# Patient Record
Sex: FEMALE | Race: WHITE | NOT HISPANIC OR LATINO | Employment: OTHER | URBAN - METROPOLITAN AREA
[De-identification: names, ages, dates, MRNs, and addresses within clinical notes are randomized per-mention and may not be internally consistent; named-entity substitution may affect disease eponyms.]

---

## 2017-08-02 ENCOUNTER — OFFICE VISIT (OUTPATIENT)
Dept: INTERNAL MEDICINE | Facility: MEDICAL CENTER | Age: 42
End: 2017-08-02
Payer: MEDICAID

## 2017-08-02 VITALS
BODY MASS INDEX: 26.23 KG/M2 | HEART RATE: 99 BPM | RESPIRATION RATE: 18 BRPM | WEIGHT: 157.4 LBS | SYSTOLIC BLOOD PRESSURE: 124 MMHG | DIASTOLIC BLOOD PRESSURE: 64 MMHG | TEMPERATURE: 97 F | OXYGEN SATURATION: 96 % | HEIGHT: 65 IN

## 2017-08-02 DIAGNOSIS — G89.29 CHRONIC GENERALIZED ABDOMINAL PAIN: ICD-10-CM

## 2017-08-02 DIAGNOSIS — N64.52 BREAST DISCHARGE: ICD-10-CM

## 2017-08-02 DIAGNOSIS — R10.84 CHRONIC GENERALIZED ABDOMINAL PAIN: ICD-10-CM

## 2017-08-02 DIAGNOSIS — F43.10 PTSD (POST-TRAUMATIC STRESS DISORDER): ICD-10-CM

## 2017-08-02 PROCEDURE — 99204 OFFICE O/P NEW MOD 45 MIN: CPT | Mod: GC | Performed by: INTERNAL MEDICINE

## 2017-08-02 RX ORDER — NORGESTIMATE AND ETHINYL ESTRADIOL 7DAYSX3 28
1 KIT ORAL DAILY
COMMUNITY
End: 2017-11-13

## 2017-08-02 RX ORDER — CLONAZEPAM 0.5 MG/1
0.5 TABLET ORAL 3 TIMES DAILY PRN
COMMUNITY
End: 2022-06-03

## 2017-08-02 RX ORDER — MIRTAZAPINE 15 MG/1
3.75-7.5 TABLET, FILM COATED ORAL NIGHTLY
COMMUNITY
End: 2021-07-14

## 2017-08-02 RX ORDER — NITROFURANTOIN MACROCRYSTALS 50 MG/1
50 CAPSULE ORAL 4 TIMES DAILY
COMMUNITY
End: 2017-11-13

## 2017-08-02 RX ORDER — SERTRALINE HYDROCHLORIDE 20 MG/ML
3.5 SOLUTION ORAL DAILY
COMMUNITY
End: 2019-02-11

## 2017-08-02 RX ORDER — OXYCODONE HCL 20 MG/ML
10 CONCENTRATE, ORAL ORAL EVERY 4 HOURS PRN
COMMUNITY
End: 2020-02-03

## 2017-08-02 ASSESSMENT — PAIN SCALES - GENERAL: PAINLEVEL: 8=MODERATE-SEVERE PAIN

## 2017-08-02 NOTE — MR AVS SNAPSHOT
"        ELISSA Berg   2017 2:30 PM   Office Visit   MRN: [id number]    Department:  [facility] - Internal Med   Dept Phone:  [phone]    Description:  Female : 1975   Provider:  Meseret Ballard M.D.           Reason for Visit     Chronic Opiate Therapy referral pain management    Depression     Pelvic Pain           Allergies as of 2017     Allergen Noted Reactions    Sammy-1 [Lidocaine Hcl] 2014   Anaphylaxis    Topical or injectable, \"subq numbing anesthetics\"  \"Causes Methemoglobinemia.\"    Pcn [Penicillins] 2014   Rash      You were diagnosed with     Chronic generalized abdominal pain   [123961]       SBO (small bowel obstruction) (CMS-HCC)   [626351]       Breast discharge   [042731]       PTSD (post-traumatic stress disorder)   [661198]         Vital Signs     Blood Pressure Pulse Temperature Respirations Height Weight    124/64 mmHg 99 36.1 °C (97 °F) 18 1.638 m (5' 4.5\") 71.396 kg (157 lb 6.4 oz)    Body Mass Index Oxygen Saturation Breastfeeding? Smoking Status          26.61 kg/m2 96% No Never Smoker         Basic Information     Date Of Birth Sex Race Ethnicity Preferred Language    1975 Female White Non- English      Your appointments     Oct 10, 2017 10:45 AM   Established Patient with Meseret Ballard M.D.   [facility] / [facility] Med - Internal Medicine (--)    [address] [place] [address] [id number] [phone]           You will be receiving a confirmation call a few days before your appointment from our automated call confirmation system.              Problem List              ICD-10-CM Priority Class Noted - Resolved    Unspecified complication of colostomy or enterostomy (CMS-HCC) PFQ9956   2013 - Present    SBO (small bowel obstruction) (CMS-HCC) K56.69 High  3/18/2013 - Present    Abdominal pain, bilateral lower quadrant  High  3/18/2013 - Present    Neurogenic bladder-straight cath  N31.9 Low  3/26/2014 - Present  "    PTSD (post-traumatic stress disorder) F43.10 Low  3/26/2014 - Present    Chronic narcotic dependence (CMS-HCC) F11.20 Medium  3/26/2014 - Present    Leukocytosis D72.829   3/26/2014 - Present    Hypokalemia E87.6 Low  3/26/2014 - Present    Hypophosphatemia E83.39 Low  3/26/2014 - Present    Elevated liver enzymes R74.8   3/26/2014 - Present      Health Maintenance        Date Due Completion Dates    IMM DTaP/Tdap/Td Vaccine (1 - Tdap) 8/18/1993 ---    PAP SMEAR 8/18/1995 ---    MAMMOGRAM 8/18/2014 ---    IMM INFLUENZA (1) 9/1/2017 ---            Current Immunizations     No immunizations on file.      Below and/or attached are the medications your provider expects you to take. Review all of your home medications and newly ordered medications with your provider and/or pharmacist. Follow medication instructions as directed by your provider and/or pharmacist. Please keep your medication list with you and share with your provider. Update the information when medications are discontinued, doses are changed, or new medications (including over-the-counter products) are added; and carry medication information at all times in the event of emergency situations     Allergies:  CATHY-1 - Anaphylaxis     PCN - Rash               Medications  Valid as of: August 02, 2017 -  3:21 PM    Generic Name Brand Name Tablet Size Instructions for use    ClonazePAM (Tab) KLONOPIN 0.5 MG Take 0.25 mg by mouth 2 times a day.        Cyanocobalamin (Solution) VITAMIN B-12 1000 MCG/ML 1,000 mcg by Intramuscular route every 14 days.        Mirtazapine (Tab) REMERON 15 MG Take 15 mg by mouth every evening.        Nitrofurantoin Macrocrystal (Cap) MACRODANTIN 50 MG Take 50 mg by mouth 4 times a day.        Norgestim-Eth Estrad Triphasic (Tab) Norgestim-Eth Estrad Triphasic 0.18/0.215/0.25 MG-35 MCG Take 1 Tab by mouth every day.        OxyCODONE HCl (Conc) oxycodone 20 MG/ML Take 10 mg by mouth every four hours as needed.        Sertraline HCl  (Conc) ZOLOFT 20 MG/ML Take 25 mg by mouth every day.        TraMADol HCl (Tab) ULTRAM 50 MG Take  mg by mouth every four hours as needed.        .                 Medicines prescribed today were sent to:     None      Medication refill instructions:       If your prescription bottle indicates you have medication refills left, it is not necessary to call your provider’s office. Please contact your pharmacy and they will refill your medication.    If your prescription bottle indicates you do not have any refills left, you may request refills at any time through one of the following ways: The online [employer] system (except Urgent Care), by calling your provider’s office, or by asking your pharmacy to contact your provider’s office with a refill request. Medication refills are processed only during regular business hours and may not be available until the next business day. Your provider may request additional information or to have a follow-up visit with you prior to refilling your medication.   *Please Note: Medication refills are assigned a new Rx number when refilled electronically. Your pharmacy may indicate that no refills were authorized even though a new prescription for the same medication is available at the pharmacy. Please request the medicine by name with the pharmacy before contacting your provider for a refill.        Your To Do List     Future Labs/Procedures Complete By Expires    CBC WITH DIFFERENTIAL  As directed 8/2/2018    COMP METABOLIC PANEL  As directed 8/2/2018      Referral     A referral request has been sent to our patient care coordination department. Please allow 3-5 business days for us to process this request and contact you either by phone or mail. If you do not hear from us by the 5th business day, please call us at [phone].        Instructions    Please make the patient sign the medical release form, thank you!          [employer] Access Code: Activation code not  generated  Current MyChart Status: Active
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
## 2017-08-10 ENCOUNTER — HOSPITAL ENCOUNTER (OUTPATIENT)
Dept: RADIOLOGY | Facility: MEDICAL CENTER | Age: 42
End: 2017-08-10

## 2017-11-13 ENCOUNTER — OFFICE VISIT (OUTPATIENT)
Dept: INTERNAL MEDICINE | Facility: MEDICAL CENTER | Age: 42
End: 2017-11-13
Payer: MEDICAID

## 2017-11-13 VITALS
OXYGEN SATURATION: 95 % | HEART RATE: 75 BPM | TEMPERATURE: 97.8 F | DIASTOLIC BLOOD PRESSURE: 71 MMHG | WEIGHT: 154.8 LBS | HEIGHT: 65 IN | SYSTOLIC BLOOD PRESSURE: 122 MMHG | BODY MASS INDEX: 25.79 KG/M2

## 2017-11-13 DIAGNOSIS — N31.9 NEUROGENIC BLADDER: ICD-10-CM

## 2017-11-13 DIAGNOSIS — F43.10 PTSD (POST-TRAUMATIC STRESS DISORDER): ICD-10-CM

## 2017-11-13 PROCEDURE — 99214 OFFICE O/P EST MOD 30 MIN: CPT | Mod: GC | Performed by: INTERNAL MEDICINE

## 2017-11-13 RX ORDER — NITROFURANTOIN MACROCRYSTALS 50 MG/1
50 CAPSULE ORAL PRN
Qty: 30 CAP | Refills: 3 | Status: SHIPPED | OUTPATIENT
Start: 2017-11-13 | End: 2019-01-27 | Stop reason: SDUPTHER

## 2017-11-13 ASSESSMENT — PAIN SCALES - GENERAL: PAINLEVEL: 6=MODERATE PAIN

## 2017-11-13 NOTE — PROGRESS NOTES
Established Patient    ELISSA presents today with the following:    CC: Follow up, abdominal pain    HPI: ELISSA Berg is a 42 y.o. Female with past medical history of multiple abdominal surgeries for endometriosis complicated with peritonitis, patient had multiple bowel segments removed, ileal removal, splenectomy, surgical drains, divert colostomy followed by revision and small bowel connected to the rectum,  patient has a chronic abdominal pain from multiple surgeries, following pain management clinic for pain medications, symptoms controlled. she was offered sympathetic block vs. intraabdominal pain med pump.      The patient was stopped taking contraceptive medications by her gynecologist due to increased risk of blood clot, also the patient is not sexually active, educated to use a condom if needed.      The patient was told that she had right adrenal mass found in the US and will have a CT scan with/without contrast this Thursday, denies any weight loss, vomiting, nausea.      Review of Systems:     Constitutional: Denies fevers, weight changes  Eyes: Denies changes in vision, no eye pain  Ears/Nose/Throat/Mouth: Denies nasal congestion or sore throat   Cardiovascular: Denies chest pain or palpitations   Respiratory: Denies shortness of breath , Denies cough  Gastrointestinal/Hepatic: per H and P  Genitourinary: self cath on nitrofurantoin as needed  Musculoskeletal/Rheum: Denies  joint pain and swelling   Skin: Denies rash.  Neurological: Denies headache, confusion, memory loss or focal weakness/parasthesias  Psychiatric: denies mood disorder         Patient Active Problem List    Diagnosis Date Noted   • SBO (small bowel obstruction) 03/18/2013     Priority: High   • Abdominal pain, bilateral lower quadrant 03/18/2013     Priority: High   • Chronic narcotic dependence (CMS-HCC) 03/26/2014     Priority: Medium   • Neurogenic bladder-straight cath  03/26/2014     Priority: Low   • PTSD  "(post-traumatic stress disorder) 03/26/2014     Priority: Low   • Hypokalemia 03/26/2014     Priority: Low   • Hypophosphatemia 03/26/2014     Priority: Low   • Leukocytosis 03/26/2014   • Elevated liver enzymes 03/26/2014   • Unspecified complication of colostomy or enterostomy 01/04/2013       Current Outpatient Prescriptions   Medication Sig Dispense Refill   • nitrofurantoin (MACRODANTIN) 50 MG Cap Take 1 Cap by mouth as needed. For UTI related to self cath 30 Cap 3   • clonazepam (KLONOPIN) 0.5 MG Tab Take 0.25 mg by mouth 2 times a day.     • oxycodone 20 MG/ML Conc Take 10 mg by mouth every four hours as needed.     • sertraline (ZOLOFT) 20 MG/ML concentrated solution Take 25 mg by mouth every day.     • mirtazapine (REMERON) 15 MG Tab Take 15 mg by mouth every evening.     • cyanocobalamin (VITAMIN B-12) 1000 MCG/ML SOLN 1,000 mcg by Intramuscular route every 14 days.     • tramadol (ULTRAM) 50 MG TABS Take  mg by mouth every four hours as needed.       No current facility-administered medications for this visit.        /71   Pulse 75   Temp 36.6 °C (97.8 °F)   Ht 1.638 m (5' 4.5\")   Wt 70.2 kg (154 lb 12.8 oz)   SpO2 95%   BMI 26.16 kg/m²     Physical Exam   Constitutional:  Comfortable. No acute distress.   Eyes: Pupils are equal, round, and reactive to light. No scleral icterus.  Neck: Neck supple. No thyromegaly present.   Cardiovascular: Normal rate, regular rhythm and normal heart sounds.  Exam reveals no gallop and no friction rub.  No murmur heard.  Abdomen: Midline scar, scar from closing colostomy non tender, non distended. Bowel sounds positive.  Pulmonary/Chest: Lungs clear to ascultation bilaterally. Breath sounds normal. No rhonchi, wheezes or crackles.   Musculoskeletal:   No deformity noted. no edema.   Lymphadenopathy: no cervical adenopathy  Neurological: alert and oriented to person, place, and time. Cranial nerves 2-12 grossly intact. No obvious motor or sensory " deficits.  Extremities: No edema. No clubbing. No cyanosis. Distal pulses 2+ bilaterally.  Skin: No Rash. No cyanosis. Nails show no clubbing.      Assessment and Plan    1. Neurogenic bladder-straight cath   - patient do intermittent self-cath, refill   - no UTI symptom at this point   - Nitrofurantoin (MACRODANTIN) 50 MG Cap; Take 1 Cap by mouth as needed. For UTI related to self-cath  Dispense: 30 Cap; Refill: 3    2. Abdominal pain, bilateral lower quadrant  - Better controlled with pain management clinic, no acute issue  No pain medication refill at our clinic  - Questionable Mass in the abdomen on the US, will have CT scan on Thursday and will have chem panel and CBC before the imaging.    3. Preventive care  - Pneumococcal and flu -  Refused   - Pap - she will do it by her gynecologist   - Mammogram - will check in the next visit       Signed by: Meseret Ballard M.D.

## 2018-07-17 ENCOUNTER — HOSPITAL ENCOUNTER (OUTPATIENT)
Dept: LAB | Facility: MEDICAL CENTER | Age: 43
End: 2018-07-17
Attending: INTERNAL MEDICINE
Payer: MEDICAID

## 2018-07-17 ENCOUNTER — OFFICE VISIT (OUTPATIENT)
Dept: INTERNAL MEDICINE | Facility: MEDICAL CENTER | Age: 43
End: 2018-07-17
Payer: MEDICAID

## 2018-07-17 VITALS
HEIGHT: 64 IN | DIASTOLIC BLOOD PRESSURE: 86 MMHG | OXYGEN SATURATION: 95 % | SYSTOLIC BLOOD PRESSURE: 146 MMHG | RESPIRATION RATE: 16 BRPM | TEMPERATURE: 98.6 F | BODY MASS INDEX: 29.37 KG/M2 | WEIGHT: 172 LBS | HEART RATE: 80 BPM

## 2018-07-17 DIAGNOSIS — N64.52 BREAST DISCHARGE: ICD-10-CM

## 2018-07-17 DIAGNOSIS — R10.31 RIGHT LOWER QUADRANT ABDOMINAL PAIN: ICD-10-CM

## 2018-07-17 DIAGNOSIS — D35.02 ADENOMA OF LEFT ADRENAL GLAND: ICD-10-CM

## 2018-07-17 DIAGNOSIS — N83.209 SIMPLE OVARIAN CYST: ICD-10-CM

## 2018-07-17 LAB
BASOPHILS # BLD AUTO: 0.7 % (ref 0–1.8)
BASOPHILS # BLD: 0.04 K/UL (ref 0–0.12)
EOSINOPHIL # BLD AUTO: 0.1 K/UL (ref 0–0.51)
EOSINOPHIL NFR BLD: 1.7 % (ref 0–6.9)
ERYTHROCYTE [DISTWIDTH] IN BLOOD BY AUTOMATED COUNT: 43.1 FL (ref 35.9–50)
HCT VFR BLD AUTO: 41 % (ref 37–47)
HGB BLD-MCNC: 13.4 G/DL (ref 12–16)
IMM GRANULOCYTES # BLD AUTO: 0 K/UL (ref 0–0.11)
IMM GRANULOCYTES NFR BLD AUTO: 0 % (ref 0–0.9)
LYMPHOCYTES # BLD AUTO: 2.03 K/UL (ref 1–4.8)
LYMPHOCYTES NFR BLD: 35.2 % (ref 22–41)
MCH RBC QN AUTO: 29.8 PG (ref 27–33)
MCHC RBC AUTO-ENTMCNC: 32.7 G/DL (ref 33.6–35)
MCV RBC AUTO: 91.1 FL (ref 81.4–97.8)
MONOCYTES # BLD AUTO: 0.35 K/UL (ref 0–0.85)
MONOCYTES NFR BLD AUTO: 6.1 % (ref 0–13.4)
NEUTROPHILS # BLD AUTO: 3.25 K/UL (ref 2–7.15)
NEUTROPHILS NFR BLD: 56.3 % (ref 44–72)
NRBC # BLD AUTO: 0 K/UL
NRBC BLD-RTO: 0 /100 WBC
PLATELET # BLD AUTO: 260 K/UL (ref 164–446)
PMV BLD AUTO: 10.1 FL (ref 9–12.9)
RBC # BLD AUTO: 4.5 M/UL (ref 4.2–5.4)
WBC # BLD AUTO: 5.8 K/UL (ref 4.8–10.8)

## 2018-07-17 PROCEDURE — 99214 OFFICE O/P EST MOD 30 MIN: CPT | Mod: GC | Performed by: INTERNAL MEDICINE

## 2018-07-17 PROCEDURE — 85025 COMPLETE CBC W/AUTO DIFF WBC: CPT

## 2018-07-17 PROCEDURE — 36415 COLL VENOUS BLD VENIPUNCTURE: CPT

## 2018-07-17 RX ORDER — BACITRACIN ZINC AND POLYMYXIN B SULFATE 500; 10000 [USP'U]/G; [USP'U]/G
1 OINTMENT TOPICAL DAILY
COMMUNITY
End: 2021-07-14

## 2018-07-17 RX ORDER — HYDROMORPHONE HYDROCHLORIDE 2 MG/1
2 TABLET ORAL 3 TIMES DAILY
COMMUNITY
End: 2021-11-10 | Stop reason: SDUPTHER

## 2018-07-17 NOTE — PROGRESS NOTES
Established Patient    ELISSA presents today with the following:    CC: abdominal pain and left sided breast pain    HPI: ELISSA Berg is a 42 y.o. Female with chronic pain in the abdomen following multiple abdominal surgeries, reported change in the severity of the pain on the right lower quadrant, not associated with nausea or vomiting, no change in bowel habits, reported also left side flank pain with CVA tenderness on exam, already do self-cath to drain urine for urine retention, not sure if she had a fever on not since everytime she cath herself she develop mild low-grade fever, no temp documented  already have a simple ovarian cyst on CT scan 2016, denies a change in menstrual cycle or bowel habits    the patient also complain of pain in the left breast started about 4 weeks ago and then followed by discharge clear and cloudy secretion about two weeks ago, for a small amount on one side with old unilateral nipple retraction was present on the previous exam, patient had in the past MRI breast was not remarkable, will order this time US and Mammogram and follow with CBC, she denies any rash, redness of the area, the menstrual cycle is regular, denies any pregnancy or nursing.    Review of Systems:     Constitutional: Denies weight changes  Eyes: Denies changes in vision, no eye pain  Ears/Nose/Throat/Mouth: Denies nasal congestion or sore throat   Cardiovascular: Denies chest pain or palpitations   Respiratory: Denies shortness of breath Denies cough  Gastrointestinal/Hepatic: Denies nausea, vomiting, diarrhea or constipation.  Genitourinary: chronic bladder dysfunction with overflow incontinence    Musculoskeletal/Rheum: Denies joint pain and swelling   Skin: Denies rash.  Neurological: Denies headache, confusion, memory loss or focal weakness/parasthesias  Psychiatric: denies mood disorder         Patient Active Problem List    Diagnosis Date Noted   • SBO (small bowel obstruction) (HCC)  "03/18/2013     Priority: High   • Abdominal pain, bilateral lower quadrant 03/18/2013     Priority: High   • Chronic narcotic dependence (HCC) 03/26/2014     Priority: Medium   • Neurogenic bladder-straight cath  03/26/2014     Priority: Low   • PTSD (post-traumatic stress disorder) 03/26/2014     Priority: Low   • Hypokalemia 03/26/2014     Priority: Low   • Hypophosphatemia 03/26/2014     Priority: Low   • Leukocytosis 03/26/2014   • Elevated liver enzymes 03/26/2014   • Unspecified complication of colostomy or enterostomy 01/04/2013       Current Outpatient Prescriptions   Medication Sig Dispense Refill   • Bacillus Coagulans-Inulin (PROBIOTIC FORMULA) 1-250 BILLION-MG Cap Take  by mouth.     • HYDROmorphone (DILAUDID) 2 MG Tab Take 2-4 mg by mouth every 3 hours as needed for Severe Pain.     • nitrofurantoin (MACRODANTIN) 50 MG Cap Take 1 Cap by mouth as needed. For UTI related to self cath 30 Cap 3   • clonazepam (KLONOPIN) 0.5 MG Tab Take 0.25 mg by mouth 2 times a day.     • oxycodone 20 MG/ML Conc Take 10 mg by mouth every four hours as needed.     • sertraline (ZOLOFT) 20 MG/ML concentrated solution Take 25 mg by mouth every day.     • mirtazapine (REMERON) 15 MG Tab Take 15 mg by mouth every evening.     • cyanocobalamin (VITAMIN B-12) 1000 MCG/ML SOLN 1,000 mcg by Intramuscular route every 14 days.     • tramadol (ULTRAM) 50 MG TABS Take  mg by mouth every four hours as needed.       No current facility-administered medications for this visit.        /86   Pulse 80   Temp 37 °C (98.6 °F)   Resp 16   Ht 1.638 m (5' 4.49\")   Wt 78 kg (172 lb)   SpO2 95%   Breastfeeding? No   BMI 29.08 kg/m²     Physical Exam   Constitutional:  Comfortable. No acute distress.   Eyes: Pupils are equal, round, and reactive to light. No scleral icterus.  Neck: Neck supple. No thyromegaly present.   Cardiovascular: Normal rate, regular rhythm and normal heart sounds.  Exam reveals no gallop and no friction " rub.  No murmur heard.  Pulmonary/Chest: Lungs clear to ascultation bilaterally. Breath sounds normal. No rhonchi, wheezes or crackles.   Musculoskeletal:   No deformity noted. no edema.   Lymphadenopathy: no cervical adenopathy  Neurological: alert and oriented to person, place, and time. Cranial nerves 2-12 grossly intact. No obvious motor or sensory deficits.  Extremities: No edema. No clubbing. No cyanosis. Distal pulses 2+ bilaterally.  Skin: No Rash. No cyanosis. Nails show no clubbing.  Abdominal exam showed no feature of acute abdomen, mild diffuse tenderness on exam mainly in the right lower quadrant, no superficial skin infection, no rebound tenderness or rigidity  Breast exam was done my the attending Dr Goodwin, inverted nipple with no discharge due to increase tenderness, no redness of skin, no LAP was found or mass        Assessment and Plan  1. Right lower quadrant abdominal pain  - chronic pain in the abdomen following multiple abdominal surgery, reported change in the severity of the pain on the right lower quadrant, not associated with nausea or vomiting, no change in bowel habits, reported also left side flank pain, already do self cath for urine drain due to bladder urine retention, not sure if there is a fever on not since every time she cath herself she develop mild low grade fever, no temp documented  - exam showed increase tenderness on the right lower quadrant, mainly with superficial and deep palpation, without rigidity or rebound tenderness,  - already have a simple ovarian cyst on CT scan 2016  - no change in menstrual cycle, no change in bowel habits     Plan  - pain treated with pain management clinic   - ordered US for now and will consider CT scan of abdomen if the test is negative or positive since the pain is increasing per patient report  - CBC WITH DIFFERENTIAL; Future  - COMP METABOLIC PANEL; Future  - US-ABDOMEN COMPLETE SURVEY; Future  - pain may related to scar, less likely  infection or torsion, no feature of acute abdomen. Will call the patient after the CBC result    2. Breast discharge and pain on the left side  - patient reported discharge with clear secretion followed by cloudy secretion form one side, denies any previous breast discharge, previous pregnancy or trauma, patient is not sexually active  - she denies any blood in discharge   -  Not sure what is the etiology of the pain, may related to fibrocystic changes in the breast, normal cycle and breast is tender to exam, no LAP, unable to express discharge for lab test   - MA-DIAGNOSTIC MAMMO W/CAD-BILAT; Future  - US-BREAST BILAT-COMPLETE; Future  - will wait for the result of mammogram and US and follow up in five-week for a pap smear and for eval for abdominal and breast complain    3. Adenoma of left adrenal gland March 2016 media scan  4. Simple ovarian cyst March 2016 media scan      Signed by: Meseret Ballard M.D.

## 2018-07-20 ENCOUNTER — HOSPITAL ENCOUNTER (OUTPATIENT)
Dept: LAB | Facility: MEDICAL CENTER | Age: 43
End: 2018-07-20
Attending: INTERNAL MEDICINE
Payer: MEDICAID

## 2018-07-20 LAB
ALBUMIN SERPL BCP-MCNC: 4.5 G/DL (ref 3.2–4.9)
ALBUMIN/GLOB SERPL: 1.3 G/DL
ALP SERPL-CCNC: 58 U/L (ref 30–99)
ALT SERPL-CCNC: 20 U/L (ref 2–50)
ANION GAP SERPL CALC-SCNC: 8 MMOL/L (ref 0–11.9)
AST SERPL-CCNC: 35 U/L (ref 12–45)
BILIRUB SERPL-MCNC: 0.6 MG/DL (ref 0.1–1.5)
BUN SERPL-MCNC: 9 MG/DL (ref 8–22)
CALCIUM SERPL-MCNC: 9.5 MG/DL (ref 8.5–10.5)
CHLORIDE SERPL-SCNC: 106 MMOL/L (ref 96–112)
CO2 SERPL-SCNC: 25 MMOL/L (ref 20–33)
CREAT SERPL-MCNC: 0.8 MG/DL (ref 0.5–1.4)
GLOBULIN SER CALC-MCNC: 3.4 G/DL (ref 1.9–3.5)
GLUCOSE SERPL-MCNC: 97 MG/DL (ref 65–99)
POTASSIUM SERPL-SCNC: 3.7 MMOL/L (ref 3.6–5.5)
PROT SERPL-MCNC: 7.9 G/DL (ref 6–8.2)
SODIUM SERPL-SCNC: 139 MMOL/L (ref 135–145)

## 2018-07-20 PROCEDURE — 80053 COMPREHEN METABOLIC PANEL: CPT

## 2018-07-20 PROCEDURE — 36415 COLL VENOUS BLD VENIPUNCTURE: CPT

## 2018-08-21 ENCOUNTER — OFFICE VISIT (OUTPATIENT)
Dept: INTERNAL MEDICINE | Facility: MEDICAL CENTER | Age: 43
End: 2018-08-21
Payer: MEDICAID

## 2018-08-21 VITALS
OXYGEN SATURATION: 96 % | HEART RATE: 78 BPM | SYSTOLIC BLOOD PRESSURE: 131 MMHG | HEIGHT: 64 IN | DIASTOLIC BLOOD PRESSURE: 85 MMHG | BODY MASS INDEX: 29.37 KG/M2 | TEMPERATURE: 97.1 F | WEIGHT: 172 LBS

## 2018-08-21 DIAGNOSIS — Z00.00 ANNUAL PHYSICAL EXAM: ICD-10-CM

## 2018-08-21 DIAGNOSIS — Z00.00 PREVENTATIVE HEALTH CARE: ICD-10-CM

## 2018-08-21 PROCEDURE — G0438 PPPS, INITIAL VISIT: HCPCS | Mod: 25,GC | Performed by: INTERNAL MEDICINE

## 2018-08-21 RX ORDER — OXYCODONE HCL 5 MG/5 ML
5 SOLUTION, ORAL ORAL
COMMUNITY
Start: 2018-07-25 | End: 2020-02-03

## 2018-08-21 RX ORDER — NALOXONE HYDROCHLORIDE 4 MG/.1ML
1 SPRAY NASAL PRN
COMMUNITY

## 2018-08-21 NOTE — PROGRESS NOTES
Established Patient    Corazon presents today with the following:    CC: PAP smear, review lab and imaging results    HPI: Corazon Berg is a 43 y.o. female with past medical history of multiple abdominal surgeries presented today to review lab and imaging results, the patient is doing the same still have episodes of abdominal pain likely related to the previous scar from multiple surgeries, denies any fever or chills, normal abdominal US results with a possible hemangioma on the liver.    The patient will follow tomorrow with the GI for colonoscopy, currently doing bowel preparation for tomorrow.     patient visit today to do PAP smear, denies any vaginal discharge, bleeding, foul smell. she reported not being sexually active.     Review of Systems:     Constitutional: Denies fevers, Denies weight changes  Eyes: Denies changes in vision, no eye pain  Ears/Nose/Throat/Mouth: Denies nasal congestion or sore throat   Cardiovascular: Denies chest pain or palpitations   Respiratory: Denies shortness of breath , Denies cough  Gastrointestinal/Hepatic: Denies abdominal pain, nausea, vomiting, diarrhea or constipation.  Genitourinary: Denies bladder dysfunction, dysuria or frequency  Musculoskeletal/Rheum: Denies  joint pain and swelling   Skin: Denies rash.  Neurological: Denies headache, confusion, memory loss or focal weakness/parasthesias  Psychiatric: denies mood disorder         Patient Active Problem List    Diagnosis Date Noted   • SBO (small bowel obstruction) ([facility]) 03/18/2013     Priority: High   • Abdominal pain, bilateral lower quadrant 03/18/2013     Priority: High   • Chronic narcotic dependence (HCC) 03/26/2014     Priority: Medium   • Neurogenic bladder-straight cath  03/26/2014     Priority: Low   • PTSD (post-traumatic stress disorder) 03/26/2014     Priority: Low   • Hypokalemia 03/26/2014     Priority: Low   • Hypophosphatemia 03/26/2014     Priority: Low   • Adenoma of left adrenal gland  "07/17/2018   • Simple ovarian cyst 07/17/2018   • Leukocytosis 03/26/2014   • Elevated liver enzymes 03/26/2014   • Unspecified complication of colostomy or enterostomy 01/04/2013       Current Outpatient Prescriptions   Medication Sig Dispense Refill   • Bacillus Coagulans-Inulin (PROBIOTIC FORMULA) 1-250 BILLION-MG Cap Take  by mouth.     • HYDROmorphone (DILAUDID) 2 MG Tab Take 2-4 mg by mouth every 3 hours as needed for Severe Pain.     • nitrofurantoin (MACRODANTIN) 50 MG Cap Take 1 Cap by mouth as needed. For UTI related to self cath 30 Cap 3   • clonazepam (KLONOPIN) 0.5 MG Tab Take 0.25 mg by mouth 2 times a day.     • oxycodone 20 MG/ML Conc Take 10 mg by mouth every four hours as needed.     • sertraline (ZOLOFT) 20 MG/ML concentrated solution Take 25 mg by mouth every day.     • mirtazapine (REMERON) 15 MG Tab Take 7.5 mg by mouth every evening.     • cyanocobalamin (VITAMIN B-12) 1000 MCG/ML SOLN 1,000 mcg by Intramuscular route every 14 days.     • tramadol (ULTRAM) 50 MG TABS Take  mg by mouth every four hours as needed.       No current facility-administered medications for this visit.        /85   Pulse 78   Temp 36.2 °C (97.1 °F)   Ht 1.638 m (5' 4.49\")   Wt 78 kg (172 lb)   LMP 08/02/2018   SpO2 96%   Breastfeeding? No   BMI 29.08 kg/m²     Physical Exam   Constitutional:  Comfortable. No acute distress.   Eyes: Pupils are equal, round, and reactive to light. No scleral icterus.  Neck: Neck supple. No thyromegaly present.   Cardiovascular: Normal rate, regular rhythm and normal heart sounds.  Exam reveals no gallop and no friction rub.  No murmur heard.  Pulmonary/Chest: Lungs clear to ascultation bilaterally. Breath sounds normal. No rhonchi, wheezes or crackles.   Musculoskeletal:   No deformity noted. no edema.   Lymphadenopathy: no cervical adenopathy  Neurological: alert and oriented to person, place, and time. Cranial nerves 2-12 grossly intact. No obvious motor or sensory " deficits.  Extremities: No edema. No clubbing. No cyanosis. Distal pulses 2+ bilaterally.  Skin: No Rash. No cyanosis. Nails show no clubbing.    Assessment and Plan  1. Annual physical exam  2. Preventative healthcare  - the patient is not sexually active  - denies any vaginal bleeding or foul discharge.   - evaluate for cervical cancer screening   PAP smear done during the office visit, normal vaginal exam, normal mucosa, no pus discharge, no bleeding, pap sent for thin smear with HPV testing. Will inform the patient regarding the lab test results.      Signed by: Meseret Ballard M.D.

## 2018-08-24 DIAGNOSIS — Z00.00 PREVENTATIVE HEALTH CARE: ICD-10-CM

## 2018-08-24 DIAGNOSIS — Z00.00 ANNUAL PHYSICAL EXAM: ICD-10-CM

## 2018-09-06 ENCOUNTER — TELEPHONE (OUTPATIENT)
Dept: INTERNAL MEDICINE | Facility: MEDICAL CENTER | Age: 43
End: 2018-09-06

## 2018-09-06 NOTE — TELEPHONE ENCOUNTER
1. Caller Name: Corazon Berg                      Call Back Number: [phone] (home)       2. Message: Patient left voice message requesting pap smear results.    3. Patient approves office to leave a detailed voicemail/MyChart message: yes

## 2018-09-13 NOTE — TELEPHONE ENCOUNTER
Discussed results with patient about being positive for E6/E7 for high risk types of HPV. Patient was a bit anxious about results and wondered what to do next. I mentioned to her that the next step would be to come in to discuss further the results with her PCP. I also mentioned that serial check ups would be one of the best tools in her case. Patient seemed to be less anxious and was appreciative of call. She mentioned making an appointment soon to discuss further care with Dr. Ballard.

## 2018-09-14 NOTE — TELEPHONE ENCOUNTER
In addition,  I discussed no malignancy noted and a shift in jacobo suggestive of BV. Patient asymptomatic, no ABX given.

## 2018-09-26 ENCOUNTER — OFFICE VISIT (OUTPATIENT)
Dept: INTERNAL MEDICINE | Facility: MEDICAL CENTER | Age: 43
End: 2018-09-26
Payer: MEDICAID

## 2018-09-26 VITALS
HEART RATE: 80 BPM | SYSTOLIC BLOOD PRESSURE: 143 MMHG | TEMPERATURE: 97.4 F | OXYGEN SATURATION: 96 % | BODY MASS INDEX: 30.35 KG/M2 | DIASTOLIC BLOOD PRESSURE: 92 MMHG | WEIGHT: 177.8 LBS | HEIGHT: 64 IN

## 2018-09-26 DIAGNOSIS — B96.89 BACTERIAL VAGINOSIS: ICD-10-CM

## 2018-09-26 DIAGNOSIS — N76.0 BACTERIAL VAGINOSIS: ICD-10-CM

## 2018-09-26 DIAGNOSIS — B97.7 HPV (HUMAN PAPILLOMA VIRUS) INFECTION: ICD-10-CM

## 2018-09-26 PROCEDURE — 99214 OFFICE O/P EST MOD 30 MIN: CPT | Mod: GC | Performed by: INTERNAL MEDICINE

## 2018-09-26 RX ORDER — METRONIDAZOLE 7.5 MG/G
1 GEL VAGINAL
Qty: 1 TUBE | Refills: 0 | Status: SHIPPED | OUTPATIENT
Start: 2018-09-26 | End: 2019-02-11

## 2018-09-26 RX ORDER — METRONIDAZOLE 7.5 MG/G
1 GEL VAGINAL
Qty: 1 TUBE | Refills: 0 | Status: SHIPPED | OUTPATIENT
Start: 2018-09-26 | End: 2018-09-26 | Stop reason: CLARIF

## 2018-09-26 ASSESSMENT — ENCOUNTER SYMPTOMS
MYALGIAS: 0
ORTHOPNEA: 0
CHILLS: 0
BRUISES/BLEEDS EASILY: 0
FLANK PAIN: 0
BLURRED VISION: 0
FEVER: 0
DIZZINESS: 0
COUGH: 0
NAUSEA: 0
SHORTNESS OF BREATH: 0
DEPRESSION: 0
HEADACHES: 0
NERVOUS/ANXIOUS: 0
DOUBLE VISION: 0
HEARTBURN: 0

## 2018-09-26 NOTE — PATIENT INSTRUCTIONS
Bacterial Vaginosis  Bacterial vaginosis is an infection of the vagina. It happens when too many germs (bacteria) grow in the vagina. This infection puts you at risk for infections from sex (STIs). Treating this infection can lower your risk for some STIs. You should also treat this if you are pregnant. It can cause your baby to be born early.  Follow these instructions at home:  Medicines  · Take over-the-counter and prescription medicines only as told by your doctor.  · Take or use your antibiotic medicine as told by your doctor. Do not stop taking or using it even if you start to feel better.  General instructions  · If you your sexual partner is a woman, tell her that you have this infection. She needs to get treatment if she has symptoms. If you have a male partner, he does not need to be treated.  · During treatment:  ¨ Avoid sex.  ¨ Do not douche.  ¨ Avoid alcohol as told.  ¨ Avoid breastfeeding as told.  · Drink enough fluid to keep your pee (urine) clear or pale yellow.  · Keep your vagina and butt (rectum) clean.  ¨ Wash the area with warm water every day.  ¨ Wipe from front to back after you use the toilet.  · Keep all follow-up visits as told by your doctor. This is important.  Preventing this condition  · Do not douche.  · Use only warm water to wash around your vagina.  · Use protection when you have sex. This includes:  ¨ Latex condoms.  ¨ Dental dams.  · Limit how many people you have sex with. It is best to only have sex with the same person (be monogamous).  · Get tested for STIs. Have your partner get tested.  · Wear underwear that is cotton or lined with cotton.  · Avoid tight pants and pantyhose. This is most important in summer.  · Do not use any products that have nicotine or tobacco in them. These include cigarettes and e-cigarettes. If you need help quitting, ask your doctor.  · Do not use illegal drugs.  · Limit how much alcohol you drink.  Contact a doctor if:  · Your symptoms do not get  better, even after you are treated.  · You have more discharge or pain when you pee (urinate).  · You have a fever.  · You have pain in your belly (abdomen).  · You have pain with sex.  · Your bleed from your vagina between periods.  Summary  · This infection happens when too many germs (bacteria) grow in the vagina.  · Treating this condition can lower your risk for some infections from sex (STIs).  · You should also treat this if you are pregnant. It can cause early (premature) birth.  · Do not stop taking or using your antibiotic medicine even if you start to feel better.  This information is not intended to replace advice given to you by your health care provider. Make sure you discuss any questions you have with your health care provider.  Document Released: 09/26/2009 Document Revised: 09/02/2017 Document Reviewed: 09/02/2017  [employer] Interactive Patient Education © 2017 [employer]

## 2018-09-27 NOTE — PROGRESS NOTES
Established Care    CC:   Chief Complaint   Patient presents with   • Follow-Up     pap smear result        HPI: Patient is a 43 y.o. female here to follow-up on Pap smear results. She is a regular patient of Dr. Ballard. She had a screening pap smear done on 8/21/18 which showed bacterial vaginosis, no intraepithelial malignancy, but HPV testing was (+) for E6/E7. She was notified by Dr. Montanez that she would need follow-up with possible referral to a gynecologist. Not currently sexually active. She is asking for the implications of the pap results. Denies vaginal discharge, itching, fever/chills, genital lesions.     PMH listed below.     ROS:  Review of Systems   Constitutional: Negative for chills and fever.   HENT: Negative for hearing loss and tinnitus.    Eyes: Negative for blurred vision and double vision.   Respiratory: Negative for cough and shortness of breath.    Cardiovascular: Negative for chest pain and orthopnea.   Gastrointestinal: Negative for heartburn and nausea.   Genitourinary: Negative for flank pain and hematuria.   Musculoskeletal: Negative for joint pain and myalgias.   Skin: Negative for itching and rash.   Neurological: Negative for dizziness and headaches.   Endo/Heme/Allergies: Negative for environmental allergies. Does not bruise/bleed easily.   Psychiatric/Behavioral: Negative for depression. The patient is not nervous/anxious.      Patient Active Problem List    Diagnosis Date Noted   • SBO (small bowel obstruction) (HCC) 03/18/2013     Priority: High   • Abdominal pain, bilateral lower quadrant 03/18/2013     Priority: High   • Chronic narcotic dependence (HCC) 03/26/2014     Priority: Medium   • Neurogenic bladder-straight cath  03/26/2014     Priority: Low   • PTSD (post-traumatic stress disorder) 03/26/2014     Priority: Low   • Hypokalemia 03/26/2014     Priority: Low   • Hypophosphatemia 03/26/2014     Priority: Low   • HPV (human papilloma virus) infection  09/26/2018   • Bacterial vaginosis 09/26/2018   • Adenoma of left adrenal gland 07/17/2018   • Simple ovarian cyst 07/17/2018   • Leukocytosis 03/26/2014   • Elevated liver enzymes 03/26/2014   • Unspecified complication of colostomy or enterostomy 01/04/2013     Past Medical History:   Diagnosis Date   • Bowel perforation (formerly [facility])    • Heart murmur     grade 2, and grade 4   • Kidney infection    • Obstruction     reversed 3/2013   • Other specified disorder of intestines     chronic diarrhea   • Other specified symptom associated with female genital organs    • Pain 3/14/13    6/10 abdomen   • Pneumonia 1992   • Pneumothorax    • PTSD (post-traumatic stress disorder)    • SBO (small bowel obstruction) (formerly [facility])    • Sepsis(995.91) 1997&1998   • Unspecified urinary incontinence    • Urethra disorder     self caths   • Urinary bladder disorder     self caths, freq uti, and kidney infections     Current Outpatient Prescriptions   Medication Sig Dispense Refill   • metroNIDAZOLE (METROGEL-VAGINAL) 0.75 % Gel Insert 1 Applicator in vagina every bedtime. 1 Tube 0   • oxyCODONE (ROXICODONE) 5 MG/5ML solution Take 5 mg by mouth.     • Bacillus Coagulans-Inulin (PROBIOTIC FORMULA) 1-250 BILLION-MG Cap Take  by mouth.     • HYDROmorphone (DILAUDID) 2 MG Tab Take 2-4 mg by mouth every 3 hours as needed for Severe Pain.     • clonazepam (KLONOPIN) 0.5 MG Tab Take 0.5 mg by mouth 2 times a day.     • sertraline (ZOLOFT) 20 MG/ML concentrated solution Take 3.5 mg by mouth every day.     • mirtazapine (REMERON) 15 MG Tab Take 7.5 mg by mouth every evening.     • cyanocobalamin (VITAMIN B-12) 1000 MCG/ML SOLN 1,000 mcg by Intramuscular route every 14 days.     • tramadol (ULTRAM) 50 MG TABS Take  mg by mouth every four hours as needed.     • NARCAN 4 MG/0.1ML Liquid 4 mg.     • nitrofurantoin (MACRODANTIN) 50 MG Cap Take 1 Cap by mouth as needed. For UTI related to self cath 30 Cap 3   • oxycodone 20 MG/ML Conc Take 10 mg by mouth  "every four hours as needed.       No current facility-administered medications for this visit.      Allergies as of 09/26/2018 - Reviewed 09/26/2018   Allergen Reaction Noted   • Sammy-1 [lidocaine hcl] Anaphylaxis 03/25/2014   • Pcn [penicillins] Rash 03/25/2014     Social History     Social History   • Marital status: Single     Spouse name: N/A   • Number of children: N/A   • Years of education: N/A     Occupational History   • Not on file.     Social History Main Topics   • Smoking status: Never Smoker   • Smokeless tobacco: Never Used   • Alcohol use No   • Drug use: No   • Sexual activity: Not on file     Other Topics Concern   • Not on file     Social History Narrative   • No narrative on file     History reviewed. No pertinent family history.    Past Surgical History:   Procedure Laterality Date   • BARTHOLIN GLAND EXCISION  3/7/2014    Performed by Ana Boyle M.D. at SURGERY SAME DAY [facility]   • EXPLORATORY LAPAROTOMY  3/18/2013    Performed by Shawn Gutierrez M.D. at SURGERY [facility]   • LYSIS ADHESIONS GENERAL  3/18/2013    Performed by Shawn Gutierrez M.D. at SURGERY [facility]   • COLON RESECTION  3/18/2013    Performed by Shawn Gutierrez M.D. at SURGERY [facility]   • COLOSTOMY TAKEDOWN  3/18/2013    Performed by Shawn Gutierrez M.D. at SURGERY [facility]   • RECOVERY  1/4/2013    Performed by [facility] at SURGERY SAME DAY [facility]   • COLOSTOMY  6/11/12   • OTHER ABDOMINAL SURGERY  6/11/2012    COLOSTOMY AND PELVIC DRAIN   • OTHER  5/25/12    lapratomy with lysis of adhesions   • APPENDECTOMY     • CATH PLACE [facility]     • OTHER ABDOMINAL SURGERY      [id number] RESECTION   • OTHER ABDOMINAL SURGERY      PARTIAL SPLENECTOMY     Physical Exam:  /92 (BP Location: Right arm, Patient Position: Sitting, BP Cuff Size: Adult)   Pulse 80   Temp 36.3 °C (97.4 °F) (Temporal)   Ht 1.626 m (5' 4\")   Wt 80.6 kg " (177 lb 12.8 oz)   SpO2 96%   BMI 30.52 kg/m²   Physical Exam   Constitutional: She is oriented to person, place, and time. She appears well-developed and well-nourished.   HENT:   Head: Normocephalic and atraumatic.   Eyes: Conjunctivae and EOM are normal.   Neck: Normal range of motion.   Cardiovascular: Normal rate, regular rhythm, normal heart sounds and intact distal pulses.    Abdominal: Soft.   (+) well healed scar at midline   Musculoskeletal: Normal range of motion. She exhibits no deformity.   Lymphadenopathy:     She has no cervical adenopathy.   Neurological: She is alert and oriented to person, place, and time.   Skin: Skin is warm and dry. Capillary refill takes less than 2 seconds.   Psychiatric: She has a normal mood and affect. Her behavior is normal.   Vitals reviewed.    Note: I have reviewed all pertinent labs and diagnostic tests associated with this visit with specific comments listed under the assessment and plan below    Assessment and Plan:    1. HPV (human papilloma virus) infection  - Patient is negative for cytologic abnormalities, but new (+) result for oncogenic mRNA (E6/E7)  - Patient is not currently sexually active   - Extensively counseled this patient on HPV and the need for more frequent surveillance via Pap smears, transmission risk  - Refer to gynecology for colposcopy   - Literature given     2. Bacterial vaginosis  - Start metrogel vaginally   - Literature given       Followup: Return for regular follow up with PCP .    Signed by: Marcelina Green M.D.

## 2019-01-25 DIAGNOSIS — N31.9 NEUROGENIC BLADDER: ICD-10-CM

## 2019-01-25 NOTE — TELEPHONE ENCOUNTER
Was the patient seen in the last year in this department? Yes    Does patient have an active prescription for medications requested? No     Received Request Via: Patient Pt last seen on: 09/26/2018 by Dr. Norma Montalvo appt on: 02/11/2019

## 2019-01-27 RX ORDER — NITROFURANTOIN MACROCRYSTALS 50 MG/1
CAPSULE ORAL
Qty: 30 CAP | Refills: 3 | Status: SHIPPED | OUTPATIENT
Start: 2019-01-27 | End: 2021-11-10 | Stop reason: SDUPTHER

## 2019-02-11 ENCOUNTER — OFFICE VISIT (OUTPATIENT)
Dept: INTERNAL MEDICINE | Facility: MEDICAL CENTER | Age: 44
End: 2019-02-11
Payer: MEDICAID

## 2019-02-11 VITALS
HEART RATE: 68 BPM | HEIGHT: 65 IN | WEIGHT: 176 LBS | TEMPERATURE: 96.8 F | RESPIRATION RATE: 19 BRPM | DIASTOLIC BLOOD PRESSURE: 84 MMHG | OXYGEN SATURATION: 96 % | SYSTOLIC BLOOD PRESSURE: 138 MMHG | BODY MASS INDEX: 29.32 KG/M2

## 2019-02-11 DIAGNOSIS — R87.618 PAP SMEAR ABNORMALITY OF CERVIX/HUMAN PAPILLOMAVIRUS (HPV) POSITIVE: ICD-10-CM

## 2019-02-11 PROCEDURE — 99213 OFFICE O/P EST LOW 20 MIN: CPT | Mod: GE | Performed by: INTERNAL MEDICINE

## 2019-02-11 RX ORDER — ESTRADIOL 0.07 MG/D
1 PATCH TRANSDERMAL
COMMUNITY
End: 2020-02-03

## 2019-02-11 ASSESSMENT — PAIN SCALES - GENERAL: PAINLEVEL: 6=MODERATE PAIN

## 2019-02-11 NOTE — PROGRESS NOTES
Established Patient    Corazon presents today with the following:    CC: Follow-up visit    HPI: Corazon Berg is a 43 y.o. female with past medical history of bacterial vaginosis completed metronidazole treatment, human papilloma virus positive on Pap smear.  Patient was referred to the gynecologist.  She underwent endometrial biopsy for abnormal uterine bleeding resulting in benign finding.  Patient will need to repeat Pap smear on August 2019 for abnormal Pap smear with human papillomavirus positive.  Patient has perimenopausal symptoms following the endometrial biopsy procedure.  Currently on estrogen and progesterone patch, reported significant improvement hot flashes and vaginal dryness.  She is currently not sexually active, denies any other foul-smelling, bleeding symptoms.      Review of Systems:     Constitutional: Denies fevers, Denies weight changes  Eyes: Denies changes in vision, no eye pain  Ears/Nose/Throat/Mouth: Denies nasal congestion or sore throat   Cardiovascular: Denies chest pain or palpitations   Respiratory: Denies shortness of breath , Denies cough  Gastrointestinal/Hepatic: Denies abdominal pain, nausea, vomiting, diarrhea or constipation.  Genitourinary: Denies bladder dysfunction, dysuria or frequency  Musculoskeletal/Rheum: Denies  joint pain and swelling   Skin: Denies rash.  Neurological: Denies headache, confusion, memory loss or focal weakness/parasthesias  Psychiatric: denies mood disorder         Patient Active Problem List    Diagnosis Date Noted   • SBO (small bowel obstruction) ([facility]) 03/18/2013     Priority: High   • Abdominal pain, bilateral lower quadrant 03/18/2013     Priority: High   • Chronic narcotic dependence ([facility]) 03/26/2014     Priority: Medium   • Neurogenic bladder-straight cath  03/26/2014     Priority: Low   • PTSD (post-traumatic stress disorder) 03/26/2014     Priority: Low   • Hypokalemia 03/26/2014     Priority: Low   • Hypophosphatemia 03/26/2014      "Priority: Low   • HPV (human papilloma virus) infection 09/26/2018   • Bacterial vaginosis 09/26/2018   • Adenoma of left adrenal gland 07/17/2018   • Simple ovarian cyst 07/17/2018   • Leukocytosis 03/26/2014   • Elevated liver enzymes 03/26/2014   • Unspecified complication of colostomy or enterostomy 01/04/2013       Current Outpatient Prescriptions   Medication Sig Dispense Refill   • estradiol (CLIMARA) 0.075 MG/24HR PATCH WEEKLY Apply 1 Patch to skin as directed every 7 days.     • progesterone (PROMETRIUM) 200 MG capsule Take 200 mg by mouth every day.     • nitrofurantoin (MACRODANTIN) 50 MG Cap TAKE ONE CAPSULE BY MOUTH AS NEEDED FOR  UTI  RELATED  TO  SELF  CATHETER 30 Cap 3   • oxyCODONE (ROXICODONE) 5 MG/5ML solution Take 5 mg by mouth.     • Bacillus Coagulans-Inulin (PROBIOTIC FORMULA) 1-250 BILLION-MG Cap Take  by mouth.     • HYDROmorphone (DILAUDID) 2 MG Tab Take 2-4 mg by mouth every 3 hours as needed for Severe Pain.     • clonazepam (KLONOPIN) 0.5 MG Tab Take 0.5 mg by mouth 2 times a day.     • mirtazapine (REMERON) 15 MG Tab Take 7.5 mg by mouth every evening.     • cyanocobalamin (VITAMIN B-12) 1000 MCG/ML SOLN 1,000 mcg by Intramuscular route every 14 days.     • metroNIDAZOLE (METROGEL-VAGINAL) 0.75 % Gel Insert 1 Applicator in vagina every bedtime. (Patient not taking: Reported on 2/11/2019) 1 Tube 0   • NARCAN 4 MG/0.1ML Liquid 4 mg.     • oxycodone 20 MG/ML Conc Take 10 mg by mouth every four hours as needed.     • sertraline (ZOLOFT) 20 MG/ML concentrated solution Take 3.5 mg by mouth every day.     • tramadol (ULTRAM) 50 MG TABS Take  mg by mouth every four hours as needed.       No current facility-administered medications for this visit.        /84 (BP Location: Left arm, Patient Position: Sitting, BP Cuff Size: Adult long)   Pulse 68   Temp 36 °C (96.8 °F) (Temporal)   Resp 19   Ht 1.651 m (5' 5\")   Wt 79.8 kg (176 lb)   LMP 12/15/2018   SpO2 96%   Breastfeeding? " No   BMI 29.29 kg/m²     Physical Exam   Constitutional:  Comfortable. No acute distress.   Eyes: Pupils are equal, round, and reactive to light. No scleral icterus.  Neck: Neck supple. No thyromegaly present.   Cardiovascular: Normal rate, regular rhythm and normal heart sounds.  Exam reveals no gallop and no friction rub.  No murmur heard.  Pulmonary/Chest: Lungs clear to ascultation bilaterally. Breath sounds normal. No rhonchi, wheezes or crackles.   Musculoskeletal:   No deformity noted. no edema.   Lymphadenopathy: no cervical adenopathy  Neurological: alert and oriented to person, place, and time. Cranial nerves 2-12 grossly intact. No obvious motor or sensory deficits.  Extremities: No edema. No clubbing. No cyanosis. Distal pulses 2+ bilaterally.  Skin: No Rash. No cyanosis. Nails show no clubbing.    Assessment and Plan  1. Pap smear abnormality of cervix/human papillomavirus (HPV) positive  -Benign endometrial biopsy result Dr. Cohen gynecologist  -Plan is to repeat Pap smear on August 2019, look for HPV clearance.  If positive again then will need a repeat colposcopy by gynecology.  -Educated about using condoms  -She will need repeat breast ultrasound screening for breast cancer on September 2019      Signed by: Meseret Ballard M.D.

## 2020-02-03 ENCOUNTER — HOSPITAL ENCOUNTER (INPATIENT)
Facility: MEDICAL CENTER | Age: 45
LOS: 3 days | DRG: 389 | End: 2020-02-06
Attending: EMERGENCY MEDICINE | Admitting: INTERNAL MEDICINE
Payer: MEDICAID

## 2020-02-03 ENCOUNTER — APPOINTMENT (OUTPATIENT)
Dept: RADIOLOGY | Facility: MEDICAL CENTER | Age: 45
DRG: 389 | End: 2020-02-03
Attending: EMERGENCY MEDICINE
Payer: MEDICAID

## 2020-02-03 DIAGNOSIS — K56.609 SMALL BOWEL OBSTRUCTION (HCC): ICD-10-CM

## 2020-02-03 DIAGNOSIS — R11.2 INTRACTABLE VOMITING WITH NAUSEA, UNSPECIFIED VOMITING TYPE: ICD-10-CM

## 2020-02-03 LAB
ALBUMIN SERPL BCP-MCNC: 4.3 G/DL (ref 3.2–4.9)
ALBUMIN/GLOB SERPL: 1.2 G/DL
ALP SERPL-CCNC: 55 U/L (ref 30–99)
ALT SERPL-CCNC: 15 U/L (ref 2–50)
ANION GAP SERPL CALC-SCNC: 8 MMOL/L (ref 0–11.9)
APPEARANCE UR: CLEAR
AST SERPL-CCNC: 28 U/L (ref 12–45)
BASOPHILS # BLD AUTO: 0.4 % (ref 0–1.8)
BASOPHILS # BLD: 0.04 K/UL (ref 0–0.12)
BILIRUB SERPL-MCNC: 0.5 MG/DL (ref 0.1–1.5)
BILIRUB UR QL STRIP.AUTO: NEGATIVE
BUN SERPL-MCNC: 8 MG/DL (ref 8–22)
CALCIUM SERPL-MCNC: 9.7 MG/DL (ref 8.5–10.5)
CHLORIDE SERPL-SCNC: 100 MMOL/L (ref 96–112)
CO2 SERPL-SCNC: 29 MMOL/L (ref 20–33)
COLOR UR: YELLOW
CREAT SERPL-MCNC: 0.83 MG/DL (ref 0.5–1.4)
EKG IMPRESSION: NORMAL
EOSINOPHIL # BLD AUTO: 0.05 K/UL (ref 0–0.51)
EOSINOPHIL NFR BLD: 0.5 % (ref 0–6.9)
ERYTHROCYTE [DISTWIDTH] IN BLOOD BY AUTOMATED COUNT: 43.2 FL (ref 35.9–50)
GLOBULIN SER CALC-MCNC: 3.7 G/DL (ref 1.9–3.5)
GLUCOSE SERPL-MCNC: 131 MG/DL (ref 65–99)
GLUCOSE UR STRIP.AUTO-MCNC: NEGATIVE MG/DL
HCG SERPL QL: NEGATIVE
HCT VFR BLD AUTO: 47.1 % (ref 37–47)
HGB BLD-MCNC: 15 G/DL (ref 12–16)
IMM GRANULOCYTES # BLD AUTO: 0.05 K/UL (ref 0–0.11)
IMM GRANULOCYTES NFR BLD AUTO: 0.5 % (ref 0–0.9)
KETONES UR STRIP.AUTO-MCNC: NEGATIVE MG/DL
LEUKOCYTE ESTERASE UR QL STRIP.AUTO: NEGATIVE
LIPASE SERPL-CCNC: 22 U/L (ref 11–82)
LYMPHOCYTES # BLD AUTO: 0.86 K/UL (ref 1–4.8)
LYMPHOCYTES NFR BLD: 8.1 % (ref 22–41)
MAGNESIUM SERPL-MCNC: 1.7 MG/DL (ref 1.5–2.5)
MCH RBC QN AUTO: 29.5 PG (ref 27–33)
MCHC RBC AUTO-ENTMCNC: 31.8 G/DL (ref 33.6–35)
MCV RBC AUTO: 92.7 FL (ref 81.4–97.8)
MICRO URNS: NORMAL
MONOCYTES # BLD AUTO: 0.6 K/UL (ref 0–0.85)
MONOCYTES NFR BLD AUTO: 5.6 % (ref 0–13.4)
NEUTROPHILS # BLD AUTO: 9.04 K/UL (ref 2–7.15)
NEUTROPHILS NFR BLD: 84.9 % (ref 44–72)
NITRITE UR QL STRIP.AUTO: NEGATIVE
NRBC # BLD AUTO: 0 K/UL
NRBC BLD-RTO: 0 /100 WBC
PH UR STRIP.AUTO: 8 [PH] (ref 5–8)
PLATELET # BLD AUTO: 215 K/UL (ref 164–446)
PMV BLD AUTO: 10.3 FL (ref 9–12.9)
POTASSIUM SERPL-SCNC: 4 MMOL/L (ref 3.6–5.5)
PROT SERPL-MCNC: 8 G/DL (ref 6–8.2)
PROT UR QL STRIP: NEGATIVE MG/DL
RBC # BLD AUTO: 5.08 M/UL (ref 4.2–5.4)
RBC UR QL AUTO: NEGATIVE
SODIUM SERPL-SCNC: 137 MMOL/L (ref 135–145)
SP GR UR STRIP.AUTO: 1.02
UROBILINOGEN UR STRIP.AUTO-MCNC: 0.2 MG/DL
WBC # BLD AUTO: 10.6 K/UL (ref 4.8–10.8)

## 2020-02-03 PROCEDURE — 96376 TX/PRO/DX INJ SAME DRUG ADON: CPT

## 2020-02-03 PROCEDURE — 700111 HCHG RX REV CODE 636 W/ 250 OVERRIDE (IP): Performed by: EMERGENCY MEDICINE

## 2020-02-03 PROCEDURE — 74177 CT ABD & PELVIS W/CONTRAST: CPT

## 2020-02-03 PROCEDURE — 96375 TX/PRO/DX INJ NEW DRUG ADDON: CPT

## 2020-02-03 PROCEDURE — 93010 ELECTROCARDIOGRAM REPORT: CPT | Performed by: INTERNAL MEDICINE

## 2020-02-03 PROCEDURE — 83735 ASSAY OF MAGNESIUM: CPT

## 2020-02-03 PROCEDURE — 80053 COMPREHEN METABOLIC PANEL: CPT

## 2020-02-03 PROCEDURE — 770006 HCHG ROOM/CARE - MED/SURG/GYN SEMI*

## 2020-02-03 PROCEDURE — 304538 HCHG NG TUBE

## 2020-02-03 PROCEDURE — 700105 HCHG RX REV CODE 258: Performed by: EMERGENCY MEDICINE

## 2020-02-03 PROCEDURE — 83690 ASSAY OF LIPASE: CPT

## 2020-02-03 PROCEDURE — 96374 THER/PROPH/DIAG INJ IV PUSH: CPT

## 2020-02-03 PROCEDURE — 85025 COMPLETE CBC W/AUTO DIFF WBC: CPT

## 2020-02-03 PROCEDURE — 99223 1ST HOSP IP/OBS HIGH 75: CPT | Mod: GC | Performed by: INTERNAL MEDICINE

## 2020-02-03 PROCEDURE — 700111 HCHG RX REV CODE 636 W/ 250 OVERRIDE (IP): Performed by: STUDENT IN AN ORGANIZED HEALTH CARE EDUCATION/TRAINING PROGRAM

## 2020-02-03 PROCEDURE — 99285 EMERGENCY DEPT VISIT HI MDM: CPT

## 2020-02-03 PROCEDURE — 81003 URINALYSIS AUTO W/O SCOPE: CPT

## 2020-02-03 PROCEDURE — 84703 CHORIONIC GONADOTROPIN ASSAY: CPT

## 2020-02-03 PROCEDURE — 93005 ELECTROCARDIOGRAM TRACING: CPT | Performed by: STUDENT IN AN ORGANIZED HEALTH CARE EDUCATION/TRAINING PROGRAM

## 2020-02-03 PROCEDURE — 700105 HCHG RX REV CODE 258: Performed by: STUDENT IN AN ORGANIZED HEALTH CARE EDUCATION/TRAINING PROGRAM

## 2020-02-03 PROCEDURE — 306783: Performed by: INTERNAL MEDICINE

## 2020-02-03 PROCEDURE — 700117 HCHG RX CONTRAST REV CODE 255: Performed by: EMERGENCY MEDICINE

## 2020-02-03 RX ORDER — ONDANSETRON 4 MG/1
4 TABLET, ORALLY DISINTEGRATING ORAL EVERY 4 HOURS PRN
Status: DISCONTINUED | OUTPATIENT
Start: 2020-02-03 | End: 2020-02-03

## 2020-02-03 RX ORDER — HYDROMORPHONE HYDROCHLORIDE 1 MG/ML
1 INJECTION, SOLUTION INTRAMUSCULAR; INTRAVENOUS; SUBCUTANEOUS EVERY 4 HOURS PRN
Status: DISCONTINUED | OUTPATIENT
Start: 2020-02-03 | End: 2020-02-04

## 2020-02-03 RX ORDER — OXYCODONE HYDROCHLORIDE 10 MG/1
10 TABLET ORAL EVERY 4 HOURS PRN
COMMUNITY
End: 2021-11-10

## 2020-02-03 RX ORDER — ONDANSETRON 2 MG/ML
4 INJECTION INTRAMUSCULAR; INTRAVENOUS EVERY 4 HOURS PRN
Status: DISCONTINUED | OUTPATIENT
Start: 2020-02-03 | End: 2020-02-04

## 2020-02-03 RX ORDER — PROMETHAZINE HYDROCHLORIDE 25 MG/1
12.5-25 TABLET ORAL EVERY 4 HOURS PRN
Status: DISCONTINUED | OUTPATIENT
Start: 2020-02-03 | End: 2020-02-03

## 2020-02-03 RX ORDER — HYDROMORPHONE HYDROCHLORIDE 1 MG/ML
1 INJECTION, SOLUTION INTRAMUSCULAR; INTRAVENOUS; SUBCUTANEOUS ONCE
Status: COMPLETED | OUTPATIENT
Start: 2020-02-03 | End: 2020-02-03

## 2020-02-03 RX ORDER — MORPHINE SULFATE 4 MG/ML
4 INJECTION, SOLUTION INTRAMUSCULAR; INTRAVENOUS EVERY 4 HOURS PRN
Status: DISCONTINUED | OUTPATIENT
Start: 2020-02-03 | End: 2020-02-03

## 2020-02-03 RX ORDER — PROCHLORPERAZINE EDISYLATE 5 MG/ML
5-10 INJECTION INTRAMUSCULAR; INTRAVENOUS EVERY 4 HOURS PRN
Status: DISCONTINUED | OUTPATIENT
Start: 2020-02-03 | End: 2020-02-03

## 2020-02-03 RX ORDER — SODIUM CHLORIDE 9 MG/ML
1000 INJECTION, SOLUTION INTRAVENOUS ONCE
Status: COMPLETED | OUTPATIENT
Start: 2020-02-03 | End: 2020-02-03

## 2020-02-03 RX ORDER — PROMETHAZINE HYDROCHLORIDE 25 MG/1
12.5-25 SUPPOSITORY RECTAL EVERY 4 HOURS PRN
Status: DISCONTINUED | OUTPATIENT
Start: 2020-02-03 | End: 2020-02-03

## 2020-02-03 RX ORDER — ONDANSETRON 2 MG/ML
4 INJECTION INTRAMUSCULAR; INTRAVENOUS ONCE
Status: COMPLETED | OUTPATIENT
Start: 2020-02-03 | End: 2020-02-03

## 2020-02-03 RX ORDER — SODIUM CHLORIDE 9 MG/ML
INJECTION, SOLUTION INTRAVENOUS CONTINUOUS
Status: DISCONTINUED | OUTPATIENT
Start: 2020-02-03 | End: 2020-02-04

## 2020-02-03 RX ORDER — ACETAMINOPHEN 325 MG/1
650 TABLET ORAL EVERY 6 HOURS PRN
Status: DISCONTINUED | OUTPATIENT
Start: 2020-02-03 | End: 2020-02-06 | Stop reason: HOSPADM

## 2020-02-03 RX ORDER — CLONAZEPAM 0.5 MG/1
0.5 TABLET ORAL 2 TIMES DAILY
Status: DISCONTINUED | OUTPATIENT
Start: 2020-02-04 | End: 2020-02-06 | Stop reason: HOSPADM

## 2020-02-03 RX ADMIN — SODIUM CHLORIDE 1000 ML: 9 INJECTION, SOLUTION INTRAVENOUS at 07:55

## 2020-02-03 RX ADMIN — MORPHINE SULFATE 4 MG: 4 INJECTION INTRAVENOUS at 13:54

## 2020-02-03 RX ADMIN — HYDROMORPHONE HYDROCHLORIDE 1 MG: 1 INJECTION, SOLUTION INTRAMUSCULAR; INTRAVENOUS; SUBCUTANEOUS at 20:52

## 2020-02-03 RX ADMIN — ONDANSETRON 4 MG: 2 INJECTION INTRAMUSCULAR; INTRAVENOUS at 07:56

## 2020-02-03 RX ADMIN — IOHEXOL 25 ML: 240 INJECTION, SOLUTION INTRATHECAL; INTRAVASCULAR; INTRAVENOUS; ORAL at 09:07

## 2020-02-03 RX ADMIN — HYDROMORPHONE HYDROCHLORIDE 1 MG: 1 INJECTION, SOLUTION INTRAMUSCULAR; INTRAVENOUS; SUBCUTANEOUS at 07:56

## 2020-02-03 RX ADMIN — ONDANSETRON 4 MG: 2 INJECTION INTRAMUSCULAR; INTRAVENOUS at 23:19

## 2020-02-03 RX ADMIN — SODIUM CHLORIDE: 9 INJECTION, SOLUTION INTRAVENOUS at 14:10

## 2020-02-03 RX ADMIN — HYDROMORPHONE HYDROCHLORIDE 1 MG: 1 INJECTION, SOLUTION INTRAMUSCULAR; INTRAVENOUS; SUBCUTANEOUS at 16:48

## 2020-02-03 RX ADMIN — ENOXAPARIN SODIUM 40 MG: 100 INJECTION SUBCUTANEOUS at 13:56

## 2020-02-03 RX ADMIN — HYDROMORPHONE HYDROCHLORIDE 1 MG: 1 INJECTION, SOLUTION INTRAMUSCULAR; INTRAVENOUS; SUBCUTANEOUS at 09:44

## 2020-02-03 RX ADMIN — IOHEXOL 100 ML: 350 INJECTION, SOLUTION INTRAVENOUS at 09:07

## 2020-02-03 ASSESSMENT — LIFESTYLE VARIABLES
HAVE PEOPLE ANNOYED YOU BY CRITICIZING YOUR DRINKING: NO
EVER HAD A DRINK FIRST THING IN THE MORNING TO STEADY YOUR NERVES TO GET RID OF A HANGOVER: NO
HOW MANY TIMES IN THE PAST YEAR HAVE YOU HAD 5 OR MORE DRINKS IN A DAY: 0
CONSUMPTION TOTAL: NEGATIVE
EVER FELT BAD OR GUILTY ABOUT YOUR DRINKING: NO
EVER_SMOKED: YES
ALCOHOL_USE: NO
ON A TYPICAL DAY WHEN YOU DRINK ALCOHOL HOW MANY DRINKS DO YOU HAVE: 0
TOTAL SCORE: 0
HAVE YOU EVER FELT YOU SHOULD CUT DOWN ON YOUR DRINKING: NO
AVERAGE NUMBER OF DAYS PER WEEK YOU HAVE A DRINK CONTAINING ALCOHOL: 0
TOTAL SCORE: 0
TOTAL SCORE: 0

## 2020-02-03 ASSESSMENT — ENCOUNTER SYMPTOMS
SHORTNESS OF BREATH: 0
COUGH: 0
NAUSEA: 1
MEMORY LOSS: 0
PND: 0
VOMITING: 1
WEIGHT LOSS: 0
BLURRED VISION: 0
DEPRESSION: 0
HEADACHES: 0
FEVER: 0
PALPITATIONS: 0
CHILLS: 0
SORE THROAT: 0
ABDOMINAL PAIN: 1
LOSS OF CONSCIOUSNESS: 0
PHOTOPHOBIA: 0
DOUBLE VISION: 0
DIZZINESS: 0
SPEECH CHANGE: 0
FALLS: 0
SPUTUM PRODUCTION: 0
BLOOD IN STOOL: 0
BACK PAIN: 0
DIARRHEA: 0
SENSORY CHANGE: 0
HEARTBURN: 0
SEIZURES: 0
WHEEZING: 0
WEAKNESS: 0
DIAPHORESIS: 0
ORTHOPNEA: 0
MYALGIAS: 0

## 2020-02-03 ASSESSMENT — COGNITIVE AND FUNCTIONAL STATUS - GENERAL
SUGGESTED CMS G CODE MODIFIER DAILY ACTIVITY: CH
SUGGESTED CMS G CODE MODIFIER MOBILITY: CH
MOBILITY SCORE: 24
DAILY ACTIVITIY SCORE: 24

## 2020-02-03 ASSESSMENT — PATIENT HEALTH QUESTIONNAIRE - PHQ9
SUM OF ALL RESPONSES TO PHQ9 QUESTIONS 1 AND 2: 0
2. FEELING DOWN, DEPRESSED, IRRITABLE, OR HOPELESS: NOT AT ALL
1. LITTLE INTEREST OR PLEASURE IN DOING THINGS: NOT AT ALL

## 2020-02-03 NOTE — CARE PLAN
Problem: Safety  Goal: Will remain free from injury  Outcome: PROGRESSING AS EXPECTED  Note:   Patient uses call bell appropriately.  Doesn't get up without help.      Problem: Venous Thromboembolism (VTW)/Deep Vein Thrombosis (DVT) Prevention:  Goal: Patient will participate in Venous Thrombosis (VTE)/Deep Vein Thrombosis (DVT)Prevention Measures  Outcome: PROGRESSING AS EXPECTED  Flowsheets (Taken 2/3/2020 1783)  Pharmacologic Prophylaxis Used: LMWH: Enoxaparin(Lovenox)  Note:   Patient on lovenox.  Also is ambulatory.

## 2020-02-03 NOTE — DISCHARGE PLANNING
Anticipated Discharge Disposition: TBD    Action: spoke to pt at bedside, pt states that she lives by herself in [place], [place].  Pt states she drives, pt on disability, although declined to answer why she is. Pt states she has no support system. Declines to list Emergency contacts.    Barriers to Discharge: medical clearance    Plan: f/u with medical team, pt        Care Transition Team Assessment    Information Source  Orientation : Oriented x 4  Information Given By: Patient  Who is responsible for making decisions for patient? : Patient    Readmission Evaluation  Is this a readmission?: No    Elopement Risk  Legal Hold: No         Discharge Preparedness  What is your plan after discharge?: Uncertain - pending medical team collaboration  What are your discharge supports?: (pt states none)  Prior Functional Level: Independent with Activities of Daily Living    Functional Assesment  Prior Functional Level: Independent with Activities of Daily Living    Finances  Financial Barriers to Discharge: No              Advance Directive  Advance Directive?: None  Advance Directive offered?: AD Booklet refused    Domestic Abuse  Have you ever been the victim of abuse or violence?: No              Anticipated Discharge Information  Anticipated discharge disposition: (TBD)

## 2020-02-03 NOTE — SENIOR ADMIT NOTE
SENIOR ADMIT NOTE     CC: Worsening nausea vomiting with abdominal pain since last night.     HPI: Ms. Berg is a pleasant 44-year-old woman with a complicated abdominal and gynecological surgical history, who presents with new onset of right lower quadrant pain and obstipation since 9 PM last night.  She reports she stopped passing gas around that time and then started vomiting at 10:30 PM.  She called EMS this morning because she felt she was having a bowel obstruction.  She does have a history of multiple bowel obstructions last one was 2015 when she was admitted to [place] for a exploratory laparotomy and lysis of adhesions on the right.  Prior to that, she was admitted to [facility] in 2014 again for a small bowel obstruction which was alleviated with the NG tube placement.    Her initial surgery was at the age of 22 for endometriosis.  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  Her postoperative courses were also complicated with bacteremia and sepsis.  Per records she has had 13 inches of large colon removed as well as 4 inches of left colon removed with a splenectomy and cholecystectomy.  She reports having had a small bowel resection with a Uzma pouch at one point, which was taken down by Dr. Gutierrez.  She reports self cathing since 1998 due to both stress and urge incontinence.  Apparently in 2012, a surgeon performed uro-Sarco nerve ablation in order to treat her abdominal pain without her permission leading to a malpractice lawsuit.  She reports being on oxycodone and Dilaudid for 18 to 19 years due to her persistent lower abdominal pain.  She also reports a history of short bowel syndrome and has multiple loose bowel months daily, which stopped last night.  She denies any fevers or chills.      ER course: NG tube placed    Physical exam:    Vitals:    02/03/20 1116   BP: 143/73   Pulse: 72   Temp:    SpO2: 97%        Laboratory studies:  White count 10.6, hemoglobin 15, platelets 215.  Can panel is within normal limits with elevated glucose of 131.  Globulin increased at 2.7.  Urinalysis is negative.  Beta hCG negative.      Imaging studies: CT scan shows evidence of high-grade partial small bowel obstruction with dilatation of the small bowel in the right lower quadrant with surrounding fluid around the colon.  No free air.       Assessment and plan:  #Partial bowel obstruction  #Short-bowel syndrome  #Chronic narcotic medication use  #Neurogenic bladder     Summary this is a 44-year-old woman with a complicated gynecological and abdominal surgical history including a previous bowel resection and ileostomy as well as a rectocele fistula, who presents with recurrent partial small bowel obstruction.  Dr. Ronak Cuellar according to the ER physician will be evaluating the patient.    -Admit to surgery for admission.  -Continue NG tube to low intermittent suction.  -Keep n.p.o.  -IV fluids.  -Serial abdominal exams.  -Zofran for nausea.  -Morphine IV as needed for pain.  Can escalate to Dilaudid if ineffective.  -Continue pulse oximetry.  -Heparin for DVT prophylaxis.  -Full code.       Please see Dr. Mercado's history and physical for complete details.

## 2020-02-03 NOTE — H&P
History & Physical Note    Date of Admission: 2/3/2020  Admission Status: Inpatient  [facility] Team: [facility] Green Team  Attending: Ben Rhodes M.D.   Senior Resident: Dr. Stacy  Intern: Dr. Mercado  Contact Number: [phone]    Chief Complaint: Abdominal pain    History of Present Illness (HPI): Ms. Berg is a 44 y.o. female with PMHx of multiple abdominal surgeries in the past with 2 episodes of mechanical small bowel obstruction, last one in 2015 and neurogenic bladder, doing straight cath 3 times per day for the past 15 years who presented on 2/3/2020 with a chief complaint of abdominal pain and nausea/vomiting. She suddenly started having right upper quadrant, 10/10, crampy, continuous pain radiating to right loin and right side of abdomen at 9 PM yesterday associated with nausea. She had total 7 episodes of nonbloody vomiting overnight with continuous abdominal pain which made her to call EMS in a.m. per patient, the pain was similar in nature with her previous 2 episodes of SBO. She normally has 7-8 bowel movement in a day with loose stools secondary to multiple colectomy but since yesterday night she did not have any bowel movement and she is not able to pass any gas. Her last mechanical SBO episode was in 2015 which did not require any surgical intervention. She denies any fever, chills, hematemesis, urinary symptoms.  She has been on opioids (oxycodone and Dilaudid) for the past many years for chronic abdominal pain; she has been following up regularly with pain specialist and with Dr. Lopez  from GI consultant.    In ED, patient was afebrile vital signs were WNL, saturation in 90s on room air. On examination, she had diffuse mild abdominal tenderness more over the right side of abdomen without any guarding or rigidity with hyperactive bowel sounds; multiple surgical scars from previous surgeries present. CBC showed WBC of 10.6 with neutrophils of 9.4; electrolytes, BUN and creatinine were WNL. CT abdomen  pelvis showed subtotal dilation of small bowel consistent with high-grade partial small bowel obstruction.       Review of Systems: Review of Systems   Constitutional: Negative for chills, diaphoresis, fever, malaise/fatigue and weight loss.   HENT: Negative for congestion, nosebleeds and sore throat.    Eyes: Negative for blurred vision, double vision and photophobia.   Respiratory: Negative for cough, sputum production, shortness of breath and wheezing.    Cardiovascular: Negative for chest pain, palpitations, orthopnea, leg swelling and PND.   Gastrointestinal: Positive for abdominal pain (diffuse crampy abdominal pain more over roght side), nausea and vomiting. Negative for blood in stool, diarrhea, heartburn and melena.   Genitourinary: Negative for dysuria, hematuria and urgency.   Musculoskeletal: Negative for back pain, falls and myalgias.   Skin: Negative for itching and rash.   Neurological: Negative for dizziness, sensory change, speech change, seizures, loss of consciousness, weakness and headaches.   Psychiatric/Behavioral: Negative for depression and memory loss.       Past Medical History:   Past Medical History was reviewed with patient.   has a past medical history of Bowel perforation (HCC), Heart murmur, Kidney infection, Obstruction, Other specified disorder of intestines, Other specified symptom associated with female genital organs, Pain (3/14/13), Pneumonia (1992), Pneumothorax, PTSD (post-traumatic stress disorder), SBO (small bowel obstruction) (HCC), Sepsis(995.91) (1997&1998), Unspecified urinary incontinence, Urethra disorder, and Urinary bladder disorder.    Past Surgical History: Past Surgical History was reviewed with patient.   has a past surgical history that includes colostomy (6/11/12); cath place groshong; recovery (1/4/2013); other (5/25/12); other abdominal surgery; appendectomy; other abdominal surgery; other abdominal surgery (6/11/2012); exploratory laparotomy (3/18/2013);  "lysis adhesions general (3/18/2013); colon resection (3/18/2013); colostomy takedown (3/18/2013); and bartholin gland excision (3/7/2014).    Medications: Medications have been reviewed with patient.  Prior to Admission Medications   Prescriptions Last Dose Informant Patient Reported? Taking?   Bacillus Coagulans-Inulin (PROBIOTIC FORMULA) 1-250 BILLION-MG Cap FEW DAYS AGO at [facility] Patient Yes No   Sig: Take 1 Tab by mouth every day.   HYDROmorphone (DILAUDID) 2 MG Tab 2020 at PM Patient Yes No   Sig: Take 2 mg by mouth 2 Times a Day.   NARCAN 4 MG/0.1ML Liquid PRN at PRN Patient Yes No   Sig: Spray 1 Spray in nose as needed.   clonazepam (KLONOPIN) 0.5 MG Tab 2020 at PM Patient Yes No   Sig: Take 0.5 mg by mouth 3 times a day as needed.   cyanocobalamin (VITAMIN B-12) 1000 MCG/ML SOLN UNK at [facility] Patient Yes No   Si,000 mcg by Intramuscular route every 7 days.   mirtazapine (REMERON) 15 MG Tab 2020 at PM Patient Yes No   Sig: Take 3.75-7.5 mg by mouth every evening.   nitrofurantoin (MACRODANTIN) 50 MG Cap FEW DAYS AGO at [facility] Patient No No   Sig: TAKE ONE CAPSULE BY MOUTH AS NEEDED FOR  UTI  RELATED  TO  SELF  CATHETER   oxyCODONE immediate release (ROXICODONE) 10 MG immediate release tablet 2020 at PM Patient Yes Yes   Sig: Take 10 mg by mouth 6 Times a Day.      Facility-Administered Medications: None        Allergies: Allergies have been reviewed with patient.  Allergies   Allergen Reactions   • Sammy-1 [Lidocaine Hcl] Anaphylaxis     Topical or injectable, \"subq numbing anesthetics\"  \"Causes Methemoglobinemia.\"   • Pcn [Penicillins] Rash       Family History: No significant family history    Social History:   Tobacco: None   Alcohol: None  Recreational drugs (illegal and prescription):  None   Living situation: Lives alone  Recent travel:  None  Primary Care Provider: reviewed Marcelina Green M.D.  Other (stressors, spirituality, exposures):  None    Physical Exam:    Vitals:  Temp:  [36.9 °C " (98.5 °F)-37.1 °C (98.8 °F)] 36.9 °C (98.5 °F)  Pulse:  [63-73] 70  Resp:  [16-17] 17  BP: (113-152)/(58-97) 130/84  SpO2:  [90 %-99 %] 95 %    Physical Exam  Constitutional:       General: She is not in acute distress.     Appearance: Normal appearance. She is not ill-appearing, toxic-appearing or diaphoretic.   HENT:      Head: Normocephalic and atraumatic.      Nose: Nose normal. No congestion or rhinorrhea.      Mouth/Throat:      Mouth: Mucous membranes are dry.      Pharynx: No oropharyngeal exudate or posterior oropharyngeal erythema.   Eyes:      Extraocular Movements: Extraocular movements intact.      Pupils: Pupils are equal, round, and reactive to light.   Neck:      Musculoskeletal: Normal range of motion and neck supple. No neck rigidity or muscular tenderness.   Cardiovascular:      Rate and Rhythm: Normal rate and regular rhythm.      Heart sounds: No murmur. No friction rub. No gallop.    Pulmonary:      Effort: Pulmonary effort is normal. No respiratory distress.      Breath sounds: Normal breath sounds. No wheezing, rhonchi or rales.   Abdominal:      General: There is distension.      Palpations: There is no mass.      Tenderness: There is tenderness. There is no guarding or rebound.      Hernia: No hernia is present.      Comments: Distended, moderately tender abdomen with multiple visible scars from previous surgeries   Musculoskeletal: Normal range of motion.         General: No swelling or tenderness.   Skin:     General: Skin is dry.      Coloration: Skin is not jaundiced or pale.      Findings: No bruising or erythema.   Neurological:      General: No focal deficit present.      Mental Status: She is alert and oriented to person, place, and time.   Psychiatric:         Mood and Affect: Mood normal.         Behavior: Behavior normal.         Labs:     Recent Labs     02/03/20  0715   WBC 10.6   RBC 5.08   HEMOGLOBIN 15.0   HEMATOCRIT 47.1*   MCV 92.7   MCH 29.5   RDW 43.2   PLATELETCT 215    MPV 10.3   NEUTSPOLYS 84.90*   LYMPHOCYTES 8.10*   MONOCYTES 5.60   EOSINOPHILS 0.50   BASOPHILS 0.40     Recent Labs     02/03/20  0715   SODIUM 137   POTASSIUM 4.0   CHLORIDE 100   CO2 29   GLUCOSE 131*   BUN 8       Imaging:     CT-ABDOMEN-PELVIS WITH   Final Result      1.  Subtotal dilatation of the small bowel with possible transition point in the right lower quadrant consistent with high-grade partial small bowel obstruction.      2.  Fluid and stool throughout the colon.      3.  Postoperative changes in the proximal and distal colon.      4.  No free air or abscess.            Previous Data Review: reviewed     Assessment and Plan:    * SBO (small bowel obstruction) (HCC)- (present on admission)  Assessment & Plan  -Patient presented with sudden onset, crampy, 10/10, abdominal pain with nausea and vomiting from yesterday night; is not able to pass any gas, did not have any bowel movements since yesterday; history of multiple abdominal surgeries and 2 episodes of mechanical small bowel obstruction, last was in 2015 which did not require surgery  -patient was afebrile vital signs were WNL, saturation in 90s on room air.  -On examination, she had diffuse mild abdominal tenderness more over the right side of abdomen without any guarding or rigidity with hyperactive bowel sounds; multiple surgical scars from previous surgeries present.   -CBC showed WBC of 10.6 with neutrophils of 9.4; electrolytes, BUN and creatinine were WNL.   -CT abdomen pelvis showed subtotal dilation of small bowel consistent with high-grade partial small bowel obstruction.    -Admitted inpatient for further management  -N.p.o.  -Continue IVF  -NG tube with suction for decompression  -serial abdominal exam  -Electrolyte replacement  -IV Dilaudid as needed for pain control  -IV Zofran PRN for nausea and vomiting  -Surgical consult to rule out any need for surgical intervention    Chronic narcotic dependence (HCC)- (present on  admission)  Assessment & Plan  -Per patient she has been on opiate medication for chronic abdominal pain for the past many years  -Currently on Roxicodone 10 mg 6 times a day and Dilaudid tab 2 mg twice daily at home      Neurogenic bladder-straight cath - (present on admission)  Assessment & Plan  -Patient has been having neurogenic bladder and doing straight cath 3 times a day since 1998  -Takes nitrofurantoin 50 mg for 1 to 2 days when urine looks dirty  -She took nitrofurantoin for 1 day 3 days ago

## 2020-02-03 NOTE — ED NOTES
Patient resting in bed. Remains on monitor. NG to low suction with appropriate output. Patient denies concerns, needs at this time. Call bell within reach. Aware of plan to be admitted.

## 2020-02-03 NOTE — ED NOTES
Report called by float nurse to floor while this RN was on break. Patient being taken up to room by transport now.

## 2020-02-03 NOTE — CONSULTS
General Surgical Consult  2/3/2020    Requesting  Physician: Isma Troncoso MD    Consulting Physician: Ronak Cuellar MD.     CC:  Abdominal pain, possible SBO    HPI: This is a 44 y.o female presents to [facility] Emergency Department with complaint of abdominal pain, nausea, and vomiting. She had sudden onset of pain in the right upper quadrant that is severe,crampy, and continuous.  The pain is on the right side of abdomen and does not radiate.  This event started at 9 PM yesterday associated with nausea. She had multiple episodes of nonbloody vomiting overnight with continuous abdominal pain.  The pain was similar to her 2 previous episodes of SBO.     She normally has 7-8 loose bowel movement in a day secondary to multiple surgeries but has not had a BM since yesterday.  She has not passed any gas.  Her last  SBO was in 2015 which did not require any surgical intervention - just NG decompression.     She denies any fever, chills, hematemesis, urinary symptoms.  She has been on opioids (oxycodone and Dilaudid) for the past many years for chronic abdominal pain; she has been following up regularly with pain specialist and with Dr. Lopez from GI consultant.       Past Medical History:   Diagnosis Date   • Bowel perforation ([facility])    • Heart murmur     grade 2, and grade 4   • Kidney infection    • Obstruction     reversed 3/2013   • Other specified disorder of intestines     chronic diarrhea   • Other specified symptom associated with female genital organs    • Pain 3/14/13    6/10 abdomen   • Pneumonia 1992   • Pneumothorax    • PTSD (post-traumatic stress disorder)    • SBO (small bowel obstruction) ([facility])    • Sepsis(995.91) 1997&1998   • Unspecified urinary incontinence    • Urethra disorder     self caths   • Urinary bladder disorder     self caths, freq uti, and kidney infections       Past Surgical History:   Procedure Laterality Date   • BARTHOLIN GLAND EXCISION  3/7/2014     Performed by Ana Boyle M.D. at SURGERY SAME DAY [facility] ORS   • EXPLORATORY LAPAROTOMY  3/18/2013    Performed by Shawn Gutierrez M.D. at SURGERY [facility] ORS   • LYSIS ADHESIONS GENERAL  3/18/2013    Performed by Shawn Gutierrez M.D. at SURGERY [facility] ORS   • COLON RESECTION  3/18/2013    Performed by Shawn Gutierrez M.D. at SURGERY [facility] ORS   • COLOSTOMY TAKEDOWN  3/18/2013    Performed by Shawn Gutierrez M.D. at SURGERY [facility] ORS   • RECOVERY  1/4/2013    Performed by [facility] at SURGERY SAME DAY [facility] ORS   • COLOSTOMY  6/11/12   • OTHER ABDOMINAL SURGERY  6/11/2012    COLOSTOMY AND PELVIC DRAIN   • OTHER  5/25/12    lapratomy with lysis of adhesions   • APPENDECTOMY     • CATH PLACE GROSHONG     • OTHER ABDOMINAL SURGERY      abdominalx11-BOWEL RESECTION   • OTHER ABDOMINAL SURGERY      PARTIAL SPLENECTOMY       Current Facility-Administered Medications   Medication Dose Route Frequency Provider Last Rate Last Dose   • NS infusion   Intravenous Continuous Hilton Mercado M.D. 100 mL/hr at 02/03/20 1410     • acetaminophen (TYLENOL) tablet 650 mg  650 mg Oral Q6HRS PRN Hilton Mercado M.D.       • ondansetron (ZOFRAN) syringe/vial injection 4 mg  4 mg Intravenous Q4HRS PRN Hilton Mercado M.D.       • [START ON 2/4/2020] enoxaparin (LOVENOX) inj 40 mg  40 mg Subcutaneous Daily-0800 Ronak Cuellar M.D.       • HYDROmorphone pf (DILAUDID) injection 1 mg  1 mg Intravenous Q4HRS PRN Hilton Mercado M.D.           Social History     Socioeconomic History   • Marital status: Single     Spouse name: Not on file   • Number of children: Not on file   • Years of education: Not on file   • Highest education level: Not on file   Occupational History   • Not on file   Social Needs   • Financial resource strain: Not on file   • Food insecurity:     Worry: Not on file     Inability: Not on file   • Transportation needs:     Medical: Not on file      Non-medical: Not on file   Tobacco Use   • Smoking status: Never Smoker   • Smokeless tobacco: Never Used   Substance and Sexual Activity   • Alcohol use: No   • Drug use: No   • Sexual activity: Not on file   Lifestyle   • Physical activity:     Days per week: Not on file     Minutes per session: Not on file   • Stress: Not on file   Relationships   • Social connections:     Talks on phone: Not on file     Gets together: Not on file     Attends Tenriism service: Not on file     Active member of club or organization: Not on file     Attends meetings of clubs or organizations: Not on file     Relationship status: Not on file   • Intimate partner violence:     Fear of current or ex partner: Not on file     Emotionally abused: Not on file     Physically abused: Not on file     Forced sexual activity: Not on file   Other Topics Concern   • Not on file   Social History Narrative   • Not on file       History reviewed. No pertinent family history.    Allergies:  Sammy-1 [lidocaine hcl] and Pcn [penicillins]    Review of Systems:  Constitutional: Negative for fever, chills, weight loss, malaise/fatigue and diaphoresis.   HENT: Negative for hearing loss, ear pain, nosebleeds, congestion, sore throat, neck pain, and ear discharge.    Eyes: Negative for blurred vision, double vision, and redness.   Respiratory: Negative for cough, sputum production, shortness of breath, wheezing and stridor.    Cardiovascular: Negative for chest pain, palpitations.   Gastrointestinal: Negative for heartburn, diarrhea, constipation. Positive for nausea, vomiting, and abdominal pain.  Genitourinary: Negative for dysuria, urgency, frequency.   Musculoskeletal: Negative for myalgias, back pain, joint pain and falls.   Skin: Negative for itching and rash.  Neurological: Negative for dizziness, loss of consciousness, weakness and headaches.   Endo/Heme/Allergies: Negative for environmental allergies. Does not bruise/bleed easily.  "  Psychiatric/Behavioral: Negative for depression and substance abuse. The patient is not nervous/anxious.    Physical Exam:  /84   Pulse 70   Temp 36.9 °C (98.5 °F) (Temporal)   Resp 17   Ht 1.651 m (5' 5\")   Wt 85.5 kg (188 lb 7.9 oz)   SpO2 95%     Constitutional: Awake, alert, oriented x3.  No acute distress.   Head: No cephalohematoma. Pupils 2-3 mm reactive bilaterally.  No malocclusion.    10 fr NG tube in place draining yellow fluid.  Neck: No tracheal deviation.  No JVD. No JVD.  FROM  Cardiovascular: Normal rate, regular rhythm, normal heart sounds and intact distal pulses.  Exam reveals no gallop and no friction rub.  No murmur heard.  Pulmonary/Chest:  There is no chest wall tenderness.  No crepitus. Positive breath sounds bilaterally. BS clear.  Abdominal: Soft, minimally distended. Mildly tender to palpation to the right of midline.  No guarding / rebound tenderness.  Musculoskeletal: Right upper extremity grossly atraumatic, palpable radial pulse. 5/5  strength. Full ROM and strength at elbow.  Left upper extremity grossly atraumatic, palpable radial pulse. 5/5  strength. Full ROM and strength at elbow.  Right lower extremity grossly atraumatic. 5/5 strength in ankle plantar flexion and dorsiflexion. No pain and full ROM at right knee and hip.   Left  lower extremity grossly atraumatic. 5/5 strength in ankle plantar flexion and dorsiflexion. No pain and full ROM at left knee and hip.   Back: Midline thoracic and lumbar spines are nontender to palpation.   : Normal female external genitalia. Rectal exam not done.   Neurological: Sensation intact to light touch dorsum and plantar surfaces of both feet and the medial and lateral aspects of both lower legs.  Sensation intact to light touch dorsum and plantar surfaces of both hands.   Skin: Skin is warm and dry.  No diaphoresis. No erythema. No pallor.     Labs:  Recent Labs     02/03/20  0715   WBC 10.6   RBC 5.08   HEMOGLOBIN 15.0 "   HEMATOCRIT 47.1*   MCV 92.7   MCH 29.5   MCHC 31.8*   RDW 43.2   PLATELETCT 215   MPV 10.3     Recent Labs     02/03/20  0715   SODIUM 137   POTASSIUM 4.0   CHLORIDE 100   CO2 29   GLUCOSE 131*   BUN 8   CREATININE 0.83   CALCIUM 9.7         Recent Labs     02/03/20  0715   ASTSGOT 28   ALTSGPT 15   TBILIRUBIN 0.5   ALKPHOSPHAT 55   GLOBULIN 3.7*       Radiology:  CT-ABDOMEN-PELVIS WITH   Final Result      1.  Subtotal dilatation of the small bowel with possible transition point in the right lower quadrant consistent with high-grade partial small bowel obstruction.      2.  Fluid and stool throughout the colon.      3.  Postoperative changes in the proximal and distal colon.      4.  No free air or abscess.            Assessment: This is a 44 y.o female with small bowel obstruction likely due to significant adhesions.     Plan:    NPO  NG for decompression  IV hydration  Electrolyte replacement    SBO (small bowel obstruction) (HCC)  NPO  NG to suction  IVF / electrolytes    Chronic narcotic dependence (HCC)  On Roxicodone 10 mg 6 times a day and Dilaudid tab 2 mg twice daily at home  Discuss with Pharmacy as needs appropriate IV narcotic repalcement    Neurogenic bladder-straight cath   Neurogenic bladder and doing straight cath 3 times a day for the past 15 years  Nitrofurantoin 50 mg for 1 to 2 days when urine looks dirty - last 1 day 3 days ago      Ronak Cuellar MD  [facility]  [phone]

## 2020-02-03 NOTE — ED TRIAGE NOTES
Brought in by medics from home. Patient has had RUQ abdominal pain, nausea, and vomiting since 2230 last night. Denies passing any gas or bowel movements since. Patient endorses multiple abdominal surgeries and hx of bowel obstruction. Abdomin appears distended. Patient has received 12.5mg IV Phenergan and 4mg IV Morphine. Continues to rate pain 10/10. Continues to feels nauseated.

## 2020-02-03 NOTE — ED PROVIDER NOTES
ED Provider Note    CHIEF COMPLAINT  Chief Complaint   Patient presents with   • Abdominal Pain       HPI  Corazon Berg is a 44 y.o. female who presents with abdominal distention, pain in the right upper quadrant onset 10 PM last night.  She has not passed gas or had bowel movement since.  Medical history significant for previous liver abscess, bowel injury during gynecologic surgery, requiring surgical repair and a subsequent problems with adhesions.  She has had colectomy surgery also.  She states her last bowel obstruction was relieved after NG tube only.  Previous to that has had surgery for it multiple times.  Pain is sharp, cramping.  She has had nausea and vomiting since onset.  Patient has chronic pain, normally takes oral Dilaudid.  She is requesting medication for pain.  No bloody emesis.  No chest pain or shortness of breath    REVIEW OF SYSTEMS  Constitutional: General malaise, no fever  Respiratory: No shortness of breath  Cardiac: No chest pain or syncope  Gastrointestinal: Abdominal pain, distention, vomiting  Musculoskeletal: No acute back pain  Neurologic: No headache  Genitourinary: Denies dysuria       All other systems are negative.     PAST MEDICAL HISTORY  Past Medical History:   Diagnosis Date   • Bowel perforation ([facility])    • Heart murmur     grade 2, and grade 4   • Kidney infection    • Obstruction     reversed 3/2013   • Other specified disorder of intestines     chronic diarrhea   • Other specified symptom associated with female genital organs    • Pain 3/14/13    6/10 abdomen   • Pneumonia 1992   • Pneumothorax    • PTSD (post-traumatic stress disorder)    • SBO (small bowel obstruction) ([facility])    • Sepsis(995.91) 1997&1998   • Unspecified urinary incontinence    • Urethra disorder     self caths   • Urinary bladder disorder     self caths, freq uti, and kidney infections       FAMILY HISTORY  History reviewed. No pertinent family history.    SOCIAL HISTORY  Social History      Socioeconomic History   • Marital status: Single     Spouse name: Not on file   • Number of children: Not on file   • Years of education: Not on file   • Highest education level: Not on file   Occupational History   • Not on file   Social Needs   • Financial resource strain: Not on file   • Food insecurity:     Worry: Not on file     Inability: Not on file   • Transportation needs:     Medical: Not on file     Non-medical: Not on file   Tobacco Use   • Smoking status: Never Smoker   • Smokeless tobacco: Never Used   Substance and Sexual Activity   • Alcohol use: No   • Drug use: No   • Sexual activity: Not on file   Lifestyle   • Physical activity:     Days per week: Not on file     Minutes per session: Not on file   • Stress: Not on file   Relationships   • Social connections:     Talks on phone: Not on file     Gets together: Not on file     Attends Buddhist service: Not on file     Active member of club or organization: Not on file     Attends meetings of clubs or organizations: Not on file     Relationship status: Not on file   • Intimate partner violence:     Fear of current or ex partner: Not on file     Emotionally abused: Not on file     Physically abused: Not on file     Forced sexual activity: Not on file   Other Topics Concern   • Not on file   Social History Narrative   • Not on file       SURGICAL HISTORY  Past Surgical History:   Procedure Laterality Date   • BARTHOLIN GLAND EXCISION  3/7/2014    Performed by Ana Boyle M.D. at SURGERY SAME DAY [facility] ORS   • EXPLORATORY LAPAROTOMY  3/18/2013    Performed by Shawn Gutierrez M.D. at SURGERY [facility] ORS   • LYSIS ADHESIONS GENERAL  3/18/2013    Performed by Shawn Gutierrez M.D. at SURGERY [facility] ORS   • COLON RESECTION  3/18/2013    Performed by Shawn Gutierrez M.D. at SURGERY [facility] ORS   • COLOSTOMY TAKEDOWN  3/18/2013    Performed by Shawn Gutierrez M.D. at SURGERY [facility] ORS   •  "RECOVERY  1/4/2013    Performed by [facility] at SURGERY SAME DAY [facility] ORS   • COLOSTOMY  6/11/12   • OTHER ABDOMINAL SURGERY  6/11/2012    COLOSTOMY AND PELVIC DRAIN   • OTHER  5/25/12    lapratomy with lysis of adhesions   • APPENDECTOMY     • CATH PLACE GROSHONG     • OTHER ABDOMINAL SURGERY      abdominalx11-BOWEL RESECTION   • OTHER ABDOMINAL SURGERY      PARTIAL SPLENECTOMY       CURRENT MEDICATIONS  Home Medications     Reviewed by Ilir Holt (Pharmacy Tech) on 02/03/20 at 0847  Med List Status: Complete   Medication Last Dose Status   Bacillus Coagulans-Inulin (PROBIOTIC FORMULA) 1-250 BILLION-MG Cap FEW DAYS AGO Active   clonazepam (KLONOPIN) 0.5 MG Tab 2/2/2020 Active   cyanocobalamin (VITAMIN B-12) 1000 MCG/ML SOLN UNK Active   HYDROmorphone (DILAUDID) 2 MG Tab 2/2/2020 Active   mirtazapine (REMERON) 15 MG Tab 2/1/2020 Active   NARCAN 4 MG/0.1ML Liquid PRN Active   nitrofurantoin (MACRODANTIN) 50 MG Cap FEW DAYS AGO Active   oxyCODONE immediate release (ROXICODONE) 10 MG immediate release tablet 2/2/2020 Active                ALLERGIES  Allergies   Allergen Reactions   • Sammy-1 [Lidocaine Hcl] Anaphylaxis     Topical or injectable, \"subq numbing anesthetics\"  \"Causes Methemoglobinemia.\"   • Pcn [Penicillins] Rash       PHYSICAL EXAM  VITAL SIGNS: /79   Pulse 72   Temp 37.1 °C (98.8 °F) (Temporal)   SpO2 96%   Constitutional: Well-nourished, no distress  ENT: Nares clear, mucous membranes somewhat dry.  Eyes:  Conjunctiva normal, No discharge.    Lymphatic: No adenopathy.   Cardiovascular: Normal heart rate, Normal rhythm.   Pulmonary: No wheezing, no rales  Gastrointestinal: Distended, tender right upper quadrant, no peritoneal signs.  Skin: Warm, Dry, No jaundice.   Musculoskeletal:  No CVA tenderness.   Neurologic:  Normal motor and sensory function, No focal deficits noted.   Psychiatric: At times anxious.  Currently calm and " cooperative    RADIOLOGY/PROCEDURES/Labs  Results for orders placed or performed during the hospital encounter of 02/03/20   CBC WITH DIFFERENTIAL   Result Value Ref Range    WBC 10.6 4.8 - 10.8 K/uL    RBC 5.08 4.20 - 5.40 M/uL    Hemoglobin 15.0 12.0 - 16.0 g/dL    Hematocrit 47.1 (H) 37.0 - 47.0 %    MCV 92.7 81.4 - 97.8 fL    MCH 29.5 27.0 - 33.0 pg    MCHC 31.8 (L) 33.6 - 35.0 g/dL    RDW 43.2 35.9 - 50.0 fL    Platelet Count 215 164 - 446 K/uL    MPV 10.3 9.0 - 12.9 fL    Neutrophils-Polys 84.90 (H) 44.00 - 72.00 %    Lymphocytes 8.10 (L) 22.00 - 41.00 %    Monocytes 5.60 0.00 - 13.40 %    Eosinophils 0.50 0.00 - 6.90 %    Basophils 0.40 0.00 - 1.80 %    Immature Granulocytes 0.50 0.00 - 0.90 %    Nucleated RBC 0.00 /100 WBC    Neutrophils (Absolute) 9.04 (H) 2.00 - 7.15 K/uL    Lymphs (Absolute) 0.86 (L) 1.00 - 4.80 K/uL    Monos (Absolute) 0.60 0.00 - 0.85 K/uL    Eos (Absolute) 0.05 0.00 - 0.51 K/uL    Baso (Absolute) 0.04 0.00 - 0.12 K/uL    Immature Granulocytes (abs) 0.05 0.00 - 0.11 K/uL    NRBC (Absolute) 0.00 K/uL   COMP METABOLIC PANEL   Result Value Ref Range    Sodium 137 135 - 145 mmol/L    Potassium 4.0 3.6 - 5.5 mmol/L    Chloride 100 96 - 112 mmol/L    Co2 29 20 - 33 mmol/L    Anion Gap 8.0 0.0 - 11.9    Glucose 131 (H) 65 - 99 mg/dL    Bun 8 8 - 22 mg/dL    Creatinine 0.83 0.50 - 1.40 mg/dL    Calcium 9.7 8.5 - 10.5 mg/dL    AST(SGOT) 28 12 - 45 U/L    ALT(SGPT) 15 2 - 50 U/L    Alkaline Phosphatase 55 30 - 99 U/L    Total Bilirubin 0.5 0.1 - 1.5 mg/dL    Albumin 4.3 3.2 - 4.9 g/dL    Total Protein 8.0 6.0 - 8.2 g/dL    Globulin 3.7 (H) 1.9 - 3.5 g/dL    A-G Ratio 1.2 g/dL   LIPASE   Result Value Ref Range    Lipase 22 11 - 82 U/L   URINALYSIS,CULTURE IF INDICATED   Result Value Ref Range    Color Yellow     Character Clear     Specific Gravity 1.017 <1.035    Ph 8.0 5.0 - 8.0    Glucose Negative Negative mg/dL    Ketones Negative Negative mg/dL    Protein Negative Negative mg/dL    Bilirubin  Negative Negative    Urobilinogen, Urine 0.2 Negative    Nitrite Negative Negative    Leukocyte Esterase Negative Negative    Occult Blood Negative Negative    Micro Urine Req see below    HCG QUAL SERUM   Result Value Ref Range    Beta-Hcg Qualitative Serum Negative Negative   ESTIMATED GFR   Result Value Ref Range    GFR If African American >60 >60 mL/min/1.73 m 2    GFR If Non African American >60 >60 mL/min/1.73 m 2   Magnesium   Result Value Ref Range    Magnesium 1.7 1.5 - 2.5 mg/dL     CT-ABDOMEN-PELVIS WITH   Final Result      1.  Subtotal dilatation of the small bowel with possible transition point in the right lower quadrant consistent with high-grade partial small bowel obstruction.      2.  Fluid and stool throughout the colon.      3.  Postoperative changes in the proximal and distal colon.      4.  No free air or abscess.            COURSE & MEDICAL DECISION MAKING  Pertinent Labs & Imaging studies reviewed. (See chart for details)  CT scan with contrast demonstrates small bowel obstruction with transition point right lower quadrant.  I spoke initially with her previous surgeon Dr. Verma who has not seen her for 5 years and was unavailable today.  I spoke with Dr. Cuellar on-call for general surgery who has agreed to consult on the patient.  Patient was admitted to the hospitalist for medical management as well.  In the ER she received 2 doses of IV Dilaudid, Zofran for nausea, IV fluids.  Urinalysis is negative, no evidence of sepsis at this time    Patient has requested and received NG tube, stating this helped her in the past.    FINAL IMPRESSION  1. Small bowel obstruction (HCC)     2. Intractable vomiting with nausea, unspecified vomiting type             Electronically signed by: Isma Troncoso M.D., 2/3/2020 10:10 AM

## 2020-02-03 NOTE — ED NOTES
Ambulated to restroom with steady gate. Straight Cath'ed self to give urine sample and empty bladder. Placed back on monitor. NG on low suction with appropriate output.

## 2020-02-04 ENCOUNTER — APPOINTMENT (OUTPATIENT)
Dept: RADIOLOGY | Facility: MEDICAL CENTER | Age: 45
DRG: 389 | End: 2020-02-04
Attending: SURGERY
Payer: MEDICAID

## 2020-02-04 LAB
ALBUMIN SERPL BCP-MCNC: 3.6 G/DL (ref 3.2–4.9)
ALBUMIN/GLOB SERPL: 1.3 G/DL
ALP SERPL-CCNC: 45 U/L (ref 30–99)
ALT SERPL-CCNC: 9 U/L (ref 2–50)
ANION GAP SERPL CALC-SCNC: 5 MMOL/L (ref 0–11.9)
AST SERPL-CCNC: 16 U/L (ref 12–45)
BASOPHILS # BLD AUTO: 0.8 % (ref 0–1.8)
BASOPHILS # BLD: 0.04 K/UL (ref 0–0.12)
BILIRUB SERPL-MCNC: 0.8 MG/DL (ref 0.1–1.5)
BUN SERPL-MCNC: 9 MG/DL (ref 8–22)
CALCIUM SERPL-MCNC: 8.4 MG/DL (ref 8.5–10.5)
CHLORIDE SERPL-SCNC: 106 MMOL/L (ref 96–112)
CO2 SERPL-SCNC: 26 MMOL/L (ref 20–33)
CREAT SERPL-MCNC: 0.68 MG/DL (ref 0.5–1.4)
EOSINOPHIL # BLD AUTO: 0.16 K/UL (ref 0–0.51)
EOSINOPHIL NFR BLD: 3 % (ref 0–6.9)
ERYTHROCYTE [DISTWIDTH] IN BLOOD BY AUTOMATED COUNT: 43.8 FL (ref 35.9–50)
GLOBULIN SER CALC-MCNC: 2.7 G/DL (ref 1.9–3.5)
GLUCOSE SERPL-MCNC: 99 MG/DL (ref 65–99)
HCT VFR BLD AUTO: 41.7 % (ref 37–47)
HGB BLD-MCNC: 13.3 G/DL (ref 12–16)
IMM GRANULOCYTES # BLD AUTO: 0.01 K/UL (ref 0–0.11)
IMM GRANULOCYTES NFR BLD AUTO: 0.2 % (ref 0–0.9)
LYMPHOCYTES # BLD AUTO: 1.63 K/UL (ref 1–4.8)
LYMPHOCYTES NFR BLD: 31 % (ref 22–41)
MAGNESIUM SERPL-MCNC: 1.9 MG/DL (ref 1.5–2.5)
MCH RBC QN AUTO: 29.4 PG (ref 27–33)
MCHC RBC AUTO-ENTMCNC: 31.9 G/DL (ref 33.6–35)
MCV RBC AUTO: 92.3 FL (ref 81.4–97.8)
MONOCYTES # BLD AUTO: 0.45 K/UL (ref 0–0.85)
MONOCYTES NFR BLD AUTO: 8.6 % (ref 0–13.4)
NEUTROPHILS # BLD AUTO: 2.97 K/UL (ref 2–7.15)
NEUTROPHILS NFR BLD: 56.4 % (ref 44–72)
NRBC # BLD AUTO: 0 K/UL
NRBC BLD-RTO: 0 /100 WBC
PHOSPHATE SERPL-MCNC: 4.3 MG/DL (ref 2.5–4.5)
PLATELET # BLD AUTO: 181 K/UL (ref 164–446)
PMV BLD AUTO: 10.2 FL (ref 9–12.9)
POTASSIUM SERPL-SCNC: 3.7 MMOL/L (ref 3.6–5.5)
PROT SERPL-MCNC: 6.3 G/DL (ref 6–8.2)
RBC # BLD AUTO: 4.52 M/UL (ref 4.2–5.4)
SODIUM SERPL-SCNC: 137 MMOL/L (ref 135–145)
WBC # BLD AUTO: 5.3 K/UL (ref 4.8–10.8)

## 2020-02-04 PROCEDURE — 83735 ASSAY OF MAGNESIUM: CPT

## 2020-02-04 PROCEDURE — 74018 RADEX ABDOMEN 1 VIEW: CPT

## 2020-02-04 PROCEDURE — 700111 HCHG RX REV CODE 636 W/ 250 OVERRIDE (IP): Performed by: STUDENT IN AN ORGANIZED HEALTH CARE EDUCATION/TRAINING PROGRAM

## 2020-02-04 PROCEDURE — 84100 ASSAY OF PHOSPHORUS: CPT

## 2020-02-04 PROCEDURE — 99232 SBSQ HOSP IP/OBS MODERATE 35: CPT | Mod: GC | Performed by: INTERNAL MEDICINE

## 2020-02-04 PROCEDURE — 770006 HCHG ROOM/CARE - MED/SURG/GYN SEMI*

## 2020-02-04 PROCEDURE — 74240 X-RAY XM UPR GI TRC 1CNTRST: CPT

## 2020-02-04 PROCEDURE — A9270 NON-COVERED ITEM OR SERVICE: HCPCS | Performed by: STUDENT IN AN ORGANIZED HEALTH CARE EDUCATION/TRAINING PROGRAM

## 2020-02-04 PROCEDURE — 700111 HCHG RX REV CODE 636 W/ 250 OVERRIDE (IP): Performed by: SURGERY

## 2020-02-04 PROCEDURE — 700117 HCHG RX CONTRAST REV CODE 255: Performed by: SURGERY

## 2020-02-04 PROCEDURE — 700105 HCHG RX REV CODE 258: Performed by: STUDENT IN AN ORGANIZED HEALTH CARE EDUCATION/TRAINING PROGRAM

## 2020-02-04 PROCEDURE — 700102 HCHG RX REV CODE 250 W/ 637 OVERRIDE(OP): Performed by: STUDENT IN AN ORGANIZED HEALTH CARE EDUCATION/TRAINING PROGRAM

## 2020-02-04 PROCEDURE — 85025 COMPLETE CBC W/AUTO DIFF WBC: CPT

## 2020-02-04 PROCEDURE — 700101 HCHG RX REV CODE 250: Performed by: STUDENT IN AN ORGANIZED HEALTH CARE EDUCATION/TRAINING PROGRAM

## 2020-02-04 PROCEDURE — 36415 COLL VENOUS BLD VENIPUNCTURE: CPT

## 2020-02-04 PROCEDURE — 700105 HCHG RX REV CODE 258

## 2020-02-04 PROCEDURE — 80053 COMPREHEN METABOLIC PANEL: CPT

## 2020-02-04 RX ORDER — SODIUM CHLORIDE 9 MG/ML
INJECTION, SOLUTION INTRAVENOUS
Status: COMPLETED
Start: 2020-02-04 | End: 2020-02-04

## 2020-02-04 RX ORDER — KETOROLAC TROMETHAMINE 30 MG/ML
15 INJECTION, SOLUTION INTRAMUSCULAR; INTRAVENOUS EVERY 6 HOURS PRN
Status: DISCONTINUED | OUTPATIENT
Start: 2020-02-04 | End: 2020-02-06 | Stop reason: HOSPADM

## 2020-02-04 RX ORDER — POTASSIUM CHLORIDE 7.45 MG/ML
10 INJECTION INTRAVENOUS
Status: COMPLETED | OUTPATIENT
Start: 2020-02-04 | End: 2020-02-04

## 2020-02-04 RX ORDER — HYDROMORPHONE HYDROCHLORIDE 1 MG/ML
1 INJECTION, SOLUTION INTRAMUSCULAR; INTRAVENOUS; SUBCUTANEOUS
Status: DISCONTINUED | OUTPATIENT
Start: 2020-02-04 | End: 2020-02-06 | Stop reason: HOSPADM

## 2020-02-04 RX ORDER — SODIUM CHLORIDE AND POTASSIUM CHLORIDE 150; 450 MG/100ML; MG/100ML
INJECTION, SOLUTION INTRAVENOUS CONTINUOUS
Status: DISCONTINUED | OUTPATIENT
Start: 2020-02-04 | End: 2020-02-06 | Stop reason: HOSPADM

## 2020-02-04 RX ORDER — PROCHLORPERAZINE EDISYLATE 5 MG/ML
10 INJECTION INTRAMUSCULAR; INTRAVENOUS EVERY 6 HOURS PRN
Status: DISCONTINUED | OUTPATIENT
Start: 2020-02-04 | End: 2020-02-06 | Stop reason: HOSPADM

## 2020-02-04 RX ADMIN — HYDROMORPHONE HYDROCHLORIDE 1 MG: 1 INJECTION, SOLUTION INTRAMUSCULAR; INTRAVENOUS; SUBCUTANEOUS at 22:36

## 2020-02-04 RX ADMIN — IOHEXOL 200 ML: 350 INJECTION, SOLUTION INTRAVENOUS at 13:15

## 2020-02-04 RX ADMIN — PROCHLORPERAZINE EDISYLATE 10 MG: 5 INJECTION INTRAMUSCULAR; INTRAVENOUS at 17:01

## 2020-02-04 RX ADMIN — HYDROMORPHONE HYDROCHLORIDE 1 MG: 1 INJECTION, SOLUTION INTRAMUSCULAR; INTRAVENOUS; SUBCUTANEOUS at 14:08

## 2020-02-04 RX ADMIN — SODIUM CHLORIDE: 9 INJECTION, SOLUTION INTRAVENOUS at 01:52

## 2020-02-04 RX ADMIN — HYDROMORPHONE HYDROCHLORIDE 1 MG: 1 INJECTION, SOLUTION INTRAMUSCULAR; INTRAVENOUS; SUBCUTANEOUS at 01:51

## 2020-02-04 RX ADMIN — HYDROMORPHONE HYDROCHLORIDE 1 MG: 1 INJECTION, SOLUTION INTRAMUSCULAR; INTRAVENOUS; SUBCUTANEOUS at 06:09

## 2020-02-04 RX ADMIN — POTASSIUM CHLORIDE 10 MEQ: 10 INJECTION, SOLUTION INTRAVENOUS at 08:00

## 2020-02-04 RX ADMIN — POTASSIUM CHLORIDE 10 MEQ: 10 INJECTION, SOLUTION INTRAVENOUS at 10:14

## 2020-02-04 RX ADMIN — POTASSIUM CHLORIDE AND SODIUM CHLORIDE: 450; 150 INJECTION, SOLUTION INTRAVENOUS at 10:14

## 2020-02-04 RX ADMIN — ONDANSETRON 4 MG: 2 INJECTION INTRAMUSCULAR; INTRAVENOUS at 11:21

## 2020-02-04 RX ADMIN — CLONAZEPAM 0.5 MG: 0.5 TABLET ORAL at 05:20

## 2020-02-04 RX ADMIN — SODIUM CHLORIDE 200 ML: 9 INJECTION, SOLUTION INTRAVENOUS at 16:57

## 2020-02-04 RX ADMIN — KETOROLAC TROMETHAMINE 15 MG: 30 INJECTION, SOLUTION INTRAMUSCULAR at 12:16

## 2020-02-04 RX ADMIN — POTASSIUM CHLORIDE 10 MEQ: 10 INJECTION, SOLUTION INTRAVENOUS at 16:54

## 2020-02-04 RX ADMIN — HYDROMORPHONE HYDROCHLORIDE 1 MG: 1 INJECTION, SOLUTION INTRAMUSCULAR; INTRAVENOUS; SUBCUTANEOUS at 10:11

## 2020-02-04 RX ADMIN — ENOXAPARIN SODIUM 40 MG: 100 INJECTION SUBCUTANEOUS at 11:07

## 2020-02-04 RX ADMIN — HYDROMORPHONE HYDROCHLORIDE 1 MG: 1 INJECTION, SOLUTION INTRAMUSCULAR; INTRAVENOUS; SUBCUTANEOUS at 20:33

## 2020-02-04 RX ADMIN — CLONAZEPAM 0.5 MG: 0.5 TABLET ORAL at 20:34

## 2020-02-04 RX ADMIN — HYDROMORPHONE HYDROCHLORIDE 1 MG: 1 INJECTION, SOLUTION INTRAMUSCULAR; INTRAVENOUS; SUBCUTANEOUS at 17:01

## 2020-02-04 ASSESSMENT — ENCOUNTER SYMPTOMS
NAUSEA: 0
DEPRESSION: 0
ABDOMINAL PAIN: 1
BLURRED VISION: 0
LOSS OF CONSCIOUSNESS: 0
BLOOD IN STOOL: 0
PALPITATIONS: 0
HEARTBURN: 0
MYALGIAS: 0
DIARRHEA: 0
VOMITING: 0
SHORTNESS OF BREATH: 0
NAUSEA: 1
WEIGHT LOSS: 0
PHOTOPHOBIA: 0
ORTHOPNEA: 0
FEVER: 0
HEADACHES: 0
SORE THROAT: 0
SPEECH CHANGE: 0
CHILLS: 0
DOUBLE VISION: 0
DIZZINESS: 0
SENSORY CHANGE: 0
FOCAL WEAKNESS: 0
COUGH: 0
INSOMNIA: 0
HEMOPTYSIS: 0
MEMORY LOSS: 0
WHEEZING: 0
SEIZURES: 0
SPUTUM PRODUCTION: 0

## 2020-02-04 NOTE — ASSESSMENT & PLAN NOTE
-Patient has been having neurogenic bladder and doing straight cath 3 times a day since 1998  -Takes nitrofurantoin 50 mg for 1 to 2 days when urine looks dirty  -She took nitrofurantoin for 1 day 3 days ago

## 2020-02-04 NOTE — DISCHARGE PLANNING
Patient is eligible for Medicaid Meds to Beds at discharge if they have coverage with [place] [employer], Medicaid FFS, Medicaid HMO ([facility]), or [employer]. This service is provided through the [facility] if orders are received by the pharmacy prior to 4pm Monday through Friday excluding holidays. Preferred pharmacy has been changed to [facility]. Please call [phone] prior to discharge.

## 2020-02-04 NOTE — ASSESSMENT & PLAN NOTE
-Per patient she has been on opiate medication for chronic abdominal pain for the past many years  -Currently on Roxicodone 10 mg 6 times a day and Dilaudid tab 2 mg twice daily at home

## 2020-02-04 NOTE — ASSESSMENT & PLAN NOTE
-Patient presented with sudden onset, crampy, 10/10, abdominal pain with nausea and vomiting from yesterday night; CBC showed WBC of 10.6 with neutrophils of 9.4; electrolytes, BUN and creatinine were WNL; CT abdomen pelvis showed subtotal dilation of small bowel consistent with high-grade partial small bowel obstruction.    -Admitted inpatient for further management  -VS are stable; no leukocytosis or electrolyte abnormalities  -Electrolyte replacement  -IV Zofran PRN for nausea and vomiting  -On IV dilaudid 1 mg Q 2hrs PRN; will add IV toradol 15 mg Q 6 hrs PRN  -oral contrast filled colon in <3 hrs yesterday indicating no obstruction  -General surgery recommended medical management; ordered milk and molasses enema; appreciate  recommendations  -NG clamping and trial of clear liquid today

## 2020-02-04 NOTE — CARE PLAN
Problem: Communication  Goal: The ability to communicate needs accurately and effectively will improve  Note:   Pt able to communicate needs.      Problem: Safety  Goal: Will remain free from injury  Note:   Call light and belongings within reach, bed down and locked, hourly rounding in progress. Treaded socks in use, no DME at bedside. Bed alarm in use, pt calls appropriately.       Problem: Bowel/Gastric:  Goal: Normal bowel function is maintained or improved  Note:   Pt stated she has passed a small amount of gas        Problem: Pain Management  Goal: Pain level will decrease to patient's comfort goal  Note:   PRN pain medications in use for pain control.       Problem: Fluid Volume:  Goal: Will maintain balanced intake and output  Note:   IV fluids running, pt straight caths, NG to suction.

## 2020-02-04 NOTE — ASSESSMENT & PLAN NOTE
2/3 Subtotal dilatation of the small bowel with possible transition point in the right lower quadrant consistent with high-grade partial small bowel obstruction.  NG to LCWS  2/4 oral contrast filling colon in ~ 3 hr  2/5 NGT clamped, enema with multiple formed BMs, clear liquid diet  2/6 KUB with improvement in gas pattern and marked decrease stool / regular diet

## 2020-02-04 NOTE — PROGRESS NOTES
Daily Progress Note:     Date of Service: 2/4/2020  Primary Team: [facility] Green Team   Attending: Ben Rhodes M.D.   Senior Resident: Dr. Stacy  Intern: Dr. Mercado  Contact:  [phone]    Chief Complaint:   Abdominal pain, nausea/vomiting    Subjective:  -No acute events overnight; patient was afebrile  -She c/o pain in the abdomen mainly around umbilicus; decreased in intensity compared to yesterday; still nauseous but denied any vomiting  -On exam, abdomen was tender all over, mainly over right side and arturo-umbilical region with guarding; BS present  -did not have any BM since admission but passing gas  -NG tube in place; 950cc suction since admission   -VS are stable; no leukocytosis or electrolyte abnormalities  -On IV dilaudid 1 mg Q 4hrs PRN; will add IV toradol 15 mg Q 6 hrs PRN  -will continue NG tube with suction, IVF and NPO      Consultants/Specialty:  General Surgery    Review of Systems:      Review of Systems   Constitutional: Negative for chills, fever, malaise/fatigue and weight loss.   HENT: Negative for congestion, ear discharge, nosebleeds and sore throat.    Eyes: Negative for blurred vision, double vision and photophobia.   Respiratory: Negative for cough, hemoptysis, sputum production, shortness of breath and wheezing.    Cardiovascular: Negative for chest pain, palpitations, orthopnea and leg swelling.   Gastrointestinal: Positive for abdominal pain and nausea. Negative for blood in stool, diarrhea, heartburn and vomiting.   Genitourinary: Negative for hematuria.   Musculoskeletal: Negative for joint pain and myalgias.   Skin: Negative for itching and rash.   Neurological: Negative for dizziness, sensory change, speech change, focal weakness, seizures, loss of consciousness and headaches.   Psychiatric/Behavioral: Negative for depression and memory loss. The patient does not have insomnia.        Objective Data:   Physical Exam:   Vitals:   Temp:  [36.7 °C (98.1 °F)-37.7 °C (99.9 °F)]  36.8 °C (98.3 °F)  Pulse:  [64-76] 76  Resp:  [15-17] 17  BP: (130-166)/() 166/103  SpO2:  [93 %-96 %] 96 %     Physical Exam  Constitutional:       General: She is not in acute distress.     Appearance: Normal appearance. She is not ill-appearing, toxic-appearing or diaphoretic.   HENT:      Head: Normocephalic and atraumatic.      Nose: Nose normal. No congestion or rhinorrhea.      Mouth/Throat:      Mouth: Mucous membranes are wet.     Pharynx: No oropharyngeal exudate or posterior oropharyngeal erythema.   Eyes:      Extraocular Movements: Extraocular movements intact.      Pupils: Pupils are equal, round, and reactive to light.   Neck:      Musculoskeletal: Normal range of motion and neck supple. No neck rigidity or muscular tenderness.   Cardiovascular:      Rate and Rhythm: Normal rate and regular rhythm.      Heart sounds: No murmur. No friction rub. No gallop.    Pulmonary:      Effort: Pulmonary effort is normal. No respiratory distress.      Breath sounds: Normal breath sounds. No wheezing, rhonchi or rales.   Abdominal:      General: There is mild distension.      Palpations: There is no mass.      Tenderness: There is tenderness and guarding. There is no guarding or rebound.      Hernia: No hernia is present.      Comments: moderately tender abdomen with multiple visible scars from previous surgeries   Musculoskeletal: Normal range of motion.         General: No swelling or tenderness.   Skin:     General: Skin is dry.      Coloration: Skin is not jaundiced or pale.      Findings: No bruising or erythema.   Neurological:      General: No focal deficit present.      Mental Status: She is alert and oriented to person, place, and time.   Psychiatric:         Mood and Affect: Mood normal.         Behavior: Behavior normal.       Labs:     Recent Labs     02/03/20  0715 02/04/20  0504   WBC 10.6 5.3   RBC 5.08 4.52   HEMOGLOBIN 15.0 13.3   HEMATOCRIT 47.1* 41.7   MCV 92.7 92.3   MCH 29.5 29.4   RDW  43.2 43.8   PLATELETCT 215 181   MPV 10.3 10.2   NEUTSPOLYS 84.90* 56.40   LYMPHOCYTES 8.10* 31.00   MONOCYTES 5.60 8.60   EOSINOPHILS 0.50 3.00   BASOPHILS 0.40 0.80     Recent Labs     02/03/20  0715 02/04/20  0504   SODIUM 137 137   POTASSIUM 4.0 3.7   CHLORIDE 100 106   CO2 29 26   GLUCOSE 131* 99   BUN 8 9       Imaging:     DX-UPPER GI-SERIES WITH KUB   Final Result      No abnormalities are noted on upper GI series.      CT-ABDOMEN-PELVIS WITH   Final Result      1.  Subtotal dilatation of the small bowel with possible transition point in the right lower quadrant consistent with high-grade partial small bowel obstruction.      2.  Fluid and stool throughout the colon.      3.  Postoperative changes in the proximal and distal colon.      4.  No free air or abscess.          Assessment and Plan:     * SBO (small bowel obstruction) ([facility])- (present on admission)  Assessment & Plan  -Patient presented with sudden onset, crampy, 10/10, abdominal pain with nausea and vomiting from yesterday night; CBC showed WBC of 10.6 with neutrophils of 9.4; electrolytes, BUN and creatinine were WNL; CT abdomen pelvis showed subtotal dilation of small bowel consistent with high-grade partial small bowel obstruction.    -Admitted inpatient for further management  -NG tube in place; 950cc suction since admission   -VS are stable; no leukocytosis or electrolyte abnormalities  -On IV dilaudid 1 mg Q 4hrs PRN; will add IV toradol 15 mg Q 6 hrs PRN  -will continue NG tube with suction, IVF and NPO-serial abdominal exam  -Electrolyte replacement  -IV Zofran PRN for nausea and vomiting  -General Surgical consulted; recommended medical management for now; appreciated recommandeations    Chronic narcotic dependence (HCC)- (present on admission)  Assessment & Plan  -Per patient she has been on opiate medication for chronic abdominal pain for the past many years  -Currently on Roxicodone 10 mg 6 times a day and Dilaudid tab 2 mg twice daily  at home      Neurogenic bladder-straight cath - (present on admission)  Assessment & Plan  -Patient has been having neurogenic bladder and doing straight cath 3 times a day since 1998  -Takes nitrofurantoin 50 mg for 1 to 2 days when urine looks dirty  -She took nitrofurantoin for 1 day 3 days ago

## 2020-02-04 NOTE — PROGRESS NOTES
Trauma / Surgical Daily Progress Note    Date of Service  2020    Chief Complaint  44 y.o. female admitted 2/3/2020 with Small bowel obstruction (HCC)    Interval Events    Admission for recurrent SBO  Multiple previous abdominal surgeries  NGT, decompressing stomach  IVF, NPO  Small bowel series    Review of Systems  Review of Systems   Constitutional: Negative for fever.   Respiratory: Negative for shortness of breath.    Gastrointestinal: Positive for abdominal pain. Negative for nausea.        Vital Signs  Temp:  [36.7 °C (98.1 °F)-37.7 °C (99.9 °F)] 36.7 °C (98.1 °F)  Pulse:  [64-71] 64  Resp:  [15-17] 16  BP: (130-146)/(80-95) 132/80  SpO2:  [93 %-96 %] 93 %    Physical Exam  Physical Exam  HENT:      Nose:      Comments: NG in place  Cardiovascular:      Rate and Rhythm: Normal rate.   Pulmonary:      Effort: Pulmonary effort is normal.   Abdominal:      General: There is distension.      Tenderness: There is tenderness. There is guarding.   Musculoskeletal: Normal range of motion.   Skin:     General: Skin is warm.   Neurological:      General: No focal deficit present.      Mental Status: She is alert.         Laboratory  Recent Results (from the past 24 hour(s))   EKG    Collection Time: 20  5:50 PM   Result Value Ref Range    Report       [facility]    Test Date:  2020  Pt Name:    SHERI CASEY                 Department: 131  MRN:        [id number]                      Room:       07  Gender:     Female                       Technician: DAQUAN  :        1975                   Requested By:SHON BROOKS  Order #:    [id number]                    Reading MD: Antonio Peace MD    Measurements  Intervals                                Axis  Rate:       68                           P:          52  WI:         168                          QRS:        27  QRSD:       86                           T:          15  QT:         392  QTc:        417    Interpretive Statements  SINUS  RHYTHM  No previous ECG available for comparison  Electronically Signed On 2-3-2020 19:27:37 PST by Antonio Peace MD     Comp Metabolic Panel (CMP)    Collection Time: 02/04/20  5:04 AM   Result Value Ref Range    Sodium 137 135 - 145 mmol/L    Potassium 3.7 3.6 - 5.5 mmol/L    Chloride 106 96 - 112 mmol/L    Co2 26 20 - 33 mmol/L    Anion Gap 5.0 0.0 - 11.9    Glucose 99 65 - 99 mg/dL    Bun 9 8 - 22 mg/dL    Creatinine 0.68 0.50 - 1.40 mg/dL    Calcium 8.4 (L) 8.5 - 10.5 mg/dL    AST(SGOT) 16 12 - 45 U/L    ALT(SGPT) 9 2 - 50 U/L    Alkaline Phosphatase 45 30 - 99 U/L    Total Bilirubin 0.8 0.1 - 1.5 mg/dL    Albumin 3.6 3.2 - 4.9 g/dL    Total Protein 6.3 6.0 - 8.2 g/dL    Globulin 2.7 1.9 - 3.5 g/dL    A-G Ratio 1.3 g/dL   PHOSPHORUS    Collection Time: 02/04/20  5:04 AM   Result Value Ref Range    Phosphorus 4.3 2.5 - 4.5 mg/dL   CBC with Differential    Collection Time: 02/04/20  5:04 AM   Result Value Ref Range    WBC 5.3 4.8 - 10.8 K/uL    RBC 4.52 4.20 - 5.40 M/uL    Hemoglobin 13.3 12.0 - 16.0 g/dL    Hematocrit 41.7 37.0 - 47.0 %    MCV 92.3 81.4 - 97.8 fL    MCH 29.4 27.0 - 33.0 pg    MCHC 31.9 (L) 33.6 - 35.0 g/dL    RDW 43.8 35.9 - 50.0 fL    Platelet Count 181 164 - 446 K/uL    MPV 10.2 9.0 - 12.9 fL    Neutrophils-Polys 56.40 44.00 - 72.00 %    Lymphocytes 31.00 22.00 - 41.00 %    Monocytes 8.60 0.00 - 13.40 %    Eosinophils 3.00 0.00 - 6.90 %    Basophils 0.80 0.00 - 1.80 %    Immature Granulocytes 0.20 0.00 - 0.90 %    Nucleated RBC 0.00 /100 WBC    Neutrophils (Absolute) 2.97 2.00 - 7.15 K/uL    Lymphs (Absolute) 1.63 1.00 - 4.80 K/uL    Monos (Absolute) 0.45 0.00 - 0.85 K/uL    Eos (Absolute) 0.16 0.00 - 0.51 K/uL    Baso (Absolute) 0.04 0.00 - 0.12 K/uL    Immature Granulocytes (abs) 0.01 0.00 - 0.11 K/uL    NRBC (Absolute) 0.00 K/uL   ESTIMATED GFR    Collection Time: 02/04/20  5:04 AM   Result Value Ref Range    GFR If African American >60 >60 mL/min/1.73 m 2    GFR If Non African  American >60 >60 mL/min/1.73 m 2       Fluids    Intake/Output Summary (Last 24 hours) at 2/4/2020 1347  Last data filed at 2/4/2020 0800  Gross per 24 hour   Intake 83.33 ml   Output 950 ml   Net -866.67 ml       Core Measures & Quality Metrics  Labs reviewed and Medications reviewed  Ramirez catheter: No Ramirez      DVT Prophylaxis: Enoxaparin (Lovenox)    Ulcer prophylaxis: Yes    Assessed for rehab: Patient returned to prior level of function, rehabilitation not indicated at this time    FLAVIO Score  ETOH Screening    Assessment/Plan  * SBO (small bowel obstruction) (HCC)- (present on admission)  Assessment & Plan  2/3 Subtotal dilatation of the small bowel with possible transition point in the right lower quadrant consistent with high-grade partial small bowel obstruction.  NG to LCWS    Neurogenic bladder-straight cath - (present on admission)  Assessment & Plan  Self caths        Discussed patient condition with RN, Patient and trauma surgery. Dr. GERALDO Cuellar.    Patient seen, data reviewed and discussed.  Agree with assessment and plan.  RONALDO

## 2020-02-04 NOTE — PROGRESS NOTES
"/80   Pulse 64   Temp 36.7 °C (98.1 °F) (Temporal)   Resp 16   Ht 1.651 m (5' 5\")   Wt 85.5 kg (188 lb 7.9 oz)   SpO2 93%     New admission for SBO  NG in place, 950cc output since placement  Remains tender on right, difficult to exam  WBC trending down    "

## 2020-02-05 ENCOUNTER — APPOINTMENT (OUTPATIENT)
Dept: RADIOLOGY | Facility: MEDICAL CENTER | Age: 45
DRG: 389 | End: 2020-02-05
Attending: STUDENT IN AN ORGANIZED HEALTH CARE EDUCATION/TRAINING PROGRAM
Payer: MEDICAID

## 2020-02-05 LAB
ANION GAP SERPL CALC-SCNC: 10 MMOL/L (ref 0–11.9)
BASOPHILS # BLD AUTO: 0.5 % (ref 0–1.8)
BASOPHILS # BLD: 0.03 K/UL (ref 0–0.12)
BUN SERPL-MCNC: 12 MG/DL (ref 8–22)
CALCIUM SERPL-MCNC: 8.8 MG/DL (ref 8.5–10.5)
CHLORIDE SERPL-SCNC: 105 MMOL/L (ref 96–112)
CO2 SERPL-SCNC: 23 MMOL/L (ref 20–33)
CREAT SERPL-MCNC: 0.63 MG/DL (ref 0.5–1.4)
EOSINOPHIL # BLD AUTO: 0.04 K/UL (ref 0–0.51)
EOSINOPHIL NFR BLD: 0.7 % (ref 0–6.9)
ERYTHROCYTE [DISTWIDTH] IN BLOOD BY AUTOMATED COUNT: 43.5 FL (ref 35.9–50)
GLUCOSE SERPL-MCNC: 76 MG/DL (ref 65–99)
HCT VFR BLD AUTO: 40.9 % (ref 37–47)
HGB BLD-MCNC: 13.2 G/DL (ref 12–16)
IMM GRANULOCYTES # BLD AUTO: 0.01 K/UL (ref 0–0.11)
IMM GRANULOCYTES NFR BLD AUTO: 0.2 % (ref 0–0.9)
LYMPHOCYTES # BLD AUTO: 1.6 K/UL (ref 1–4.8)
LYMPHOCYTES NFR BLD: 29 % (ref 22–41)
MAGNESIUM SERPL-MCNC: 1.9 MG/DL (ref 1.5–2.5)
MCH RBC QN AUTO: 29.9 PG (ref 27–33)
MCHC RBC AUTO-ENTMCNC: 32.3 G/DL (ref 33.6–35)
MCV RBC AUTO: 92.7 FL (ref 81.4–97.8)
MONOCYTES # BLD AUTO: 0.45 K/UL (ref 0–0.85)
MONOCYTES NFR BLD AUTO: 8.2 % (ref 0–13.4)
NEUTROPHILS # BLD AUTO: 3.38 K/UL (ref 2–7.15)
NEUTROPHILS NFR BLD: 61.4 % (ref 44–72)
NRBC # BLD AUTO: 0 K/UL
NRBC BLD-RTO: 0 /100 WBC
PLATELET # BLD AUTO: 188 K/UL (ref 164–446)
PMV BLD AUTO: 10.2 FL (ref 9–12.9)
POTASSIUM SERPL-SCNC: 4.1 MMOL/L (ref 3.6–5.5)
RBC # BLD AUTO: 4.41 M/UL (ref 4.2–5.4)
SODIUM SERPL-SCNC: 138 MMOL/L (ref 135–145)
WBC # BLD AUTO: 5.5 K/UL (ref 4.8–10.8)

## 2020-02-05 PROCEDURE — 700102 HCHG RX REV CODE 250 W/ 637 OVERRIDE(OP): Performed by: STUDENT IN AN ORGANIZED HEALTH CARE EDUCATION/TRAINING PROGRAM

## 2020-02-05 PROCEDURE — 83735 ASSAY OF MAGNESIUM: CPT

## 2020-02-05 PROCEDURE — 700111 HCHG RX REV CODE 636 W/ 250 OVERRIDE (IP): Performed by: SURGERY

## 2020-02-05 PROCEDURE — 770006 HCHG ROOM/CARE - MED/SURG/GYN SEMI*

## 2020-02-05 PROCEDURE — 700111 HCHG RX REV CODE 636 W/ 250 OVERRIDE (IP): Performed by: STUDENT IN AN ORGANIZED HEALTH CARE EDUCATION/TRAINING PROGRAM

## 2020-02-05 PROCEDURE — A9270 NON-COVERED ITEM OR SERVICE: HCPCS | Performed by: STUDENT IN AN ORGANIZED HEALTH CARE EDUCATION/TRAINING PROGRAM

## 2020-02-05 PROCEDURE — 80048 BASIC METABOLIC PNL TOTAL CA: CPT

## 2020-02-05 PROCEDURE — 85025 COMPLETE CBC W/AUTO DIFF WBC: CPT

## 2020-02-05 PROCEDURE — 700101 HCHG RX REV CODE 250: Performed by: STUDENT IN AN ORGANIZED HEALTH CARE EDUCATION/TRAINING PROGRAM

## 2020-02-05 PROCEDURE — 99232 SBSQ HOSP IP/OBS MODERATE 35: CPT | Mod: GC | Performed by: INTERNAL MEDICINE

## 2020-02-05 PROCEDURE — 36415 COLL VENOUS BLD VENIPUNCTURE: CPT

## 2020-02-05 RX ADMIN — HYDROMORPHONE HYDROCHLORIDE 1 MG: 1 INJECTION, SOLUTION INTRAMUSCULAR; INTRAVENOUS; SUBCUTANEOUS at 19:38

## 2020-02-05 RX ADMIN — POTASSIUM CHLORIDE AND SODIUM CHLORIDE: 450; 150 INJECTION, SOLUTION INTRAVENOUS at 07:56

## 2020-02-05 RX ADMIN — HYDROMORPHONE HYDROCHLORIDE 1 MG: 1 INJECTION, SOLUTION INTRAMUSCULAR; INTRAVENOUS; SUBCUTANEOUS at 11:56

## 2020-02-05 RX ADMIN — ENOXAPARIN SODIUM 40 MG: 100 INJECTION SUBCUTANEOUS at 07:48

## 2020-02-05 RX ADMIN — HYDROMORPHONE HYDROCHLORIDE 1 MG: 1 INJECTION, SOLUTION INTRAMUSCULAR; INTRAVENOUS; SUBCUTANEOUS at 23:40

## 2020-02-05 RX ADMIN — CLONAZEPAM 0.5 MG: 0.5 TABLET ORAL at 04:05

## 2020-02-05 RX ADMIN — HYDROMORPHONE HYDROCHLORIDE 1 MG: 1 INJECTION, SOLUTION INTRAMUSCULAR; INTRAVENOUS; SUBCUTANEOUS at 17:38

## 2020-02-05 RX ADMIN — CLONAZEPAM 0.5 MG: 0.5 TABLET ORAL at 17:38

## 2020-02-05 RX ADMIN — KETOROLAC TROMETHAMINE 15 MG: 30 INJECTION, SOLUTION INTRAMUSCULAR at 00:45

## 2020-02-05 RX ADMIN — HYDROMORPHONE HYDROCHLORIDE 1 MG: 1 INJECTION, SOLUTION INTRAMUSCULAR; INTRAVENOUS; SUBCUTANEOUS at 14:52

## 2020-02-05 RX ADMIN — HYDROMORPHONE HYDROCHLORIDE 1 MG: 1 INJECTION, SOLUTION INTRAMUSCULAR; INTRAVENOUS; SUBCUTANEOUS at 06:10

## 2020-02-05 RX ADMIN — HYDROMORPHONE HYDROCHLORIDE 1 MG: 1 INJECTION, SOLUTION INTRAMUSCULAR; INTRAVENOUS; SUBCUTANEOUS at 21:35

## 2020-02-05 RX ADMIN — KETOROLAC TROMETHAMINE 15 MG: 30 INJECTION, SOLUTION INTRAMUSCULAR at 07:49

## 2020-02-05 RX ADMIN — HYDROMORPHONE HYDROCHLORIDE 1 MG: 1 INJECTION, SOLUTION INTRAMUSCULAR; INTRAVENOUS; SUBCUTANEOUS at 08:54

## 2020-02-05 RX ADMIN — HYDROMORPHONE HYDROCHLORIDE 1 MG: 1 INJECTION, SOLUTION INTRAMUSCULAR; INTRAVENOUS; SUBCUTANEOUS at 01:31

## 2020-02-05 RX ADMIN — HYDROMORPHONE HYDROCHLORIDE 1 MG: 1 INJECTION, SOLUTION INTRAMUSCULAR; INTRAVENOUS; SUBCUTANEOUS at 04:05

## 2020-02-05 RX ADMIN — SERTRALINE 25 MG: 50 TABLET, FILM COATED ORAL at 04:05

## 2020-02-05 ASSESSMENT — ENCOUNTER SYMPTOMS
ABDOMINAL PAIN: 1
DIARRHEA: 0
SPUTUM PRODUCTION: 0
FOCAL WEAKNESS: 0
HEADACHES: 0
SENSORY CHANGE: 0
INSOMNIA: 0
DOUBLE VISION: 0
PALPITATIONS: 0
CHILLS: 0
MEMORY LOSS: 0
SORE THROAT: 0
LOSS OF CONSCIOUSNESS: 0
BLURRED VISION: 0
MYALGIAS: 0
SPEECH CHANGE: 0
DEPRESSION: 0
SHORTNESS OF BREATH: 0
WHEEZING: 0
HEARTBURN: 0
PHOTOPHOBIA: 0
SEIZURES: 0
DIZZINESS: 0
COUGH: 0
BLOOD IN STOOL: 0
HEMOPTYSIS: 0
NAUSEA: 0
VOMITING: 0
ORTHOPNEA: 0
FEVER: 0
WEIGHT LOSS: 0

## 2020-02-05 NOTE — PROGRESS NOTES
Spiritual Care Note    Patient Information     Patient's Name: Corazon Berg   MRN: [id number]    YOB: 1975   Age and Gender: 44 y.o. female   Service Area: Orthopedics   Room (and Bed): [place] [address]   Ethnicity or Nationality:     Primary Language: English   Synagogue/Spiritual preference: None   Place of Residence: [place], [place]   Family/Friends/Others Present: No   Clinical Team Present: No   Medical Diagnosis(-es)/Procedure(s): Small bowel obstruction   Code Status: Full Code    Date of Admission: 2/3/2020   Length of Stay: 2 days        Spiritual Care Provider Information:  Name of Spiritual Care Provider: Earlene Watson  Title of Spiritual Care Provider: Associate   Phone Number: [phone]  E-mail: [email or web address]  Total time : 15 minutes    Spiritual Screen Results:    Gen Nursing  Spiritual Screen  Is your spiritual health or inner well-being important to you as you cope with your medical condition?: Yes  Would you like to receive a visit from our Spiritual Care team or your own Baptist or spiritual leader?: Yes  Was spiritual care education provided to the patient?: Yes     Palliative Care  PC Synagogue/Spiritual Screening  Was spiritual care education provided to the patient?: Yes      Encounter/Request Information  Encounter/Request Type   Visited With: Patient  Nature of the Visit: Initial  General Visit: Yes  Referral From/ Origin of Request: Epic nursing    Religous Needs/Values       Spiritual Assessment     Spiritual Care Encounters    Observations/Symptoms: Accepting, Sadness, Thankfulness    Interaction/Conversation: Pt reports she is being discharged today.  Spoke at length about her grief over the death of her brother five months ago.  Thanked the  for visiting.    Assessment: Need, Distress    Need: Family Issues/Concern    Distress: Grief/Loss/Bereavement    Interventions: Compassionate presence, active listening.    Outcomes:  Ability to Communicate with Truth and Honesty    Plan: No Further Visits    Notes:

## 2020-02-05 NOTE — CARE PLAN
Problem: Communication  Goal: The ability to communicate needs accurately and effectively will improve  Outcome: PROGRESSING AS EXPECTED  Note:   Patient able to communicate needs, call light in reach, will call for assistance if needed.      Problem: Pain Management  Goal: Pain level will decrease to patient's comfort goal  Outcome: PROGRESSING AS EXPECTED  Note:   Patient understands pain scale, has taken PRN medication throughout shift.

## 2020-02-05 NOTE — PROGRESS NOTES
Daily Progress Note:     Date of Service: 2/5/2020  Primary Team: [facility] Green Team   Attending: Ben Rhodes M.D.   Senior Resident: Dr. Stacy  Intern: Dr. Mercado  Contact:  [phone]    Chief Complaint:   Abdominal pain, nausea/vomiting    Subjective:  -No acute events overnight; patient was afebrile  -She c/o pain in the abdomen which has much improved compared to yesterday; no nausea/vomiting/fever  -did not have any BM since admission but passing flatus  -On exam, abdominal distension has decreased markedly; mild tenderness manily in RLQ; BS present  -VS are stable; no leukocytosis or electrolyte abnormalities  -NG tube in place; 1100cc suction since last 24 hrs   -On IV dilaudid 1 mg Q 2hrs PRN; will add IV toradol 15 mg Q 6 hrs PRN  -oral contrast filled colon in <3 hrs yesterday indicating no obstruction  -General surgery recommended medical management; ordered milk and molasses enema; appreciate  recommendations  -NG clamping and trial of clear liquid today    Consultants/Specialty:  General Surgery    Review of Systems:      Review of Systems   Constitutional: Negative for chills, fever, malaise/fatigue and weight loss.   HENT: Negative for congestion, ear discharge, nosebleeds and sore throat.    Eyes: Negative for blurred vision, double vision and photophobia.   Respiratory: Negative for cough, hemoptysis, sputum production, shortness of breath and wheezing.    Cardiovascular: Negative for chest pain, palpitations, orthopnea and leg swelling.   Gastrointestinal: Positive for abdominal pain. Negative for blood in stool, diarrhea, heartburn, nausea and vomiting.   Genitourinary: Negative for hematuria.   Musculoskeletal: Negative for joint pain and myalgias.   Skin: Negative for itching and rash.   Neurological: Negative for dizziness, sensory change, speech change, focal weakness, seizures, loss of consciousness and headaches.   Psychiatric/Behavioral: Negative for depression and memory loss. The  patient does not have insomnia.        Objective Data:   Physical Exam:   Vitals:   Temp:  [36.3 °C (97.4 °F)-36.9 °C (98.4 °F)] 36.7 °C (98 °F)  Pulse:  [60-77] 60  Resp:  [15-17] 15  BP: (134-166)/() 142/84  SpO2:  [93 %-97 %] 93 %     Physical Exam  Constitutional:       General: She is not in acute distress.     Appearance: Normal appearance. She is not ill-appearing, toxic-appearing or diaphoretic.   HENT:      Head: Normocephalic and atraumatic.      Nose: Nose normal. No congestion or rhinorrhea.      Mouth/Throat:      Mouth: Mucous membranes are wet.     Pharynx: No oropharyngeal exudate or posterior oropharyngeal erythema.   Eyes:      Extraocular Movements: Extraocular movements intact.      Pupils: Pupils are equal, round, and reactive to light.   Neck:      Musculoskeletal: Normal range of motion and neck supple. No neck rigidity or muscular tenderness.   Cardiovascular:      Rate and Rhythm: Normal rate and regular rhythm.      Heart sounds: No murmur. No friction rub. No gallop.    Pulmonary:      Effort: Pulmonary effort is normal. No respiratory distress.      Breath sounds: Normal breath sounds. No wheezing, rhonchi or rales.   Abdominal:      General: There is mild distension.      Palpations: There is no mass.      Tenderness: There is tenderness. There is no guarding or rebound.      Hernia: No hernia is present.      Comments: mildly tender abdomen with multiple visible scars from previous surgeries   Musculoskeletal: Normal range of motion.         General: No swelling or tenderness.   Skin:     General: Skin is dry.      Coloration: Skin is not jaundiced or pale.      Findings: No bruising or erythema.   Neurological:      General: No focal deficit present.      Mental Status: She is alert and oriented to person, place, and time.   Psychiatric:         Mood and Affect: Mood normal.         Behavior: Behavior normal.       Labs:     Recent Labs     02/03/20  0715 02/04/20  0504  02/05/20  0029   WBC 10.6 5.3 5.5   RBC 5.08 4.52 4.41   HEMOGLOBIN 15.0 13.3 13.2   HEMATOCRIT 47.1* 41.7 40.9   MCV 92.7 92.3 92.7   MCH 29.5 29.4 29.9   RDW 43.2 43.8 43.5   PLATELETCT 215 181 188   MPV 10.3 10.2 10.2   NEUTSPOLYS 84.90* 56.40 61.40   LYMPHOCYTES 8.10* 31.00 29.00   MONOCYTES 5.60 8.60 8.20   EOSINOPHILS 0.50 3.00 0.70   BASOPHILS 0.40 0.80 0.50     Recent Labs     02/03/20  0715 02/04/20  0504 02/05/20  0029   SODIUM 137 137 138   POTASSIUM 4.0 3.7 4.1   CHLORIDE 100 106 105   CO2 29 26 23   GLUCOSE 131* 99 76   BUN 8 9 12       Imaging:     [id number] VIEW   Final Result      Mildly dilated loops of small intestine.      Radiographic contrast present within colon related to recent upper GI performed on the same date.      DX-UPPER GI-SERIES WITH KUB   Final Result      No abnormalities are noted on upper GI series.      CT-ABDOMEN-PELVIS WITH   Final Result      1.  Subtotal dilatation of the small bowel with possible transition point in the right lower quadrant consistent with high-grade partial small bowel obstruction.      2.  Fluid and stool throughout the colon.      3.  Postoperative changes in the proximal and distal colon.      4.  No free air or abscess.      IR-US GUIDED PIV    (Results Pending)       Assessment and Plan:     * SBO (small bowel obstruction) (HCC)- (present on admission)  Assessment & Plan  -Patient presented with sudden onset, crampy, 10/10, abdominal pain with nausea and vomiting from yesterday night; CBC showed WBC of 10.6 with neutrophils of 9.4; electrolytes, BUN and creatinine were WNL; CT abdomen pelvis showed subtotal dilation of small bowel consistent with high-grade partial small bowel obstruction.    -Admitted inpatient for further management  -VS are stable; no leukocytosis or electrolyte abnormalities  -Electrolyte replacement  -IV Zofran PRN for nausea and vomiting  -On IV dilaudid 1 mg Q 2hrs PRN; will add IV toradol 15 mg Q 6 hrs PRN  -oral contrast  filled colon in <3 hrs yesterday indicating no obstruction  -General surgery recommended medical management; ordered milk and molasses enema; appreciate  recommendations  -NG clamping and trial of clear liquid today      Chronic narcotic dependence (HCC)- (present on admission)  Assessment & Plan  -Per patient she has been on opiate medication for chronic abdominal pain for the past many years  -Currently on Roxicodone 10 mg 6 times a day and Dilaudid tab 2 mg twice daily at home      Neurogenic bladder-straight cath - (present on admission)  Assessment & Plan  -Patient has been having neurogenic bladder and doing straight cath 3 times a day since 1998  -Takes nitrofurantoin 50 mg for 1 to 2 days when urine looks dirty  -She took nitrofurantoin for 1 day 3 days ago

## 2020-02-05 NOTE — CARE PLAN
KUB and xray completed today.     Problem: Communication  Goal: The ability to communicate needs accurately and effectively will improve  2/4/2020 1645 by Sadaf Capellan R.N.  Outcome: PROGRESSING AS EXPECTED  Note:   A/Ox4. Able to make needs known. Uses call light appropriately.   2/4/2020 1624 by Sadaf Capellan R.N.  Outcome: PROGRESSING AS EXPECTED  Note:   A/Ox4. Able to make needs known.      Problem: Safety  Goal: Will remain free from injury  2/4/2020 1645 by Sadaf Capellan R.N.  Outcome: PROGRESSING AS EXPECTED  Note:   Ambulates safely with steady gait. Bed in lowest position. Call light and personal items within reach. Non skid socks on.   2/4/2020 1624 by Sadaf Capellan R.N.  Outcome: PROGRESSING AS EXPECTED  Note:   Ambulates safely with steady gait. Bed in lowest position. Non skid socks on. Call light and personal items within reach.      Problem: Infection  Goal: Will remain free from infection  2/4/2020 1645 by Sadaf Capellan R.N.  Outcome: PROGRESSING AS EXPECTED  Note:   Afebrile. WBC 5.3. VS elevated when C/O pain.   2/4/2020 1624 by Sadaf Capellan R.N.  Outcome: PROGRESSING AS EXPECTED  Note:   Afebrile.      Problem: Venous Thromboembolism (VTW)/Deep Vein Thrombosis (DVT) Prevention:  Goal: Patient will participate in Venous Thrombosis (VTE)/Deep Vein Thrombosis (DVT)Prevention Measures  Outcome: PROGRESSING AS EXPECTED  Note:   Lovenox provided.      Problem: Bowel/Gastric:  Goal: Normal bowel function is maintained or improved  Outcome: PROGRESSING AS EXPECTED  Note:   Passing gas. Continued C/O R sd abdominal pain. C/O nausea throughout day. C/O H/A S/P zofran admin. Strict NPO. NG tube to low int suction. NG output as charted. Bowel sounds normal.      Problem: Pain Management  Goal: Pain level will decrease to patient's comfort goal  Outcome: PROGRESSING AS EXPECTED  Note:   Dilaudid and Toradol provided for pain.      Problem: Fluid Volume:  Goal: Will maintain balanced intake and  output  Outcome: PROGRESSING AS EXPECTED  Note:   IVF changed to 0.45 NS w/ 20 Kcl.      Problem: Mobility  Goal: Risk for activity intolerance will decrease  Outcome: PROGRESSING AS EXPECTED  Note:   Up inpendently ad luz elena.      Problem: Urinary Elimination:  Goal: Ability to reestablish a normal urinary elimination pattern will improve  Outcome: PROGRESSING AS EXPECTED  Note:   Straight caths self. This is chronic. Pt denies any problems with this.

## 2020-02-06 ENCOUNTER — PATIENT OUTREACH (OUTPATIENT)
Dept: HEALTH INFORMATION MANAGEMENT | Facility: OTHER | Age: 45
End: 2020-02-06

## 2020-02-06 ENCOUNTER — APPOINTMENT (OUTPATIENT)
Dept: RADIOLOGY | Facility: MEDICAL CENTER | Age: 45
DRG: 389 | End: 2020-02-06
Attending: SURGERY
Payer: MEDICAID

## 2020-02-06 VITALS
OXYGEN SATURATION: 96 % | TEMPERATURE: 97.8 F | DIASTOLIC BLOOD PRESSURE: 109 MMHG | RESPIRATION RATE: 15 BRPM | HEIGHT: 65 IN | SYSTOLIC BLOOD PRESSURE: 162 MMHG | HEART RATE: 61 BPM | BODY MASS INDEX: 31.4 KG/M2 | WEIGHT: 188.49 LBS

## 2020-02-06 LAB
ALBUMIN SERPL BCP-MCNC: 4 G/DL (ref 3.2–4.9)
ALBUMIN/GLOB SERPL: 1.4 G/DL
ALP SERPL-CCNC: 45 U/L (ref 30–99)
ALT SERPL-CCNC: 8 U/L (ref 2–50)
ANION GAP SERPL CALC-SCNC: 8 MMOL/L (ref 0–11.9)
AST SERPL-CCNC: 17 U/L (ref 12–45)
BASOPHILS # BLD AUTO: 0.6 % (ref 0–1.8)
BASOPHILS # BLD: 0.03 K/UL (ref 0–0.12)
BILIRUB SERPL-MCNC: 0.6 MG/DL (ref 0.1–1.5)
BUN SERPL-MCNC: 9 MG/DL (ref 8–22)
CALCIUM SERPL-MCNC: 8.7 MG/DL (ref 8.5–10.5)
CHLORIDE SERPL-SCNC: 105 MMOL/L (ref 96–112)
CO2 SERPL-SCNC: 23 MMOL/L (ref 20–33)
CREAT SERPL-MCNC: 0.63 MG/DL (ref 0.5–1.4)
EOSINOPHIL # BLD AUTO: 0.2 K/UL (ref 0–0.51)
EOSINOPHIL NFR BLD: 4.2 % (ref 0–6.9)
ERYTHROCYTE [DISTWIDTH] IN BLOOD BY AUTOMATED COUNT: 41 FL (ref 35.9–50)
GLOBULIN SER CALC-MCNC: 2.9 G/DL (ref 1.9–3.5)
GLUCOSE SERPL-MCNC: 94 MG/DL (ref 65–99)
HCT VFR BLD AUTO: 40.2 % (ref 37–47)
HGB BLD-MCNC: 13.4 G/DL (ref 12–16)
IMM GRANULOCYTES # BLD AUTO: 0.01 K/UL (ref 0–0.11)
IMM GRANULOCYTES NFR BLD AUTO: 0.2 % (ref 0–0.9)
LYMPHOCYTES # BLD AUTO: 1.74 K/UL (ref 1–4.8)
LYMPHOCYTES NFR BLD: 36.6 % (ref 22–41)
MAGNESIUM SERPL-MCNC: 1.8 MG/DL (ref 1.5–2.5)
MCH RBC QN AUTO: 30.2 PG (ref 27–33)
MCHC RBC AUTO-ENTMCNC: 33.3 G/DL (ref 33.6–35)
MCV RBC AUTO: 90.7 FL (ref 81.4–97.8)
MONOCYTES # BLD AUTO: 0.5 K/UL (ref 0–0.85)
MONOCYTES NFR BLD AUTO: 10.5 % (ref 0–13.4)
NEUTROPHILS # BLD AUTO: 2.28 K/UL (ref 2–7.15)
NEUTROPHILS NFR BLD: 47.9 % (ref 44–72)
NRBC # BLD AUTO: 0 K/UL
NRBC BLD-RTO: 0 /100 WBC
PLATELET # BLD AUTO: 185 K/UL (ref 164–446)
PMV BLD AUTO: 10 FL (ref 9–12.9)
POTASSIUM SERPL-SCNC: 4 MMOL/L (ref 3.6–5.5)
PROT SERPL-MCNC: 6.9 G/DL (ref 6–8.2)
RBC # BLD AUTO: 4.43 M/UL (ref 4.2–5.4)
SODIUM SERPL-SCNC: 136 MMOL/L (ref 135–145)
WBC # BLD AUTO: 4.8 K/UL (ref 4.8–10.8)

## 2020-02-06 PROCEDURE — 306783: Performed by: STUDENT IN AN ORGANIZED HEALTH CARE EDUCATION/TRAINING PROGRAM

## 2020-02-06 PROCEDURE — 700111 HCHG RX REV CODE 636 W/ 250 OVERRIDE (IP): Performed by: SURGERY

## 2020-02-06 PROCEDURE — 83735 ASSAY OF MAGNESIUM: CPT

## 2020-02-06 PROCEDURE — 80053 COMPREHEN METABOLIC PANEL: CPT

## 2020-02-06 PROCEDURE — 700102 HCHG RX REV CODE 250 W/ 637 OVERRIDE(OP): Performed by: STUDENT IN AN ORGANIZED HEALTH CARE EDUCATION/TRAINING PROGRAM

## 2020-02-06 PROCEDURE — 85025 COMPLETE CBC W/AUTO DIFF WBC: CPT

## 2020-02-06 PROCEDURE — 36415 COLL VENOUS BLD VENIPUNCTURE: CPT

## 2020-02-06 PROCEDURE — 74018 RADEX ABDOMEN 1 VIEW: CPT

## 2020-02-06 PROCEDURE — 306783: Performed by: INTERNAL MEDICINE

## 2020-02-06 PROCEDURE — A9270 NON-COVERED ITEM OR SERVICE: HCPCS | Performed by: STUDENT IN AN ORGANIZED HEALTH CARE EDUCATION/TRAINING PROGRAM

## 2020-02-06 PROCEDURE — 99238 HOSP IP/OBS DSCHRG MGMT 30/<: CPT | Mod: GC | Performed by: INTERNAL MEDICINE

## 2020-02-06 PROCEDURE — 700111 HCHG RX REV CODE 636 W/ 250 OVERRIDE (IP): Performed by: STUDENT IN AN ORGANIZED HEALTH CARE EDUCATION/TRAINING PROGRAM

## 2020-02-06 RX ORDER — ONDANSETRON 4 MG/1
4 TABLET, FILM COATED ORAL EVERY 4 HOURS PRN
Qty: 20 TAB | Refills: 0 | Status: SHIPPED | OUTPATIENT
Start: 2020-02-06 | End: 2020-02-13

## 2020-02-06 RX ADMIN — ENOXAPARIN SODIUM 40 MG: 100 INJECTION SUBCUTANEOUS at 07:35

## 2020-02-06 RX ADMIN — HYDROMORPHONE HYDROCHLORIDE 1 MG: 1 INJECTION, SOLUTION INTRAMUSCULAR; INTRAVENOUS; SUBCUTANEOUS at 10:58

## 2020-02-06 RX ADMIN — HYDROMORPHONE HYDROCHLORIDE 1 MG: 1 INJECTION, SOLUTION INTRAMUSCULAR; INTRAVENOUS; SUBCUTANEOUS at 02:17

## 2020-02-06 RX ADMIN — SERTRALINE 25 MG: 50 TABLET, FILM COATED ORAL at 04:31

## 2020-02-06 RX ADMIN — CLONAZEPAM 0.5 MG: 0.5 TABLET ORAL at 04:31

## 2020-02-06 RX ADMIN — HYDROMORPHONE HYDROCHLORIDE 1 MG: 1 INJECTION, SOLUTION INTRAMUSCULAR; INTRAVENOUS; SUBCUTANEOUS at 04:31

## 2020-02-06 RX ADMIN — HYDROMORPHONE HYDROCHLORIDE 1 MG: 1 INJECTION, SOLUTION INTRAMUSCULAR; INTRAVENOUS; SUBCUTANEOUS at 07:35

## 2020-02-06 ASSESSMENT — ENCOUNTER SYMPTOMS
SORE THROAT: 0
BLURRED VISION: 0
MYALGIAS: 0
SPUTUM PRODUCTION: 0
SHORTNESS OF BREATH: 0
WHEEZING: 0
PHOTOPHOBIA: 0
FOCAL WEAKNESS: 0
DIZZINESS: 0
HEADACHES: 0
VOMITING: 0
DEPRESSION: 0
WEIGHT LOSS: 0
HEMOPTYSIS: 0
SEIZURES: 0
COUGH: 0
ABDOMINAL PAIN: 1
NAUSEA: 0
DOUBLE VISION: 0
PALPITATIONS: 0
DIARRHEA: 0
SENSORY CHANGE: 0
ROS GI COMMENTS: BM 2/5
HEARTBURN: 0
FEVER: 0
BLOOD IN STOOL: 0
MEMORY LOSS: 0
ORTHOPNEA: 0
CHILLS: 0
LOSS OF CONSCIOUSNESS: 0
SPEECH CHANGE: 0
INSOMNIA: 0

## 2020-02-06 NOTE — PROGRESS NOTES
Patient discharged home with friend escorted via wheelchair by staff. Medications were delivered to room by [facility] Beds before patient discharged. Discharge paperwork signed, patient verbalized understanding.

## 2020-02-06 NOTE — DISCHARGE INSTRUCTIONS
Discharge Instructions    Discharged to home by car with friend. Discharged via wheelchair, hospital escort: Yes.  Special equipment needed: Not Applicable    Be sure to schedule a follow-up appointment with your primary care doctor or any specialists as instructed.     Discharge Plan:   Influenza Vaccine Indication: Patient Refuses    I understand that a diet low in cholesterol, fat, and sodium is recommended for good health. Unless I have been given specific instructions below for another diet, I accept this instruction as my diet prescription.   Other diet: GI soft then can progress to regular    Special Instructions: None    · Is patient discharged on Warfarin / Coumadin?   No     Depression / Suicide Risk    As you are discharged from this [facility] facility, it is important to learn how to keep safe from harming yourself.    Recognize the warning signs:  · Abrupt changes in personality, positive or negative- including increase in energy   · Giving away possessions  · Change in eating patterns- significant weight changes-  positive or negative  · Change in sleeping patterns- unable to sleep or sleeping all the time   · Unwillingness or inability to communicate  · Depression  · Unusual sadness, discouragement and loneliness  · Talk of wanting to die  · Neglect of personal appearance   · Rebelliousness- reckless behavior  · Withdrawal from people/activities they love  · Confusion- inability to concentrate     If you or a loved one observes any of these behaviors or has concerns about self-harm, here's what you can do:  · Talk about it- your feelings and reasons for harming yourself  · Remove any means that you might use to hurt yourself (examples: pills, rope, extension cords, firearm)  · Get professional help from the community (Mental Health, Substance Abuse, psychological counseling)  · Do not be alone:Call your Safe Contact- someone whom you trust who will be there for you.  · Call your local CRISIS  HOTLINE 526-4391 or 704-504-7124  · Call your local Children's Mobile Crisis Response Team [place] (645) 186-0927 or www.komoot  · Call the toll free National Suicide Prevention Hotlines   · National Suicide Prevention Lifeline 094-085-QTWF (7781)  · [employer] Line Network 800-SUICIDE (489-8486)      Constipation, Adult  Constipation is when a person has fewer bowel movements in a week than normal, has difficulty having a bowel movement, or has stools that are dry, hard, or larger than normal. Constipation may be caused by an underlying condition. It may become worse with age if a person takes certain medicines and does not take in enough fluids.  Follow these instructions at home:  Eating and drinking  · Eat foods that have a lot of fiber, such as fresh fruits and vegetables, whole grains, and beans.  · Limit foods that are high in fat, low in fiber, or overly processed, such as french fries, hamburgers, cookies, candies, and soda.  · Drink enough fluid to keep your urine clear or pale yellow.  General instructions  · Exercise regularly or as told by your health care provider.  · Go to the restroom when you have the urge to go. Do not hold it in.  · Take over-the-counter and prescription medicines only as told by your health care provider. These include any fiber supplements.  · Practice pelvic floor retraining exercises, such as deep breathing while relaxing the lower abdomen and pelvic floor relaxation during bowel movements.  · Watch your condition for any changes.  · Keep all follow-up visits as told by your health care provider. This is important.  Contact a health care provider if:  · You have pain that gets worse.  · You have a fever.  · You do not have a bowel movement after 4 days.  · You vomit.  · You are not hungry.  · You lose weight.  · You are bleeding from the anus.  · You have thin, pencil-like stools.  Get help right away if:  · You have a fever and your symptoms suddenly get  worse.  · You leak stool or have blood in your stool.  · Your abdomen is bloated.  · You have severe pain in your abdomen.  · You feel dizzy or you faint.  This information is not intended to replace advice given to you by your health care provider. Make sure you discuss any questions you have with your health care provider.  Document Released: 09/15/2005 Document Revised: 07/07/2017 Document Reviewed: 06/07/2017  [employer] Patient Education © 2017 [employer]      Opioid Overdose  Introduction  Opioids are substances that relieve pain by binding to pain receptors in your brain and spinal cord. Opioids include illegal drugs, such as heroin, as well as prescription pain medicines. An opioid overdose happens when you take too much of an opioid substance. This can happen with any type of opioid, including:  · Heroin.  · Morphine.  · Codeine.  · Methadone.  · Oxycodone.  · Hydrocodone.  · Fentanyl.  · Hydromorphone.  · Buprenorphine.  The effects of an overdose can be mild, dangerous, or even deadly. Opioid overdose is a medical emergency.  What are the causes?  This condition may be caused by:  · Taking too much of an opioid by accident.  · Taking too much of an opioid on purpose.  · An error made by a health care provider who prescribes a medicine.  · An error made by the pharmacist who fills the prescription order.  · Using more than one substance that contains opioids at the same time.  · Mixing an opioid with a substance that affects your heart, breathing, or blood pressure. These include alcohol, tranquilizers, sleeping pills, illegal drugs, and some over-the-counter medicines.  What increases the risk?  This condition is more likely in:  · Children. They may be attracted to colorful pills. Because of a child's small size, even a small amount of a drug can be dangerous.  · Elderly people. They may be taking many different drugs. Elderly people may have difficulty reading labels or remembering when  they last took their medicine.  · People who take an opioid on a long-term basis.  · People who use:  ¨ Illegal drugs.  ¨ Other substances, including alcohol, while using an opioid.  · People who have:  ¨ A history of drug or alcohol abuse.  ¨ Certain mental health conditions.  · People who take opioids that are not prescribed for them.  What are the signs or symptoms?  Symptoms of this condition depend on the type of opioid and the amount that was taken. Common symptoms include:  · Sleepiness or difficulty waking from sleep.  · Confusion.  · Slurred speech.  · Slowed breathing and a slow pulse.  · Nausea and vomiting.  · Abnormally small pupils.  Signs and symptoms that require emergency treatment include:  · Cold, clammy, and pale skin.  · Blue lips and fingernails.  · Vomiting.  · Gurgling sounds in the throat.  · A pulse that is very slow or difficult to detect.  · Breathing that is very slow, noisy, or difficult to detect.  · Limp body.  · Inability to respond to speech or be awakened from sleep (stupor).  How is this diagnosed?  This condition is diagnosed based on your symptoms. It is important to tell your health care provider:  · All of the opioids that you took.  · When you took the opioids.  · Whether you were drinking alcohol or using other substances.  Your health care provider will do a physical exam. This exam may include:  · Checking and monitoring your heart rate and rhythm, your breathing rate and depth, your temperature, and your blood pressure (vital signs).  · Checking for abnormally small pupils.  · Measuring oxygen levels in your blood.  You may also have blood tests or urine tests.  How is this treated?  Supporting your vital signs and your breathing is the first step in treating an opioid overdose. Treatment may also include:  · Giving fluids and minerals (electrolytes) through an IV tube.  · Inserting a breathing tube (endotracheal tube) in your airway to help you breathe.  · Giving  oxygen.  · Passing a tube through your nose and into your stomach (NG tube, or nasogastric tube) to wash out your stomach.  · Giving medicines that:  ¨ Increase your blood pressure.  ¨ Absorb any opioid that is in your digestive system.  ¨ Reverse the effects of the opioid (naloxone).  · Ongoing counseling and mental health support if you intentionally overdosed or used an illegal drug.  Follow these instructions at home:  · Take over-the-counter and prescription medicines only as told by your health care provider. Always ask your health care provider about possible side effects and interactions of any new medicine that you start taking.  · Keep a list of all of the medicines that you take, including over-the-counter medicines. Bring this list with you to all of your medical visits.  · Drink enough fluid to keep your urine clear or pale yellow.  · Keep all follow-up visits as told by your health care provider. This is important.  How is this prevented?  · Get help if you are struggling with:  ¨ Alcohol or drug use.  ¨ Depression or another mental health problem.  · Keep the phone number of your local poison control center near your phone or on your cell phone.  · Store all medicines in safety containers that are out of the reach of children.  · Read the drug inserts that come with your medicines.  · Do not drink alcohol when taking opioids.  · Do not use illegal drugs.  · Do not take opioid medicines that are not prescribed for you.  Contact a health care provider if:  · Your symptoms return.  · You develop new symptoms or side effects when you are taking medicines.  Get help right away if:  · You think that you or someone else may have taken too much of an opioid. The hotline of the National Poison Control Center is (141) 200-3506.  · You or someone else is having symptoms of an opioid overdose.  · You have serious thoughts about hurting yourself or others.  · You have:  ¨ Chest pain.  ¨ Difficulty breathing.  ¨ A  loss of consciousness.  Opioid overdose is an emergency. Do not wait to see if the symptoms will go away. Get medical help right away. Call your local emergency services (911 in the [place] Do not drive yourself to the hospital.   This information is not intended to replace advice given to you by your health care provider. Make sure you discuss any questions you have with your health care provider.  Document Released: 01/25/2006 Document Revised: 05/25/2017 Document Reviewed: 12/31/2014  © 2017 [employer]

## 2020-02-06 NOTE — DISCHARGE PLANNING
Medicaid Meds-to-Beds: Discharge prescription orders listed below delivered to patient's bedside. RN notified. Patient counseled.       Corazon Berg   Home Medication Instructions RENAE:[id number]    Printed on:02/06/20 1230   Medication Information                      magnesium hydroxide (MILK OF MAGNESIA) 400 MG/5ML Suspension  Take 30 mL by mouth 1 time daily as needed (For constipation).             ondansetron (ZOFRAN) 4 MG Tab tablet  Take 1 Tab by mouth every four hours as needed for Nausea/Vomiting for up to 7 days.               Mariluz Juarez, PharmD

## 2020-02-06 NOTE — DISCHARGE SUMMARY
Discharge Summary    Date of Admission: 2/3/2020  Date of Discharge: 2/6/2020  2:20 PM  Discharging Attending: Dr. Rhodes  Discharging Senior Resident: Dr. Stacy  Discharging Intern: Dr. Mercado    CHIEF COMPLAINT ON ADMISSION  Chief Complaint   Patient presents with   • Abdominal Pain       Reason for Admission  Abdominal Pian    Admission Date  2/3/2020    CODE STATUS  Full Code    HPI & HOSPITAL COURSE  Ms. Berg is a 44 y.o. female with past medical history of multiple abdominal surgeries in the past with 2 episodes of mechanical small bowel obstruction, last one in 2015, neurogenic bladder, doing straight cath 3 times per day for the past 15 years and chronic opioid dependence, who presented on 2/3/2020 with a chief complaint of abdominal pain and nausea/vomiting.  She had tender abdomen with significant distention and guarding; normal vital signs and unremarkable lab work on arrival; CT abdomen showed subtotal dilation of small bowel consistent with high-grade partial small bowel obstruction. She was kept n.p.o. and was put on NG tube and was admitted for further management. General surgery Dr. Cuellar was consulted; recommended no surgical intervention with conservative treatment (n.p.o. and pain control).  During uncomplicated hospital stay, condition of patient improved day by day with remarkable decrease in abdominal pain, tenderness and distention. Oral contrast filled colon in <3 hrs indicating no obstruction. Patient had 6-7 bowel movements with loose stool after receiving milk and molasses enema (given constipation component) yesterday. Nausea and vomiting resolved and after removal of NG tube. Patient tolerated clear liquid diet and progressed to GI soft diet. Per patient, she has been taking oxycodone and Dilaudid for chronic abdominal pain for the past 18 years. Patient was advised to titrate down opioid medications which might be the reason for this episode of small bowel obstruction and to follow-up  with GI consultants outpatient whom patient sees regularly every 4 months. Patient was also advised to come to the ED in case of worsening abdominal pain with nausea/vomiting.      Therefore, she is discharged in good and stable condition to home with close outpatient follow-up.    The patient met 2-midnight criteria for an inpatient stay at the time of discharge.    Discharge Date  2/6/2020    FOLLOW UP ITEMS POST DISCHARGE  Patient will have to follow up with GI outpatient for follow up given this admission for partial small bowel obstruction.    DISCHARGE DIAGNOSES  Principal Problem:    SBO (small bowel obstruction) ([facility]) POA: Yes  Active Problems:    Chronic narcotic dependence ([facility]) POA: Yes    Neurogenic bladder-straight cath  POA: Yes  Resolved Problems:    * No resolved hospital problems. *      FOLLOW UP  No future appointments.      In 1 month  Apt already set up in March for GI    Nani Lopez M.D.  [address]  [facility] [address]  [phone]          Marcelina Green M.D.  [address] [facility] [address]  [facility] [address]  [phone]    Schedule an appointment as soon as possible for a visit        MEDICATIONS ON DISCHARGE     Medication List      START taking these medications      Instructions   magnesium hydroxide 400 MG/5ML Susp  Commonly known as:  MILK OF MAGNESIA   Doctor's comments:  Do not take if loose stool or diarrhea  Take 30 mL by mouth 1 time daily as needed (For constipation).  Dose:  30 mL     ondansetron 4 MG Tabs tablet  Commonly known as:  Zofran   Take 1 Tab by mouth every four hours as needed for Nausea/Vomiting for up to 7 days.  Dose:  4 mg        CONTINUE taking these medications      Instructions   clonazePAM 0.5 MG Tabs  Commonly known as:  KLONOPIN   Take 0.5 mg by mouth 3 times a day as needed.  Dose:  0.5 mg     cyanocobalamin 1000 MCG/ML Soln  Commonly known as:  VITAMIN B-12   1,000 mcg by Intramuscular route every 7 days.  Dose:  1,000 mcg     HYDROmorphone 2 MG  "Tabs  Commonly known as:  DILAUDID   Take 2 mg by mouth 2 Times a Day.  Dose:  2 mg     mirtazapine 15 MG Tabs  Commonly known as:  REMERON   Take 3.75-7.5 mg by mouth every evening.  Dose:  3.75-7.5 mg     Narcan 4 MG/0.1ML Liqd  Generic drug:  Naloxone   Spray 1 Spray in nose as needed.  Dose:  1 Spray     nitrofurantoin 50 MG Caps  Commonly known as:  MACRODANTIN   Doctor's comments:  Please consider 90 day supplies to promote better adherence  TAKE ONE CAPSULE BY MOUTH AS NEEDED FOR  UTI  RELATED  TO  SELF  CATHETER     oxyCODONE immediate release 10 MG immediate release tablet  Commonly known as:  ROXICODONE   Take 10 mg by mouth 6 Times a Day.  Dose:  10 mg     Probiotic Formula 1-250 BILLION-MG Caps   Take 1 Tab by mouth every day.  Dose:  1 Tab            Allergies  Allergies   Allergen Reactions   • Sammy-1 [Lidocaine Hcl] Anaphylaxis     Topical or injectable, \"subq numbing anesthetics\"  \"Causes Methemoglobinemia.\"   • Pcn [Penicillins] Rash       DIET  Orders Placed This Encounter   Procedures   • Diet Order Low Fiber(GI Soft)     Standing Status:   Standing     Number of Occurrences:   1     Order Specific Question:   Diet:     Answer:   Low Fiber(GI Soft) [2]       ACTIVITY  As tolerated.  Weight bearing as tolerated    CONSULTATIONS  Dr. Cuellar with General Surgery Service consulted.  Treatment options were discussed and plan of care agreed upon.    PROCEDURES  None    "

## 2020-02-06 NOTE — CARE PLAN
Problem: Communication  Goal: The ability to communicate needs accurately and effectively will improve  Outcome: PROGRESSING AS EXPECTED  Note:   Patient able to communicate needs, call light in reach.      Problem: Safety  Goal: Will remain free from falls  Outcome: PROGRESSING AS EXPECTED  Note:   Patient understands fall precautions, dangles before standing, calls for assistance with IV pole.

## 2020-02-06 NOTE — PROGRESS NOTES
2/6 CHW Yao met pt at bedside and introduced CCM services. Pt states she has a PCP and all that she needs and decline services. Pt states it is fine CHW will call pt after d/c. CHW provided pt with brochure and CCM phone number and encouraged her to give CCM a call if she needs further assistance.     Plan: CHW will call pt after d/c to see how she is doing.

## 2020-02-06 NOTE — PROGRESS NOTES
Daily Progress Note:     Date of Service: 2/6/2020  Primary Team: [facility] Green Team   Attending: Ben Rhodes M.D.   Senior Resident: Dr. Stacy  Intern: Dr. Mercado  Contact:  [phone]    Chief Complaint:   Abdominal pain, nausea/vomiting    Subjective:  -No acute events overnight; patient was afebrile  -Patient was feeling much better this AM. She still c/o mild diffuse abdominal pain which is baseline for her. no nausea/vomiting/fever; tolerated clear liquid diet yesterday  -Patient had 6-7 BM with loose stool after receiving milk and molasses enema yesterday evening.  -On exam, abdominal distension was resolved; mild tenderness; BS present  -VS are stable; no leukocytosis or electrolyte abnormalities  -Removed NG in AM; tolerated GI soft diet.     Consultants/Specialty:  General Surgery    Review of Systems:      Review of Systems   Constitutional: Negative for chills, fever, malaise/fatigue and weight loss.   HENT: Negative for congestion, ear discharge, nosebleeds and sore throat.    Eyes: Negative for blurred vision, double vision and photophobia.   Respiratory: Negative for cough, hemoptysis, sputum production, shortness of breath and wheezing.    Cardiovascular: Negative for chest pain, palpitations, orthopnea and leg swelling.   Gastrointestinal: Positive for abdominal pain. Negative for blood in stool, diarrhea, heartburn, nausea and vomiting.   Genitourinary: Negative for hematuria.   Musculoskeletal: Negative for joint pain and myalgias.   Skin: Negative for itching and rash.   Neurological: Negative for dizziness, sensory change, speech change, focal weakness, seizures, loss of consciousness and headaches.   Psychiatric/Behavioral: Negative for depression and memory loss. The patient does not have insomnia.        Objective Data:   Physical Exam:   Vitals:   Temp:  [36.1 °C (97 °F)-36.7 °C (98 °F)] 36.6 °C (97.8 °F)  Pulse:  [61-76] 61  Resp:  [15-18] 15  BP: (157-168)/() 162/109  SpO2:  [96  %-97 %] 96 %     Physical Exam  Constitutional:       General: She is not in acute distress.     Appearance: Normal appearance. She is not ill-appearing, toxic-appearing or diaphoretic.   HENT:      Head: Normocephalic and atraumatic.      Nose: Nose normal. No congestion or rhinorrhea.      Mouth/Throat:      Mouth: Mucous membranes are wet.     Pharynx: No oropharyngeal exudate or posterior oropharyngeal erythema.   Eyes:      Extraocular Movements: Extraocular movements intact.      Pupils: Pupils are equal, round, and reactive to light.   Neck:      Musculoskeletal: Normal range of motion and neck supple. No neck rigidity or muscular tenderness.   Cardiovascular:      Rate and Rhythm: Normal rate and regular rhythm.      Heart sounds: No murmur. No friction rub. No gallop.    Pulmonary:      Effort: Pulmonary effort is normal. No respiratory distress.      Breath sounds: Normal breath sounds. No wheezing, rhonchi or rales.   Abdominal:      General: There is mild distension.      Palpations: There is no mass.      Tenderness: There is tenderness. There is no guarding or rebound.      Hernia: No hernia is present.      Comments: mildly tender abdomen with multiple visible scars from previous surgeries   Musculoskeletal: Normal range of motion.         General: No swelling or tenderness.   Skin:     General: Skin is dry.      Coloration: Skin is not jaundiced or pale.      Findings: No bruising or erythema.   Neurological:      General: No focal deficit present.      Mental Status: She is alert and oriented to person, place, and time.   Psychiatric:         Mood and Affect: Mood normal.         Behavior: Behavior normal.       Labs:     Recent Labs     02/04/20  0504 02/05/20  0029 02/06/20  0219   WBC 5.3 5.5 4.8   RBC 4.52 4.41 4.43   HEMOGLOBIN 13.3 13.2 13.4   HEMATOCRIT 41.7 40.9 40.2   MCV 92.3 92.7 90.7   MCH 29.4 29.9 30.2   RDW 43.8 43.5 41.0   PLATELETCT 181 188 185   MPV 10.2 10.2 10.0   NEUTSPOLYS  56.40 61.40 47.90   LYMPHOCYTES 31.00 29.00 36.60   MONOCYTES 8.60 8.20 10.50   EOSINOPHILS 3.00 0.70 4.20   BASOPHILS 0.80 0.50 0.60     Recent Labs     02/04/20  0504 02/05/20  0029 02/06/20  0219   SODIUM 137 138 136   POTASSIUM 3.7 4.1 4.0   CHLORIDE 106 105 105   CO2 26 23 23   GLUCOSE 99 76 94   BUN 9 12 9       Imaging:     [id number] VIEW   Final Result      1.  Interval resolution of small bowel dilatation.   2.  Enteric contrast present throughout the colon.      IR-US GUIDED PIV   Final Result    Ultrasound-guided PERIPHERAL IV INSERTION performed by    qualified nursing staff as above.            [id number] VIEW   Final Result      Mildly dilated loops of small intestine.      Radiographic contrast present within colon related to recent upper GI performed on the same date.      DX-UPPER GI-SERIES WITH KUB   Final Result      No abnormalities are noted on upper GI series.      CT-ABDOMEN-PELVIS WITH   Final Result      1.  Subtotal dilatation of the small bowel with possible transition point in the right lower quadrant consistent with high-grade partial small bowel obstruction.      2.  Fluid and stool throughout the colon.      3.  Postoperative changes in the proximal and distal colon.      4.  No free air or abscess.          Assessment and Plan:     * SBO (small bowel obstruction) (HCC)- (present on admission)  Assessment & Plan  -Patient presented with sudden onset, crampy, 10/10, abdominal pain with nausea and vomiting from yesterday night; CBC showed WBC of 10.6 with neutrophils of 9.4; electrolytes, BUN and creatinine were WNL; CT abdomen pelvis showed subtotal dilation of small bowel consistent with high-grade partial small bowel obstruction.    -Admitted inpatient for further management  -VS are stable; no leukocytosis or electrolyte abnormalities  -Electrolyte replacement  -IV Zofran PRN for nausea and vomiting  -On IV dilaudid 1 mg Q 2hrs PRN; will add IV toradol 15 mg Q 6 hrs PRN  -oral  contrast filled colon in <3 hrs yesterday indicating no obstruction  -General surgery recommended medical management; ordered milk and molasses enema; appreciate  recommendations  -NG clamping and trial of clear liquid today      Chronic narcotic dependence (HCC)- (present on admission)  Assessment & Plan  -Per patient she has been on opiate medication for chronic abdominal pain for the past many years  -Currently on Roxicodone 10 mg 6 times a day and Dilaudid tab 2 mg twice daily at home      Neurogenic bladder-straight cath - (present on admission)  Assessment & Plan  -Patient has been having neurogenic bladder and doing straight cath 3 times a day since 1998  -Takes nitrofurantoin 50 mg for 1 to 2 days when urine looks dirty  -She took nitrofurantoin for 1 day 3 days ago

## 2020-02-06 NOTE — CARE PLAN
Problem: Communication  Goal: The ability to communicate needs accurately and effectively will improve  Outcome: PROGRESSING AS EXPECTED  Note:   Patient able to communicate needs. All questions answered at this time.      Problem: Safety  Goal: Will remain free from falls  Outcome: PROGRESSING AS EXPECTED  Note:   Educated on fall risk and ensured fall precautions in place. Bed in lowest position, treaded socks in place, call light in reach, bed alarm in place, room free of clutter.      Problem: Infection  Goal: Will remain free from infection  Outcome: PROGRESSING AS EXPECTED  Note:   Standard precautions in place. Cleaned hands before and after patient care to prevent the spread of germs.

## 2020-02-06 NOTE — PROGRESS NOTES
Trauma / Surgical Daily Progress Note    Date of Service  2/6/2020    Chief Complaint  44 y.o. female admitted 2/3/2020 with possible small bowel obstruction (HCC) / constipation    Interval Events    NGT clamped, tolerating clear liquid diet  Several large formed bowel movements yesterday after enema  Abdomen soft, tenderness resolved  Abdominal x-ray shows resolution of small bowel dilation, contrast thru colon, decrease stool  Continue management per medicine team  No surgical needs     Review of Systems  Review of Systems   Constitutional: Negative for fever.   Respiratory: Negative for shortness of breath.    Gastrointestinal: Positive for abdominal pain (RLQ, mild/improved). Negative for nausea and vomiting.         2/5        Vital Signs  Temp:  [36.1 °C (97 °F)-36.7 °C (98 °F)] 36.6 °C (97.8 °F)  Pulse:  [61-76] 61  Resp:  [15-18] 15  BP: (157-168)/() 162/109  SpO2:  [96 %-97 %] 96 %    Physical Exam  Physical Exam  Vitals signs and nursing note reviewed.   HENT:      Nose:      Comments: NGT clamped  Cardiovascular:      Rate and Rhythm: Normal rate.   Pulmonary:      Effort: Pulmonary effort is normal.   Abdominal:      General: Bowel sounds are normal. There is no distension.      Palpations: Abdomen is soft.      Tenderness: There is no tenderness (RLQ mild, improved). There is no guarding or rebound.   Musculoskeletal: Normal range of motion.   Skin:     General: Skin is warm.   Neurological:      General: No focal deficit present.      Mental Status: She is alert.         Laboratory  Recent Results (from the past 24 hour(s))   CBC WITH DIFFERENTIAL    Collection Time: 02/06/20  2:19 AM   Result Value Ref Range    WBC 4.8 4.8 - 10.8 K/uL    RBC 4.43 4.20 - 5.40 M/uL    Hemoglobin 13.4 12.0 - 16.0 g/dL    Hematocrit 40.2 37.0 - 47.0 %    MCV 90.7 81.4 - 97.8 fL    MCH 30.2 27.0 - 33.0 pg    MCHC 33.3 (L) 33.6 - 35.0 g/dL    RDW 41.0 35.9 - 50.0 fL    Platelet Count 185 164 - 446 K/uL    MPV 10.0  9.0 - 12.9 fL    Neutrophils-Polys 47.90 44.00 - 72.00 %    Lymphocytes 36.60 22.00 - 41.00 %    Monocytes 10.50 0.00 - 13.40 %    Eosinophils 4.20 0.00 - 6.90 %    Basophils 0.60 0.00 - 1.80 %    Immature Granulocytes 0.20 0.00 - 0.90 %    Nucleated RBC 0.00 /100 WBC    Neutrophils (Absolute) 2.28 2.00 - 7.15 K/uL    Lymphs (Absolute) 1.74 1.00 - 4.80 K/uL    Monos (Absolute) 0.50 0.00 - 0.85 K/uL    Eos (Absolute) 0.20 0.00 - 0.51 K/uL    Baso (Absolute) 0.03 0.00 - 0.12 K/uL    Immature Granulocytes (abs) 0.01 0.00 - 0.11 K/uL    NRBC (Absolute) 0.00 K/uL   Comp Metabolic Panel    Collection Time: 02/06/20  2:19 AM   Result Value Ref Range    Sodium 136 135 - 145 mmol/L    Potassium 4.0 3.6 - 5.5 mmol/L    Chloride 105 96 - 112 mmol/L    Co2 23 20 - 33 mmol/L    Anion Gap 8.0 0.0 - 11.9    Glucose 94 65 - 99 mg/dL    Bun 9 8 - 22 mg/dL    Creatinine 0.63 0.50 - 1.40 mg/dL    Calcium 8.7 8.5 - 10.5 mg/dL    AST(SGOT) 17 12 - 45 U/L    ALT(SGPT) 8 2 - 50 U/L    Alkaline Phosphatase 45 30 - 99 U/L    Total Bilirubin 0.6 0.1 - 1.5 mg/dL    Albumin 4.0 3.2 - 4.9 g/dL    Total Protein 6.9 6.0 - 8.2 g/dL    Globulin 2.9 1.9 - 3.5 g/dL    A-G Ratio 1.4 g/dL   MAGNESIUM    Collection Time: 02/06/20  2:19 AM   Result Value Ref Range    Magnesium 1.8 1.5 - 2.5 mg/dL   ESTIMATED GFR    Collection Time: 02/06/20  2:19 AM   Result Value Ref Range    GFR If African American >60 >60 mL/min/1.73 m 2    GFR If Non African American >60 >60 mL/min/1.73 m 2       Fluids  No intake or output data in the 24 hours ending 02/06/20 1142    Core Measures & Quality Metrics  Labs reviewed and Medications reviewed  Ramirez catheter: No Ramirez      DVT Prophylaxis: Enoxaparin (Lovenox)    Ulcer prophylaxis: Yes    Assessed for rehab: Patient returned to prior level of function, rehabilitation not indicated at this time    RAP Score Total: 0    ETOH Screening    Assessment/Plan  * SBO (small bowel obstruction) (HCC)- (present on  admission)  Assessment & Plan  2/3 Subtotal dilatation of the small bowel with possible transition point in the right lower quadrant consistent with high-grade partial small bowel obstruction.  NG to LCWS  2/4 oral contrast filling colon in ~ 3 hr  2/5 NGT clamped, enema with multiple formed BMs, clear liquid diet  2/6 KUB with improvement in gas pattern and marked decrease stool / regular diet    Neurogenic bladder-straight cath - (present on admission)  Assessment & Plan  Self caths      Discussed patient condition with RN, Patient and general surgery. Dr. Cuellar.    Patient seen, data reviewed and discussed.  Agree with assessment and plan.  RONALDO

## 2020-02-07 ENCOUNTER — PATIENT OUTREACH (OUTPATIENT)
Dept: HEALTH INFORMATION MANAGEMENT | Facility: OTHER | Age: 45
End: 2020-02-07

## 2020-02-10 NOTE — PROGRESS NOTES
2/7 CHW Yao spoke to pt via telephone. Pt states she is doing fine and has all her medications. Pt expressed she appreciates CHW's assistance and her  [facility] at the hospital. Pt asked CHW to tell the RN Case Manager thank you. CHW sent RN case manager a message. Pt expressed she did not need anything else.     Outcome: pt is doing well and will be d/c from CCM services 2/10.

## 2021-05-02 NOTE — PROGRESS NOTES
Trauma / Surgical Daily Progress Note    Date of Service  2/5/2020    Chief Complaint  44 y.o. female admitted 2/3/2020 with possible Small bowel obstruction (HCC)    Interval Events    Abdominal pain improved but still present in RLQ  Denies nausea but NG remains to suction   Passing flatus, no BM  Contrast fills colon within 3 hours - no obstruction  Colon on admission CT with large quantities of stool throughout  Continue non-operative management  Milk and molasses enema ordered  Diet/NGT management per medicine team    Review of Systems  Review of Systems   Constitutional: Negative for fever.   Respiratory: Negative for shortness of breath.    Gastrointestinal: Positive for abdominal pain (RLQ, mild). Negative for nausea.        Vital Signs  Temp:  [36.3 °C (97.4 °F)-36.9 °C (98.4 °F)] 36.7 °C (98 °F)  Pulse:  [60-77] 60  Resp:  [15-17] 15  BP: (134-166)/() 142/84  SpO2:  [93 %-97 %] 93 %    Physical Exam  Physical Exam  HENT:      Nose:      Comments: NGT to low continuous suction  Cardiovascular:      Rate and Rhythm: Normal rate.   Pulmonary:      Effort: Pulmonary effort is normal.   Abdominal:      General: Bowel sounds are normal.      Palpations: Abdomen is soft.      Tenderness: There is tenderness (RLQ mild).   Musculoskeletal: Normal range of motion.   Skin:     General: Skin is warm.   Neurological:      General: No focal deficit present.      Mental Status: She is alert.         Laboratory  Recent Results (from the past 24 hour(s))   MAGNESIUM    Collection Time: 02/04/20  1:45 PM   Result Value Ref Range    Magnesium 1.9 1.5 - 2.5 mg/dL   CBC WITH DIFFERENTIAL    Collection Time: 02/05/20 12:29 AM   Result Value Ref Range    WBC 5.5 4.8 - 10.8 K/uL    RBC 4.41 4.20 - 5.40 M/uL    Hemoglobin 13.2 12.0 - 16.0 g/dL    Hematocrit 40.9 37.0 - 47.0 %    MCV 92.7 81.4 - 97.8 fL    MCH 29.9 27.0 - 33.0 pg    MCHC 32.3 (L) 33.6 - 35.0 g/dL    RDW 43.5 35.9 - 50.0 fL    Platelet Count 188 164 - 446 K/uL     MPV 10.2 9.0 - 12.9 fL    Neutrophils-Polys 61.40 44.00 - 72.00 %    Lymphocytes 29.00 22.00 - 41.00 %    Monocytes 8.20 0.00 - 13.40 %    Eosinophils 0.70 0.00 - 6.90 %    Basophils 0.50 0.00 - 1.80 %    Immature Granulocytes 0.20 0.00 - 0.90 %    Nucleated RBC 0.00 /100 WBC    Neutrophils (Absolute) 3.38 2.00 - 7.15 K/uL    Lymphs (Absolute) 1.60 1.00 - 4.80 K/uL    Monos (Absolute) 0.45 0.00 - 0.85 K/uL    Eos (Absolute) 0.04 0.00 - 0.51 K/uL    Baso (Absolute) 0.03 0.00 - 0.12 K/uL    Immature Granulocytes (abs) 0.01 0.00 - 0.11 K/uL    NRBC (Absolute) 0.00 K/uL   Basic Metabolic Panel    Collection Time: 02/05/20 12:29 AM   Result Value Ref Range    Sodium 138 135 - 145 mmol/L    Potassium 4.1 3.6 - 5.5 mmol/L    Chloride 105 96 - 112 mmol/L    Co2 23 20 - 33 mmol/L    Glucose 76 65 - 99 mg/dL    Bun 12 8 - 22 mg/dL    Creatinine 0.63 0.50 - 1.40 mg/dL    Calcium 8.8 8.5 - 10.5 mg/dL    Anion Gap 10.0 0.0 - 11.9   MAGNESIUM    Collection Time: 02/05/20 12:29 AM   Result Value Ref Range    Magnesium 1.9 1.5 - 2.5 mg/dL   ESTIMATED GFR    Collection Time: 02/05/20 12:29 AM   Result Value Ref Range    GFR If African American >60 >60 mL/min/1.73 m 2    GFR If Non African American >60 >60 mL/min/1.73 m 2       Fluids    Intake/Output Summary (Last 24 hours) at 2/5/2020 1159  Last data filed at 2/5/2020 0610  Gross per 24 hour   Intake --   Output 1050 ml   Net -1050 ml       Core Measures & Quality Metrics  Labs reviewed and Medications reviewed  Ramirez catheter: No Ramirez      DVT Prophylaxis: Enoxaparin (Lovenox)    Ulcer prophylaxis: Yes    Assessed for rehab: Patient returned to prior level of function, rehabilitation not indicated at this time    RAP Score Total: 0    ETOH Screening    Assessment/Plan  * SBO (small bowel obstruction) (HCC)- (present on admission)  Assessment & Plan  2/3 Subtotal dilatation of the small bowel with possible transition point in the right lower quadrant consistent with high-grade  partial small bowel obstruction.  NG to LCWS  2/4 oral contrast filling colon in ~ 3 hr    Neurogenic bladder-straight cath - (present on admission)  Assessment & Plan  Self caths        Discussed patient condition with RN, Patient and general surgery. Dr. Cuellar.    Patient seen, data reviewed and discussed.  Agree with assessment and plan.  RONALDO             02-May-2021 08:16

## 2021-07-14 ENCOUNTER — HOSPITAL ENCOUNTER (INPATIENT)
Facility: MEDICAL CENTER | Age: 46
LOS: 3 days | DRG: 389 | End: 2021-07-17
Attending: EMERGENCY MEDICINE | Admitting: HOSPITALIST
Payer: MEDICAID

## 2021-07-14 ENCOUNTER — APPOINTMENT (OUTPATIENT)
Dept: RADIOLOGY | Facility: MEDICAL CENTER | Age: 46
DRG: 389 | End: 2021-07-14
Attending: HOSPITALIST
Payer: MEDICAID

## 2021-07-14 ENCOUNTER — APPOINTMENT (OUTPATIENT)
Dept: RADIOLOGY | Facility: MEDICAL CENTER | Age: 46
DRG: 389 | End: 2021-07-14
Attending: EMERGENCY MEDICINE
Payer: MEDICAID

## 2021-07-14 DIAGNOSIS — K56.609 SMALL BOWEL OBSTRUCTION (HCC): ICD-10-CM

## 2021-07-14 LAB
ALBUMIN SERPL BCP-MCNC: 4 G/DL (ref 3.2–4.9)
ALBUMIN/GLOB SERPL: 1.3 G/DL
ALP SERPL-CCNC: 68 U/L (ref 30–99)
ALT SERPL-CCNC: 60 U/L (ref 2–50)
ANION GAP SERPL CALC-SCNC: 9 MMOL/L (ref 7–16)
APPEARANCE UR: CLEAR
AST SERPL-CCNC: 41 U/L (ref 12–45)
BASOPHILS # BLD AUTO: 0.4 % (ref 0–1.8)
BASOPHILS # BLD: 0.05 K/UL (ref 0–0.12)
BILIRUB SERPL-MCNC: 0.4 MG/DL (ref 0.1–1.5)
BILIRUB UR QL STRIP.AUTO: NEGATIVE
BUN SERPL-MCNC: 5 MG/DL (ref 8–22)
CALCIUM SERPL-MCNC: 8.5 MG/DL (ref 8.5–10.5)
CHLORIDE SERPL-SCNC: 108 MMOL/L (ref 96–112)
CO2 SERPL-SCNC: 22 MMOL/L (ref 20–33)
COLOR UR: YELLOW
CREAT SERPL-MCNC: 0.6 MG/DL (ref 0.5–1.4)
EOSINOPHIL # BLD AUTO: 0.11 K/UL (ref 0–0.51)
EOSINOPHIL NFR BLD: 0.8 % (ref 0–6.9)
ERYTHROCYTE [DISTWIDTH] IN BLOOD BY AUTOMATED COUNT: 44.4 FL (ref 35.9–50)
GLOBULIN SER CALC-MCNC: 3 G/DL (ref 1.9–3.5)
GLUCOSE SERPL-MCNC: 112 MG/DL (ref 65–99)
GLUCOSE UR STRIP.AUTO-MCNC: NEGATIVE MG/DL
HCT VFR BLD AUTO: 45.5 % (ref 37–47)
HGB BLD-MCNC: 14.7 G/DL (ref 12–16)
IMM GRANULOCYTES # BLD AUTO: 0.06 K/UL (ref 0–0.11)
IMM GRANULOCYTES NFR BLD AUTO: 0.4 % (ref 0–0.9)
KETONES UR STRIP.AUTO-MCNC: NEGATIVE MG/DL
LEUKOCYTE ESTERASE UR QL STRIP.AUTO: NEGATIVE
LIPASE SERPL-CCNC: 39 U/L (ref 11–82)
LYMPHOCYTES # BLD AUTO: 0.79 K/UL (ref 1–4.8)
LYMPHOCYTES NFR BLD: 5.8 % (ref 22–41)
MAGNESIUM SERPL-MCNC: 2 MG/DL (ref 1.5–2.5)
MCH RBC QN AUTO: 29.8 PG (ref 27–33)
MCHC RBC AUTO-ENTMCNC: 32.3 G/DL (ref 33.6–35)
MCV RBC AUTO: 92.3 FL (ref 81.4–97.8)
MICRO URNS: NORMAL
MONOCYTES # BLD AUTO: 1.26 K/UL (ref 0–0.85)
MONOCYTES NFR BLD AUTO: 9.3 % (ref 0–13.4)
NEUTROPHILS # BLD AUTO: 11.31 K/UL (ref 2–7.15)
NEUTROPHILS NFR BLD: 83.3 % (ref 44–72)
NITRITE UR QL STRIP.AUTO: NEGATIVE
NRBC # BLD AUTO: 0 K/UL
NRBC BLD-RTO: 0 /100 WBC
PH UR STRIP.AUTO: 6 [PH] (ref 5–8)
PLATELET # BLD AUTO: 243 K/UL (ref 164–446)
PMV BLD AUTO: 10.9 FL (ref 9–12.9)
POTASSIUM SERPL-SCNC: 4.5 MMOL/L (ref 3.6–5.5)
PROT SERPL-MCNC: 7 G/DL (ref 6–8.2)
PROT UR QL STRIP: NEGATIVE MG/DL
RBC # BLD AUTO: 4.93 M/UL (ref 4.2–5.4)
RBC UR QL AUTO: NEGATIVE
SODIUM SERPL-SCNC: 139 MMOL/L (ref 135–145)
SP GR UR STRIP.AUTO: 1.01
UROBILINOGEN UR STRIP.AUTO-MCNC: 0.2 MG/DL
WBC # BLD AUTO: 13.6 K/UL (ref 4.8–10.8)

## 2021-07-14 PROCEDURE — 96375 TX/PRO/DX INJ NEW DRUG ADDON: CPT

## 2021-07-14 PROCEDURE — 36415 COLL VENOUS BLD VENIPUNCTURE: CPT

## 2021-07-14 PROCEDURE — 74177 CT ABD & PELVIS W/CONTRAST: CPT

## 2021-07-14 PROCEDURE — 96376 TX/PRO/DX INJ SAME DRUG ADON: CPT

## 2021-07-14 PROCEDURE — 700105 HCHG RX REV CODE 258: Performed by: HOSPITALIST

## 2021-07-14 PROCEDURE — 76937 US GUIDE VASCULAR ACCESS: CPT

## 2021-07-14 PROCEDURE — 96374 THER/PROPH/DIAG INJ IV PUSH: CPT

## 2021-07-14 PROCEDURE — 700111 HCHG RX REV CODE 636 W/ 250 OVERRIDE (IP): Performed by: EMERGENCY MEDICINE

## 2021-07-14 PROCEDURE — 83735 ASSAY OF MAGNESIUM: CPT

## 2021-07-14 PROCEDURE — 05HB33Z INSERTION OF INFUSION DEVICE INTO RIGHT BASILIC VEIN, PERCUTANEOUS APPROACH: ICD-10-PCS | Performed by: HOSPITALIST

## 2021-07-14 PROCEDURE — 85025 COMPLETE CBC W/AUTO DIFF WBC: CPT

## 2021-07-14 PROCEDURE — 80053 COMPREHEN METABOLIC PANEL: CPT

## 2021-07-14 PROCEDURE — 99285 EMERGENCY DEPT VISIT HI MDM: CPT

## 2021-07-14 PROCEDURE — A9270 NON-COVERED ITEM OR SERVICE: HCPCS | Performed by: HOSPITALIST

## 2021-07-14 PROCEDURE — 99223 1ST HOSP IP/OBS HIGH 75: CPT | Performed by: HOSPITALIST

## 2021-07-14 PROCEDURE — 83690 ASSAY OF LIPASE: CPT

## 2021-07-14 PROCEDURE — 81003 URINALYSIS AUTO W/O SCOPE: CPT

## 2021-07-14 PROCEDURE — 700105 HCHG RX REV CODE 258: Performed by: EMERGENCY MEDICINE

## 2021-07-14 PROCEDURE — 700102 HCHG RX REV CODE 250 W/ 637 OVERRIDE(OP): Performed by: HOSPITALIST

## 2021-07-14 PROCEDURE — 700117 HCHG RX CONTRAST REV CODE 255: Performed by: EMERGENCY MEDICINE

## 2021-07-14 PROCEDURE — 700111 HCHG RX REV CODE 636 W/ 250 OVERRIDE (IP): Performed by: HOSPITALIST

## 2021-07-14 PROCEDURE — 770006 HCHG ROOM/CARE - MED/SURG/GYN SEMI*

## 2021-07-14 RX ORDER — SERTRALINE HYDROCHLORIDE 100 MG/1
100 TABLET, FILM COATED ORAL
Status: DISCONTINUED | OUTPATIENT
Start: 2021-07-14 | End: 2021-07-17 | Stop reason: HOSPADM

## 2021-07-14 RX ORDER — SODIUM CHLORIDE, SODIUM LACTATE, POTASSIUM CHLORIDE, CALCIUM CHLORIDE 600; 310; 30; 20 MG/100ML; MG/100ML; MG/100ML; MG/100ML
1000 INJECTION, SOLUTION INTRAVENOUS ONCE
Status: COMPLETED | OUTPATIENT
Start: 2021-07-14 | End: 2021-07-14

## 2021-07-14 RX ORDER — HYDROMORPHONE HYDROCHLORIDE 1 MG/ML
1 INJECTION, SOLUTION INTRAMUSCULAR; INTRAVENOUS; SUBCUTANEOUS
Status: DISCONTINUED | OUTPATIENT
Start: 2021-07-14 | End: 2021-07-14

## 2021-07-14 RX ORDER — SODIUM CHLORIDE, SODIUM LACTATE, POTASSIUM CHLORIDE, CALCIUM CHLORIDE 600; 310; 30; 20 MG/100ML; MG/100ML; MG/100ML; MG/100ML
1000 INJECTION, SOLUTION INTRAVENOUS ONCE
Status: CANCELLED | OUTPATIENT
Start: 2021-07-14 | End: 2021-07-14

## 2021-07-14 RX ORDER — MIRTAZAPINE 15 MG/1
7.5 TABLET, ORALLY DISINTEGRATING ORAL
COMMUNITY

## 2021-07-14 RX ORDER — POLYETHYLENE GLYCOL 3350 17 G/17G
1 POWDER, FOR SOLUTION ORAL
Status: DISCONTINUED | OUTPATIENT
Start: 2021-07-14 | End: 2021-07-16

## 2021-07-14 RX ORDER — ONDANSETRON 4 MG/1
4 TABLET, ORALLY DISINTEGRATING ORAL EVERY 4 HOURS PRN
Status: DISCONTINUED | OUTPATIENT
Start: 2021-07-14 | End: 2021-07-17 | Stop reason: HOSPADM

## 2021-07-14 RX ORDER — AMOXICILLIN 250 MG
2 CAPSULE ORAL 2 TIMES DAILY
Status: DISCONTINUED | OUTPATIENT
Start: 2021-07-14 | End: 2021-07-16

## 2021-07-14 RX ORDER — PROMETHAZINE HYDROCHLORIDE 25 MG/1
12.5-25 TABLET ORAL EVERY 4 HOURS PRN
Status: DISCONTINUED | OUTPATIENT
Start: 2021-07-14 | End: 2021-07-15

## 2021-07-14 RX ORDER — ACETAMINOPHEN 650 MG/1
975 SUPPOSITORY RECTAL EVERY 6 HOURS
Status: DISCONTINUED | OUTPATIENT
Start: 2021-07-14 | End: 2021-07-15

## 2021-07-14 RX ORDER — HYDROMORPHONE HYDROCHLORIDE 1 MG/ML
0.5 INJECTION, SOLUTION INTRAMUSCULAR; INTRAVENOUS; SUBCUTANEOUS ONCE
Status: COMPLETED | OUTPATIENT
Start: 2021-07-14 | End: 2021-07-14

## 2021-07-14 RX ORDER — ONDANSETRON 2 MG/ML
4 INJECTION INTRAMUSCULAR; INTRAVENOUS ONCE
Status: COMPLETED | OUTPATIENT
Start: 2021-07-14 | End: 2021-07-14

## 2021-07-14 RX ORDER — SERTRALINE HYDROCHLORIDE 20 MG/ML
100 SOLUTION ORAL
COMMUNITY

## 2021-07-14 RX ORDER — PROCHLORPERAZINE EDISYLATE 5 MG/ML
5-10 INJECTION INTRAMUSCULAR; INTRAVENOUS EVERY 4 HOURS PRN
Status: DISCONTINUED | OUTPATIENT
Start: 2021-07-14 | End: 2021-07-15

## 2021-07-14 RX ORDER — ACETAMINOPHEN 325 MG/1
650 TABLET ORAL EVERY 6 HOURS PRN
Status: DISCONTINUED | OUTPATIENT
Start: 2021-07-14 | End: 2021-07-17 | Stop reason: HOSPADM

## 2021-07-14 RX ORDER — BISACODYL 10 MG
10 SUPPOSITORY, RECTAL RECTAL
Status: DISCONTINUED | OUTPATIENT
Start: 2021-07-14 | End: 2021-07-16

## 2021-07-14 RX ORDER — ACETAMINOPHEN 650 MG/1
975 SUPPOSITORY RECTAL EVERY 6 HOURS PRN
Status: DISCONTINUED | OUTPATIENT
Start: 2021-07-19 | End: 2021-07-15

## 2021-07-14 RX ORDER — ONDANSETRON 2 MG/ML
4 INJECTION INTRAMUSCULAR; INTRAVENOUS EVERY 4 HOURS PRN
Status: DISCONTINUED | OUTPATIENT
Start: 2021-07-14 | End: 2021-07-17 | Stop reason: HOSPADM

## 2021-07-14 RX ORDER — SODIUM CHLORIDE, SODIUM LACTATE, POTASSIUM CHLORIDE, CALCIUM CHLORIDE 600; 310; 30; 20 MG/100ML; MG/100ML; MG/100ML; MG/100ML
INJECTION, SOLUTION INTRAVENOUS CONTINUOUS
Status: DISCONTINUED | OUTPATIENT
Start: 2021-07-14 | End: 2021-07-17

## 2021-07-14 RX ORDER — MIDAZOLAM HYDROCHLORIDE 1 MG/ML
2 INJECTION INTRAMUSCULAR; INTRAVENOUS ONCE
Status: COMPLETED | OUTPATIENT
Start: 2021-07-14 | End: 2021-07-14

## 2021-07-14 RX ORDER — PROMETHAZINE HYDROCHLORIDE 25 MG/1
12.5-25 SUPPOSITORY RECTAL EVERY 4 HOURS PRN
Status: DISCONTINUED | OUTPATIENT
Start: 2021-07-14 | End: 2021-07-15

## 2021-07-14 RX ADMIN — SODIUM CHLORIDE, POTASSIUM CHLORIDE, SODIUM LACTATE AND CALCIUM CHLORIDE: 600; 310; 30; 20 INJECTION, SOLUTION INTRAVENOUS at 14:48

## 2021-07-14 RX ADMIN — HYDROMORPHONE HYDROCHLORIDE 1 MG: 1 INJECTION, SOLUTION INTRAMUSCULAR; INTRAVENOUS; SUBCUTANEOUS at 12:16

## 2021-07-14 RX ADMIN — HYDROMORPHONE HYDROCHLORIDE 1 MG: 1 INJECTION, SOLUTION INTRAMUSCULAR; INTRAVENOUS; SUBCUTANEOUS at 08:53

## 2021-07-14 RX ADMIN — IOHEXOL 25 ML: 240 INJECTION, SOLUTION INTRATHECAL; INTRAVASCULAR; INTRAVENOUS; ORAL at 10:00

## 2021-07-14 RX ADMIN — MIDAZOLAM HYDROCHLORIDE 2 MG: 1 INJECTION, SOLUTION INTRAMUSCULAR; INTRAVENOUS at 12:16

## 2021-07-14 RX ADMIN — IOHEXOL 100 ML: 350 INJECTION, SOLUTION INTRAVENOUS at 09:33

## 2021-07-14 RX ADMIN — HYDROMORPHONE HYDROCHLORIDE 1 MG: 1 INJECTION, SOLUTION INTRAMUSCULAR; INTRAVENOUS; SUBCUTANEOUS at 07:20

## 2021-07-14 RX ADMIN — HYDROMORPHONE HYDROCHLORIDE 1 MG: 1 INJECTION, SOLUTION INTRAMUSCULAR; INTRAVENOUS; SUBCUTANEOUS at 07:56

## 2021-07-14 RX ADMIN — ONDANSETRON 4 MG: 2 INJECTION INTRAMUSCULAR; INTRAVENOUS at 17:45

## 2021-07-14 RX ADMIN — HYDROMORPHONE HYDROCHLORIDE 1 MG: 1 INJECTION, SOLUTION INTRAMUSCULAR; INTRAVENOUS; SUBCUTANEOUS at 13:25

## 2021-07-14 RX ADMIN — ONDANSETRON 4 MG: 2 INJECTION INTRAMUSCULAR; INTRAVENOUS at 13:25

## 2021-07-14 RX ADMIN — HYDROMORPHONE HYDROCHLORIDE 1 MG: 1 INJECTION, SOLUTION INTRAMUSCULAR; INTRAVENOUS; SUBCUTANEOUS at 09:36

## 2021-07-14 RX ADMIN — SODIUM CHLORIDE, POTASSIUM CHLORIDE, SODIUM LACTATE AND CALCIUM CHLORIDE: 600; 310; 30; 20 INJECTION, SOLUTION INTRAVENOUS at 23:43

## 2021-07-14 RX ADMIN — SODIUM CHLORIDE, POTASSIUM CHLORIDE, SODIUM LACTATE AND CALCIUM CHLORIDE 1000 ML: 600; 310; 30; 20 INJECTION, SOLUTION INTRAVENOUS at 07:25

## 2021-07-14 RX ADMIN — HYDROMORPHONE HYDROCHLORIDE 1 MG: 1 INJECTION, SOLUTION INTRAMUSCULAR; INTRAVENOUS; SUBCUTANEOUS at 10:57

## 2021-07-14 RX ADMIN — Medication: at 14:51

## 2021-07-14 RX ADMIN — ONDANSETRON 4 MG: 2 INJECTION INTRAMUSCULAR; INTRAVENOUS at 07:19

## 2021-07-14 RX ADMIN — ONDANSETRON 4 MG: 2 INJECTION INTRAMUSCULAR; INTRAVENOUS at 23:47

## 2021-07-14 RX ADMIN — ACETAMINOPHEN 975 MG: 650 SUPPOSITORY RECTAL at 17:16

## 2021-07-14 RX ADMIN — HYDROMORPHONE HYDROCHLORIDE 0.5 MG: 1 INJECTION, SOLUTION INTRAMUSCULAR; INTRAVENOUS; SUBCUTANEOUS at 15:01

## 2021-07-14 ASSESSMENT — ENCOUNTER SYMPTOMS
LOSS OF CONSCIOUSNESS: 0
HEADACHES: 0
COUGH: 0
DIARRHEA: 0
VOMITING: 1
BLURRED VISION: 0
ABDOMINAL PAIN: 1
SORE THROAT: 0
BACK PAIN: 0
DOUBLE VISION: 0
FEVER: 0
DIZZINESS: 0
PALPITATIONS: 0
NAUSEA: 1
SHORTNESS OF BREATH: 0
CHILLS: 0

## 2021-07-14 ASSESSMENT — LIFESTYLE VARIABLES
TOTAL SCORE: 0
EVER FELT BAD OR GUILTY ABOUT YOUR DRINKING: NO
CONSUMPTION TOTAL: INCOMPLETE
ALCOHOL_USE: NO
TOTAL SCORE: 0
HAVE YOU EVER FELT YOU SHOULD CUT DOWN ON YOUR DRINKING: NO
EVER HAD A DRINK FIRST THING IN THE MORNING TO STEADY YOUR NERVES TO GET RID OF A HANGOVER: NO
TOTAL SCORE: 0
DO YOU DRINK ALCOHOL: NO
HAVE PEOPLE ANNOYED YOU BY CRITICIZING YOUR DRINKING: NO

## 2021-07-14 ASSESSMENT — PAIN DESCRIPTION - PAIN TYPE
TYPE: ACUTE PAIN

## 2021-07-14 ASSESSMENT — PATIENT HEALTH QUESTIONNAIRE - PHQ9
6. FEELING BAD ABOUT YOURSELF - OR THAT YOU ARE A FAILURE OR HAVE LET YOURSELF OR YOUR FAMILY DOWN: MORE THAN HALF THE DAYS
3. TROUBLE FALLING OR STAYING ASLEEP OR SLEEPING TOO MUCH: NEARLY EVERY DAY
1. LITTLE INTEREST OR PLEASURE IN DOING THINGS: NEARLY EVERY DAY
7. TROUBLE CONCENTRATING ON THINGS, SUCH AS READING THE NEWSPAPER OR WATCHING TELEVISION: MORE THAN HALF THE DAYS
5. POOR APPETITE OR OVEREATING: NOT AT ALL
9. THOUGHTS THAT YOU WOULD BE BETTER OFF DEAD, OR OF HURTING YOURSELF: SEVERAL DAYS
SUM OF ALL RESPONSES TO PHQ QUESTIONS 1-9: 19
8. MOVING OR SPEAKING SO SLOWLY THAT OTHER PEOPLE COULD HAVE NOTICED. OR THE OPPOSITE, BEING SO FIGETY OR RESTLESS THAT YOU HAVE BEEN MOVING AROUND A LOT MORE THAN USUAL: MORE THAN HALF THE DAYS
2. FEELING DOWN, DEPRESSED, IRRITABLE, OR HOPELESS: NEARLY EVERY DAY
4. FEELING TIRED OR HAVING LITTLE ENERGY: NEARLY EVERY DAY
SUM OF ALL RESPONSES TO PHQ9 QUESTIONS 1 AND 2: 6

## 2021-07-14 ASSESSMENT — COGNITIVE AND FUNCTIONAL STATUS - GENERAL
MOBILITY SCORE: 24
SUGGESTED CMS G CODE MODIFIER MOBILITY: CH

## 2021-07-14 ASSESSMENT — FIBROSIS 4 INDEX
FIB4 SCORE: 1.49
FIB4 SCORE: 1

## 2021-07-14 NOTE — ASSESSMENT & PLAN NOTE
Patient takes p.o. Dilaudid at home  We will have her on a PCA while she is in-house and transition to p.o. medications once she has return of bowel function

## 2021-07-14 NOTE — PROGRESS NOTES
4 Eyes Skin Assessment Completed by Galina SAUCEDA , RN and RAMAN Avila.    Head WDL  Ears WDL  Nose WDL  Mouth WDL  Neck WDL  Breast/Chest WDL  Shoulder Blades WDL  Spine WDL  (R) Arm/Elbow/Hand WDL  (L) Arm/Elbow/Hand WDL  Abdomen WDL  Groin WDL  Scrotum/Coccyx/Buttocks WDL  (R) Leg edema  (L) Leg Edema  (R) Heel/Foot/Toe WDL  (L) Heel/Foot/Toe WDL          Devices In Places OG/NG      Interventions In Place N/A    Possible Skin Injury No    Pictures Uploaded Into Epic N/A  Wound Consult Placed N/A  RN Wound Prevention Protocol Ordered No

## 2021-07-14 NOTE — H&P
Hospital Medicine History & Physical Note    Date of Service  7/14/2021    Primary Care Physician  Marcelina Green M.D. (Inactive)    Consultants      Specialist Names:     Code Status  Full Code    Chief Complaint  Chief Complaint   Patient presents with   • Abdominal Pain   • Nausea/Vomiting/Diarrhea       History of Presenting Illness  Corazon Berg is a 45 y.o. female with a complex surgical history.  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  She has had reversal of her ostomy back in 2013.  She is followed by Dr Verma and Dr Metcalf.  She has had at least for small bowel obstructions that she can recall since 2015.  The most recent was earlier this year, and similar to the previous episodes, it resolved with NGT and supportive measures.  Patient reports symptoms that began last night.  She notes vomiting that was initially what she had just eaten, and then turned into a green bile-like content.  She did not see any blood or coffee-ground emesis.  Her last bowel movement was yesterday.  She reports that she has chronic diarrhea usually 7-8 bowel movements daily, and that was the pattern that she had yesterday as well.  Since yesterday evening, she has not passed gas nor has she had any bowel movements.  Her pain is located primarily in the right side of her belly, but diffusely throughout.  It is worse with movement, and when she has a bowel movement so she has not had any of those.  She denies any fever or chills.  She is otherwise in her normal state of health.    I discussed the plan of care with patient and general surgery and GI.    Review of Systems  Review of Systems   Constitutional: Negative for chills and fever.   HENT: Negative for nosebleeds and sore throat.    Eyes: Negative for blurred vision and double vision.   Respiratory: Negative for cough and shortness of breath.    Cardiovascular:  "Negative for chest pain, palpitations and leg swelling.   Gastrointestinal: Positive for abdominal pain, nausea and vomiting. Negative for diarrhea.   Genitourinary: Negative for dysuria and urgency.   Musculoskeletal: Negative for back pain.   Skin: Negative for rash.   Neurological: Negative for dizziness, loss of consciousness and headaches.       Past Medical History   has a past medical history of Bowel perforation (HCC), Heart murmur, Kidney infection, Obstruction, Other specified disorder of intestines, Other specified symptom associated with female genital organs, Pain (3/14/13), Pneumonia (1992), Pneumothorax, PTSD (post-traumatic stress disorder), SBO (small bowel obstruction) (HCC), Sepsis(995.91) (1997&1998), Unspecified urinary incontinence, Urethra disorder, and Urinary bladder disorder.    Surgical History   has a past surgical history that includes colostomy (6/11/12); cath place groshong; recovery (1/4/2013); other (5/25/12); other abdominal surgery; appendectomy; other abdominal surgery; other abdominal surgery (6/11/2012); exploratory laparotomy (3/18/2013); lysis adhesions general (3/18/2013); colon resection (3/18/2013); colostomy takedown (3/18/2013); and bartholin gland excision (3/7/2014).     Family History  family history is not on file.   Family history reviewed with patient. There is no family history that is pertinent to the chief complaint.     Social History   reports that she has never smoked. She has never used smokeless tobacco. She reports that she does not drink alcohol and does not use drugs.    Allergies  Allergies   Allergen Reactions   • Sammy-1 [Lidocaine Hcl] Anaphylaxis     Topical or injectable, \"subq numbing anesthetics\"  \"Causes Methemoglobinemia.\"   • Pcn [Penicillins] Rash             Medications  Prior to Admission Medications   Prescriptions Last Dose Informant Patient Reported? Taking?   HYDROmorphone (DILAUDID) 2 MG Tab 7/13/2021 at 2200 Patient Yes No   Sig: Take " "2 mg by mouth 3 times a day.   NARCAN 4 MG/0.1ML Liquid PRN at PRN Patient Yes No   Sig: Spray 1 Spray in nose as needed.   clonazepam (KLONOPIN) 0.5 MG Tab 2021 at afternoon Patient Yes No   Sig: Take 0.5 mg by mouth 3 times a day as needed (Anxiety).   cyanocobalamin (VITAMIN B-12) 1000 MCG/ML SOLN 2021 at Unknown time Patient Yes No   Si,000 mcg by Intramuscular route every 7 days.   mirtazapine (REMERON) 15 MG TABLET DISPERSIBLE 2021 at 2000 Patient Yes Yes   Sig: Take 7.5 mg by mouth at bedtime as needed. 1/2 tablet = 7.5 mg.   Indications: PTSD   nitrofurantoin (MACRODANTIN) 50 MG Cap \"Couple weeks ago\" at PRN Patient No No   Sig: TAKE ONE CAPSULE BY MOUTH AS NEEDED FOR  UTI  RELATED  TO  SELF  CATHETER   oxyCODONE immediate release (ROXICODONE) 10 MG immediate release tablet 2021 at 2230 Patient Yes No   Sig: Take 10 mg by mouth every four hours as needed for Severe Pain.   sertraline (ZOLOFT) 20 MG/ML concentrated solution 2021 at 2200 Patient Yes Yes   Sig: Take 100 mg by mouth at bedtime. 5 mL = 100 mg.      Facility-Administered Medications: None       Physical Exam  Temp:  [37 °C (98.6 °F)] 37 °C (98.6 °F)  Pulse:  [57-81] 57  Resp:  [15-24] 17  BP: (107-143)/(56-85) 107/63  SpO2:  [91 %-94 %] 91 %    Physical Exam  Vitals reviewed.   Constitutional:       General: She is not in acute distress.     Appearance: Normal appearance. She is well-developed. She is not diaphoretic.   HENT:      Head: Normocephalic and atraumatic.   Neck:      Vascular: No JVD.   Cardiovascular:      Rate and Rhythm: Normal rate and regular rhythm.   Pulmonary:      Effort: Pulmonary effort is normal. No respiratory distress.      Breath sounds: No stridor. No wheezing or rales.   Abdominal:      General: There is distension.      Palpations: Abdomen is soft.      Tenderness: There is no abdominal tenderness. There is no guarding or rebound.      Comments: Abdomen is slightly distended, tympanic " throughout.  There is tenderness to palpation primarily on the right side but there is also tenderness on the left as well.  No guarding or rebound.  Bowel sounds are not appreciated with auscultation.   Musculoskeletal:         General: No tenderness.      Right lower leg: No edema.      Left lower leg: No edema.   Skin:     General: Skin is warm and dry.      Findings: No rash.   Neurological:      Mental Status: She is alert and oriented to person, place, and time. Mental status is at baseline.   Psychiatric:         Mood and Affect: Mood normal.         Thought Content: Thought content normal.         Laboratory:  Recent Labs     07/14/21  0610   WBC 13.6*   RBC 4.93   HEMOGLOBIN 14.7   HEMATOCRIT 45.5   MCV 92.3   MCH 29.8   MCHC 32.3*   RDW 44.4   PLATELETCT 243   MPV 10.9     Recent Labs     07/14/21  0610   SODIUM 139   POTASSIUM 4.5   CHLORIDE 108   CO2 22   GLUCOSE 112*   BUN 5*   CREATININE 0.60   CALCIUM 8.5     Recent Labs     07/14/21  0610   ALTSGPT 60*   ASTSGOT 41   ALKPHOSPHAT 68   TBILIRUBIN 0.4   LIPASE 39   GLUCOSE 112*         No results for input(s): NTPROBNP in the last 72 hours.      No results for input(s): TROPONINT in the last 72 hours.    Imaging:  DX-ABDOMEN FOR TUBE PLACEMENT   Final Result      Enteric tube projects over the stomach.      CT-ABDOMEN-PELVIS WITH   Final Result      1.  Dilated loops of small bowel compatible with small bowel obstruction.   2.  Stable hypodense lesion within the right lobe of the liver could represent a cyst or hemangioma.   3.  Postsurgical changes of the colon. Moderate amount of colonic stool.   4.  Minimal right hydronephrosis.   5.  Trace free fluid.         IR-MIDLINE CATHETER INSERTION WO GUIDANCE > AGE 3    (Results Pending)       X-Ray:  I have personally reviewed the images and compared with prior images.    Assessment/Plan:  I anticipate this patient will require at least two midnights for appropriate medical management, necessitating  inpatient admission.    Chronic narcotic dependence (HCC)- (present on admission)  Assessment & Plan  Patient takes p.o. Dilaudid at home  We will have her on a PCA while she is in-house and transition to p.o. medications once she has return of bowel function    PTSD (post-traumatic stress disorder)- (present on admission)  Assessment & Plan  Supportive measures    Neurogenic bladder-straight cath - (present on admission)  Assessment & Plan  Patient straight caths 3 times daily, we will continue that while she is in house      SBO (small bowel obstruction) (HCC)- (present on admission)  Assessment & Plan  Patient has very complex previous surgical history, and has well-known history of adhesions and previous small bowel obstructions.  I have discussed case today with her primary gastroenterologist Dr. Bradford as well as her previous general surgeon Dr. Verma.  Both are in agreement with supportive measures, NG tube decompression and conservative management.  Patient has been through this many times before and is in agreement with the treatment plan as noted above.  Admit to surgical floor  NGT placement to low wall suction  IV fluids  Pain management with PCA  Antiemetics  Follow serial labs and physical exam  Low threshold to repeat imaging      VTE prophylaxis: enoxaparin ppx

## 2021-07-14 NOTE — ED NOTES
Med rec completed per Pt at bedside.  Allergies reviewed with Pt.  Pt takes Macrodantin 50 mg as needed for UTI symptoms related to self catheterization.  Pt's pharmacy: [facility] in [place].

## 2021-07-14 NOTE — ED TRIAGE NOTES
"Chief Complaint   Patient presents with   • Abdominal Pain   • Nausea/Vomiting/Diarrhea       BIB EMS for above complaints, pt on monitor and in gown. Pt noted to have distended and semi-firm abdomen from baseline. Onset of symptoms at midnight tonight. Positive for extensive abdominal surgeries. Pt states \"this feels like the blockage feeling that I've had before\".     Medications given en route: 500 mL NS, 12.5mg Phenergan, 100 mcg fentanyl, 2 mg Morphine, and 2 mg Versed.    /85   Pulse 81   Temp 37 °C (98.6 °F) (Temporal)   Resp 20   Ht 1.651 m (5' 5\")   Wt 72.6 kg (160 lb)   SpO2 93%   BMI 26.63 kg/m²   "

## 2021-07-14 NOTE — ED PROVIDER NOTES
ED Provider Note    CHIEF COMPLAINT  Chief Complaint   Patient presents with   • Abdominal Pain   • Nausea/Vomiting/Diarrhea         HPI  Corazon Berg is a 45 y.o. female who presents with abdominal pain.  Patient has a history of chronic abdominal pain status post multiple bowel resections.  She has urinary discomfort and self catheterizes.  Her chronic abdominal pain worsened over the last 2 days.  Last night increased pain with vomiting.  This is described as projectile nonbloody turning to bile.  She still had bowel movements consisting of right Slind the food that she had eaten last night.  Her pain is mostly in the right side of the abdomen.  She does have pain in the right flank.  She has not had a fever.  No change in her urine that she is aware of with self-catheterization.  No abnormal vaginal discharge or bleeding.  Patient takes 2 mg of Dilaudid 3 times a day as well as oxycodone for chronic abdominal pain.  This pain is much worse than baseline and feels similar to previous bowel obstructions..    Patient states her surgeon is Dr. Walton.  She wants to avoid surgery at all cost.    REVIEW OF SYSTEMS  As per HPI  All other systems are negative.     PAST MEDICAL HISTORY  Past Medical History:   Diagnosis Date   • Bowel perforation ([facility])    • Heart murmur     grade 2, and grade 4   • Kidney infection    • Obstruction     reversed 3/2013   • Other specified disorder of intestines     chronic diarrhea   • Other specified symptom associated with female genital organs    • Pain 3/14/13    6/10 abdomen   • Pneumonia 1992   • Pneumothorax    • PTSD (post-traumatic stress disorder)    • SBO (small bowel obstruction) ([facility])    • Sepsis(995.91) 1997&1998   • Unspecified urinary incontinence    • Urethra disorder     self caths   • Urinary bladder disorder     self caths, freq uti, and kidney infections       FAMILY HISTORY  No family history on file.    SOCIAL HISTORY  Social History     Tobacco Use    • Smoking status: Never Smoker   • Smokeless tobacco: Never Used   Substance Use Topics   • Alcohol use: No   • Drug use: No       SURGICAL HISTORY  Past Surgical History:   Procedure Laterality Date   • BARTHOLIN GLAND EXCISION  3/7/2014    Performed by Ana Boyle M.D. at SURGERY SAME DAY [facility] ORS   • EXPLORATORY LAPAROTOMY  3/18/2013    Performed by Shawn Gutierrez M.D. at SURGERY [facility] ORS   • LYSIS ADHESIONS GENERAL  3/18/2013    Performed by Shawn Gutierrez M.D. at SURGERY [facility] ORS   • COLON RESECTION  3/18/2013    Performed by Shawn Gutierrez M.D. at SURGERY [facility] ORS   • COLOSTOMY TAKEDOWN  3/18/2013    Performed by Shawn Gutierrez M.D. at SURGERY [facility] ORS   • RECOVERY  1/4/2013    Performed by [facility] at SURGERY SAME DAY [facility] ORS   • COLOSTOMY  6/11/12   • OTHER ABDOMINAL SURGERY  6/11/2012    COLOSTOMY AND PELVIC DRAIN   • OTHER  5/25/12    lapratomy with lysis of adhesions   • APPENDECTOMY     • CATH PLACE [facility]     • OTHER ABDOMINAL SURGERY      abdominalx11-BOWEL RESECTION   • OTHER ABDOMINAL SURGERY      PARTIAL SPLENECTOMY       CURRENT MEDICATIONS  Home Medications     Reviewed by Jania Purcell RDenilsonNDenilson (Registered Nurse) on 07/14/21 at 0612  Med List Status: Not Addressed   Medication Last Dose Status   Bacillus Coagulans-Inulin (PROBIOTIC FORMULA) 1-250 BILLION-MG Cap  Active   clonazepam (KLONOPIN) 0.5 MG Tab  Active   cyanocobalamin (VITAMIN B-12) 1000 MCG/ML SOLN  Active   HYDROmorphone (DILAUDID) 2 MG Tab  Active   magnesium hydroxide (MILK OF MAGNESIA) 400 MG/5ML Suspension  Active   mirtazapine (REMERON) 15 MG Tab  Active   NARCAN 4 MG/0.1ML Liquid  Active   nitrofurantoin (MACRODANTIN) 50 MG Cap  Active   oxyCODONE immediate release (ROXICODONE) 10 MG immediate release tablet  Active                ALLERGIES  Allergies   Allergen Reactions   • Sammy-1 [Lidocaine Hcl] Anaphylaxis     Topical or  "injectable, \"subq numbing anesthetics\"  \"Causes Methemoglobinemia.\"   • Pcn [Penicillins] Rash       PHYSICAL EXAM  VITAL SIGNS: /85   Pulse 81   Temp 37 °C (98.6 °F) (Temporal)   Resp 20   Ht 1.651 m (5' 5\")   Wt 72.6 kg (160 lb)   SpO2 93%   BMI 26.63 kg/m²   Constitutional: Awake and alert  HENT: Dry mucous membranes  Eyes: Sclera white  Neck: Normal range of motion  Cardiovascular: Normal heart rate, Normal rhythm  Thorax & Lungs: Normal breath sounds, No respiratory distress, No wheezing, No chest tenderness.   Abdomen: Multiple scars.  Diffuse tenderness with increased tenderness over the right abdomen.  No remarkable tympany.  Questionable mild distention  Skin: No rash.   Extremities: Intact, symmetric distal pulses, no edema.  Neurologic: Grossly normal    RADIOLOGY/PROCEDURES  CT-ABDOMEN-PELVIS WITH   Final Result      1.  Dilated loops of small bowel compatible with small bowel obstruction.   2.  Stable hypodense lesion within the right lobe of the liver could represent a cyst or hemangioma.   3.  Postsurgical changes of the colon. Moderate amount of colonic stool.   4.  Minimal right hydronephrosis.   5.  Trace free fluid.            Imaging is interpreted by radiologist    Labs:   Results for orders placed or performed during the hospital encounter of 07/14/21   CBC WITH DIFFERENTIAL   Result Value Ref Range    WBC 13.6 (H) 4.8 - 10.8 K/uL    RBC 4.93 4.20 - 5.40 M/uL    Hemoglobin 14.7 12.0 - 16.0 g/dL    Hematocrit 45.5 37.0 - 47.0 %    MCV 92.3 81.4 - 97.8 fL    MCH 29.8 27.0 - 33.0 pg    MCHC 32.3 (L) 33.6 - 35.0 g/dL    RDW 44.4 35.9 - 50.0 fL    Platelet Count 243 164 - 446 K/uL    MPV 10.9 9.0 - 12.9 fL    Neutrophils-Polys 83.30 (H) 44.00 - 72.00 %    Lymphocytes 5.80 (L) 22.00 - 41.00 %    Monocytes 9.30 0.00 - 13.40 %    Eosinophils 0.80 0.00 - 6.90 %    Basophils 0.40 0.00 - 1.80 %    Immature Granulocytes 0.40 0.00 - 0.90 %    Nucleated RBC 0.00 /100 WBC    Neutrophils " (Absolute) 11.31 (H) 2.00 - 7.15 K/uL    Lymphs (Absolute) 0.79 (L) 1.00 - 4.80 K/uL    Monos (Absolute) 1.26 (H) 0.00 - 0.85 K/uL    Eos (Absolute) 0.11 0.00 - 0.51 K/uL    Baso (Absolute) 0.05 0.00 - 0.12 K/uL    Immature Granulocytes (abs) 0.06 0.00 - 0.11 K/uL    NRBC (Absolute) 0.00 K/uL   COMP METABOLIC PANEL   Result Value Ref Range    Sodium 139 135 - 145 mmol/L    Potassium 4.5 3.6 - 5.5 mmol/L    Chloride 108 96 - 112 mmol/L    Co2 22 20 - 33 mmol/L    Anion Gap 9.0 7.0 - 16.0    Glucose 112 (H) 65 - 99 mg/dL    Bun 5 (L) 8 - 22 mg/dL    Creatinine 0.60 0.50 - 1.40 mg/dL    Calcium 8.5 8.5 - 10.5 mg/dL    AST(SGOT) 41 12 - 45 U/L    ALT(SGPT) 60 (H) 2 - 50 U/L    Alkaline Phosphatase 68 30 - 99 U/L    Total Bilirubin 0.4 0.1 - 1.5 mg/dL    Albumin 4.0 3.2 - 4.9 g/dL    Total Protein 7.0 6.0 - 8.2 g/dL    Globulin 3.0 1.9 - 3.5 g/dL    A-G Ratio 1.3 g/dL   LIPASE   Result Value Ref Range    Lipase 39 11 - 82 U/L   URINALYSIS (UA)    Specimen: Urine   Result Value Ref Range    Color Yellow     Character Clear     Specific Gravity 1.010 <1.035    Ph 6.0 5.0 - 8.0    Glucose Negative Negative mg/dL    Ketones Negative Negative mg/dL    Protein Negative Negative mg/dL    Bilirubin Negative Negative    Urobilinogen, Urine 0.2 Negative    Nitrite Negative Negative    Leukocyte Esterase Negative Negative    Occult Blood Negative Negative    Micro Urine Req see below    ESTIMATED GFR   Result Value Ref Range    GFR If African American >60 >60 mL/min/1.73 m 2    GFR If Non African American >60 >60 mL/min/1.73 m 2       Medications   HYDROmorphone (Dilaudid) injection 1 mg (1 mg Intravenous Given 7/14/21 0936)   midazolam (VERSED) injection 2 mg (has no administration in time range)   ondansetron (ZOFRAN) syringe/vial injection 4 mg (4 mg Intravenous Given 7/14/21 1644)   lactated ringers infusion (BOLUS) (1,000 mL Intravenous New Bag 7/14/21 0771)   iohexol (OMNIPAQUE) 240 mg/mL (25 mL Oral Given 7/14/21 1000)    iohexol (OMNIPAQUE) 350 mg/mL (100 mL Intravenous Given 7/14/21 0933)       Hydration: Patient was given IV fluids because of possible dehydration.  Oral fluids were not appropriate because of a possible surgical problem.  On recheck the patient was improved    COURSE & MEDICAL DECISION MAKING  Patient presents to the ER with abdominal pain and history as above.  She is identified to have small bowel obstruction.  Ordered NG tube.  She does not have a surgical abdominal exam.  No leukocytosis.  Trial of medical management appears appropriate.  I paged hospitalist for admission.  Discussed case with Dr. Braun    FINAL IMPRESSION  1.  Small bowel obstruction        This dictation was created using voice recognition software. The accuracy of the dictation is limited to the abilities of the software.  The nursing notes were reviewed and certain aspects of this information were incorporated into this note.    Electronically signed by: Luis Garcia M.D., 7/14/2021 7:05 AM

## 2021-07-14 NOTE — ASSESSMENT & PLAN NOTE
Patient has very complex previous surgical history, and has well-known history of adhesions and previous small bowel obstructions.  I have discussed case today with her primary gastroenterologist Dr. Bradford as well as her previous general surgeon Dr. Verma.  Both are in agreement with supportive measures, NG tube decompression and conservative management.  Patient has been through this many times before and is in agreement with the treatment plan as noted above.  Admit to surgical floor  She did well with clamping today.  Trial clear liquids.

## 2021-07-15 ENCOUNTER — APPOINTMENT (OUTPATIENT)
Dept: RADIOLOGY | Facility: MEDICAL CENTER | Age: 46
DRG: 389 | End: 2021-07-15
Attending: INTERNAL MEDICINE
Payer: MEDICAID

## 2021-07-15 LAB
ANION GAP SERPL CALC-SCNC: 10 MMOL/L (ref 7–16)
BUN SERPL-MCNC: 6 MG/DL (ref 8–22)
CALCIUM SERPL-MCNC: 8.2 MG/DL (ref 8.5–10.5)
CHLORIDE SERPL-SCNC: 104 MMOL/L (ref 96–112)
CO2 SERPL-SCNC: 24 MMOL/L (ref 20–33)
CREAT SERPL-MCNC: 0.63 MG/DL (ref 0.5–1.4)
ERYTHROCYTE [DISTWIDTH] IN BLOOD BY AUTOMATED COUNT: 43.6 FL (ref 35.9–50)
GLUCOSE SERPL-MCNC: 84 MG/DL (ref 65–99)
HCT VFR BLD AUTO: 37.1 % (ref 37–47)
HGB BLD-MCNC: 12.1 G/DL (ref 12–16)
MCH RBC QN AUTO: 30 PG (ref 27–33)
MCHC RBC AUTO-ENTMCNC: 32.6 G/DL (ref 33.6–35)
MCV RBC AUTO: 91.8 FL (ref 81.4–97.8)
PLATELET # BLD AUTO: 175 K/UL (ref 164–446)
PMV BLD AUTO: 10.5 FL (ref 9–12.9)
POTASSIUM SERPL-SCNC: 3.6 MMOL/L (ref 3.6–5.5)
RBC # BLD AUTO: 4.04 M/UL (ref 4.2–5.4)
SODIUM SERPL-SCNC: 138 MMOL/L (ref 135–145)
WBC # BLD AUTO: 6.2 K/UL (ref 4.8–10.8)

## 2021-07-15 PROCEDURE — A9270 NON-COVERED ITEM OR SERVICE: HCPCS | Performed by: HOSPITALIST

## 2021-07-15 PROCEDURE — 80048 BASIC METABOLIC PNL TOTAL CA: CPT

## 2021-07-15 PROCEDURE — 770006 HCHG ROOM/CARE - MED/SURG/GYN SEMI*

## 2021-07-15 PROCEDURE — 99233 SBSQ HOSP IP/OBS HIGH 50: CPT | Performed by: INTERNAL MEDICINE

## 2021-07-15 PROCEDURE — 700111 HCHG RX REV CODE 636 W/ 250 OVERRIDE (IP): Performed by: INTERNAL MEDICINE

## 2021-07-15 PROCEDURE — C9113 INJ PANTOPRAZOLE SODIUM, VIA: HCPCS | Performed by: INTERNAL MEDICINE

## 2021-07-15 PROCEDURE — 700102 HCHG RX REV CODE 250 W/ 637 OVERRIDE(OP): Performed by: HOSPITALIST

## 2021-07-15 PROCEDURE — 700105 HCHG RX REV CODE 258: Performed by: INTERNAL MEDICINE

## 2021-07-15 PROCEDURE — 700111 HCHG RX REV CODE 636 W/ 250 OVERRIDE (IP): Performed by: HOSPITALIST

## 2021-07-15 PROCEDURE — 700105 HCHG RX REV CODE 258: Performed by: HOSPITALIST

## 2021-07-15 PROCEDURE — 85027 COMPLETE CBC AUTOMATED: CPT

## 2021-07-15 RX ORDER — METOCLOPRAMIDE HYDROCHLORIDE 5 MG/ML
10 INJECTION INTRAMUSCULAR; INTRAVENOUS EVERY 4 HOURS PRN
Status: DISCONTINUED | OUTPATIENT
Start: 2021-07-15 | End: 2021-07-16

## 2021-07-15 RX ORDER — LORAZEPAM 2 MG/ML
0.5 INJECTION INTRAMUSCULAR NIGHTLY PRN
Status: DISCONTINUED | OUTPATIENT
Start: 2021-07-15 | End: 2021-07-15

## 2021-07-15 RX ORDER — ACETAMINOPHEN 650 MG/1
975 SUPPOSITORY RECTAL EVERY 6 HOURS PRN
Status: DISCONTINUED | OUTPATIENT
Start: 2021-07-15 | End: 2021-07-17 | Stop reason: HOSPADM

## 2021-07-15 RX ORDER — LORAZEPAM 2 MG/ML
0.5 INJECTION INTRAMUSCULAR 2 TIMES DAILY PRN
Status: DISCONTINUED | OUTPATIENT
Start: 2021-07-15 | End: 2021-07-17 | Stop reason: HOSPADM

## 2021-07-15 RX ORDER — ACETAMINOPHEN 650 MG/1
975 SUPPOSITORY RECTAL EVERY 6 HOURS PRN
Status: DISCONTINUED | OUTPATIENT
Start: 2021-07-22 | End: 2021-07-17 | Stop reason: HOSPADM

## 2021-07-15 RX ADMIN — LORAZEPAM 0.5 MG: 2 INJECTION INTRAMUSCULAR; INTRAVENOUS at 21:37

## 2021-07-15 RX ADMIN — LORAZEPAM 0.5 MG: 2 INJECTION INTRAMUSCULAR; INTRAVENOUS at 11:20

## 2021-07-15 RX ADMIN — ONDANSETRON 4 MG: 2 INJECTION INTRAMUSCULAR; INTRAVENOUS at 20:25

## 2021-07-15 RX ADMIN — Medication: at 13:59

## 2021-07-15 RX ADMIN — ACETAMINOPHEN 975 MG: 650 SUPPOSITORY RECTAL at 05:37

## 2021-07-15 RX ADMIN — ONDANSETRON 4 MG: 2 INJECTION INTRAMUSCULAR; INTRAVENOUS at 07:07

## 2021-07-15 RX ADMIN — SODIUM CHLORIDE, POTASSIUM CHLORIDE, SODIUM LACTATE AND CALCIUM CHLORIDE: 600; 310; 30; 20 INJECTION, SOLUTION INTRAVENOUS at 10:08

## 2021-07-15 RX ADMIN — SODIUM CHLORIDE 40 MG: 9 INJECTION, SOLUTION INTRAVENOUS at 10:07

## 2021-07-15 RX ADMIN — Medication: at 03:47

## 2021-07-15 RX ADMIN — METOCLOPRAMIDE 10 MG: 5 INJECTION, SOLUTION INTRAMUSCULAR; INTRAVENOUS at 10:02

## 2021-07-15 RX ADMIN — ONDANSETRON 4 MG: 2 INJECTION INTRAMUSCULAR; INTRAVENOUS at 16:23

## 2021-07-15 RX ADMIN — ACETAMINOPHEN 975 MG: 650 SUPPOSITORY RECTAL at 00:00

## 2021-07-15 RX ADMIN — SODIUM CHLORIDE, POTASSIUM CHLORIDE, SODIUM LACTATE AND CALCIUM CHLORIDE: 600; 310; 30; 20 INJECTION, SOLUTION INTRAVENOUS at 20:23

## 2021-07-15 RX ADMIN — ENOXAPARIN SODIUM 40 MG: 40 INJECTION SUBCUTANEOUS at 05:36

## 2021-07-15 ASSESSMENT — ENCOUNTER SYMPTOMS
NAUSEA: 1
SHORTNESS OF BREATH: 0
MYALGIAS: 1
NERVOUS/ANXIOUS: 1
HEADACHES: 1
CONSTIPATION: 1
ABDOMINAL PAIN: 1
INSOMNIA: 1

## 2021-07-15 ASSESSMENT — PAIN DESCRIPTION - PAIN TYPE
TYPE: ACUTE PAIN

## 2021-07-15 NOTE — PROGRESS NOTES
Pt A&Ox4 , denies numbness and tingling, pain 7/10 (pt on PCA and the pt just did 4 lapse of walk in the hallway).    Pt refused bed alarm despite education, pt has call light within reach.

## 2021-07-15 NOTE — PROCEDURES
Vascular Access Team    Date of Insertion: 7/14/2021  Arm Circumference: n/a  Line Length: 10cm  Line Size: 18g  Vein Occupancy %: 31  Reason for Midline: access  Labs: WBC 13.6, , BUN 5, Cr 0.60, GFR >60, INR n/a    Orders confirmed, vessel patency confirmed with ultrasound. Risks and benefits of procedure explained to patient and education regarding line associated bloodstream infections provided. Questions answered.     PowerGlide Midline placed in RUE per licensed provider order with ultrasound guidance. 18g, 10 cm line placed in basilic vein after 1 attempt(s).  Catheter inserted with brisk blood return. Secured with 0cm external from insertion site.  Line flushed without resistance with 10 mL 0.9% normal saline.  Midline secured with Biopatch and Tegaderm.     Midline placement is confirmed by nurse using ultrasound and ability to flush and draw blood. Midline is appropriate for use at this time.  No X-ray is needed for placement confirmation. Pt tolerated procedure well.  Patient condition relayed to unit RN or ordering physician via this post procedure note in the EMR.    Ultrasound images uploaded to PACS and viewable in the EMR - yes  Ultrasound imaged printed and placed in paper chart - no      BARD PowerGlide Midline ref # [id number], Lot # [id number], Expiration Date 1/31/2022

## 2021-07-15 NOTE — PROGRESS NOTES
Hospital Medicine Daily Progress Note    Date of Service  7/15/2021    Chief Complaint  Corazon Berg is a 45 y.o. female admitted 7/14/2021 with abd pain    Hospital Course  46 yo woman with narcotic dependence, neurogenic bladder, complex surgical history for liver abscess after gynecologic procedure and multiple colon resections and reversal of ostomy in 2013, who is followed by Dr. Gutierrez and Dr. Lopez, and has recurrent bowel obstruction. She presented with abd pain, N/V. CT showed SBO. Case was discussed with surgery and she was admitted for NGT decompression.     Interval Problem Update  She has a headache from compazine and wants to try something else for nausea  She is feeling anxious, takes medication for this at home  She is worried that her gastric output appears dark  She has passed a small amount of gas. Still has pain to right side of her abd. She is tolerating PCA pump.  She is ambulating in the hallways    I have personally seen and examined the patient at bedside. I discussed the plan of care with patient.    Consultants/Specialty  none    Code Status  Full Code    Disposition  Patient is not medically cleared.   Anticipate discharge to to home with close outpatient follow-up.  I have placed the appropriate orders for post-discharge needs.    Review of Systems  Review of Systems   Constitutional: Positive for malaise/fatigue.   Respiratory: Negative for shortness of breath.    Cardiovascular: Positive for leg swelling. Negative for chest pain.   Gastrointestinal: Positive for abdominal pain, constipation and nausea.   Musculoskeletal: Positive for myalgias.   Neurological: Positive for headaches.   Psychiatric/Behavioral: The patient is nervous/anxious and has insomnia.    All other systems reviewed and are negative.       Physical Exam  Temp:  [36.4 °C (97.6 °F)-36.7 °C (98 °F)] 36.4 °C (97.6 °F)  Pulse:  [57-73] 67  Resp:  [15-17] 17  BP: (107-146)/(63-90) 146/90  SpO2:  [91 %-96  %] 92 %    Physical Exam  Vitals and nursing note reviewed.   Constitutional:       General: She is not in acute distress.     Appearance: She is not toxic-appearing.   HENT:      Head: Normocephalic.      Mouth/Throat:      Mouth: Mucous membranes are moist.   Eyes:      General:         Right eye: No discharge.         Left eye: No discharge.   Cardiovascular:      Rate and Rhythm: Normal rate and regular rhythm.   Pulmonary:      Effort: Pulmonary effort is normal. No respiratory distress.      Breath sounds: No wheezing or rales.   Abdominal:      General: There is no distension.      Palpations: Abdomen is soft.      Tenderness: There is abdominal tenderness (right side). There is no guarding or rebound.      Comments: Hypoactive bowel sounds   Musculoskeletal:      Cervical back: Neck supple.      Right lower leg: Edema (trace) present.      Left lower leg: Edema (trace) present.   Skin:     General: Skin is warm and dry.   Neurological:      Mental Status: She is alert.      Comments: AOx4   Psychiatric:         Mood and Affect: Mood is anxious.         Fluids  No intake or output data in the 24 hours ending 07/15/21 1234    Laboratory  Recent Labs     07/14/21  0610 07/15/21  0510   WBC 13.6* 6.2   RBC 4.93 4.04*   HEMOGLOBIN 14.7 12.1   HEMATOCRIT 45.5 37.1   MCV 92.3 91.8   MCH 29.8 30.0   MCHC 32.3* 32.6*   RDW 44.4 43.6   PLATELETCT 243 175   MPV 10.9 10.5     Recent Labs     07/14/21  0610 07/15/21  0657   SODIUM 139 138   POTASSIUM 4.5 3.6   CHLORIDE 108 104   CO2 22 24   GLUCOSE 112* 84   BUN 5* 6*   CREATININE 0.60 0.63   CALCIUM 8.5 8.2*                   Imaging  IR-MIDLINE CATHETER INSERTION WO GUIDANCE > AGE 3   Final Result                  Ultrasound-guided midline placement performed by qualified nursing staff    as above.          DX-ABDOMEN FOR TUBE PLACEMENT   Final Result      Enteric tube projects over the stomach.      CT-ABDOMEN-PELVIS WITH   Final Result      1.  Dilated loops of small  bowel compatible with small bowel obstruction.   2.  Stable hypodense lesion within the right lobe of the liver could represent a cyst or hemangioma.   3.  Postsurgical changes of the colon. Moderate amount of colonic stool.   4.  Minimal right hydronephrosis.   5.  Trace free fluid.              Assessment/Plan  Chronic narcotic dependence (HCC)- (present on admission)  Assessment & Plan  Patient takes p.o. Dilaudid at home  We will have her on a PCA while she is in-house and transition to p.o. medications once she has return of bowel function    PTSD (post-traumatic stress disorder)- (present on admission)  Assessment & Plan  Supportive measures  Ativan PRN    Neurogenic bladder-straight cath - (present on admission)  Assessment & Plan  Patient straight caths 3 times daily, we will continue that while she is in house      SBO (small bowel obstruction) (HCC)- (present on admission)  Assessment & Plan  Patient has very complex previous surgical history, and has well-known history of adhesions and previous small bowel obstructions.  I have discussed case today with her primary gastroenterologist Dr. Bradford as well as her previous general surgeon Dr. Verma.  Both are in agreement with supportive measures, NG tube decompression and conservative management.  Patient has been through this many times before and is in agreement with the treatment plan as noted above.  Admit to surgical floor  NGT placement to low wall suction, monitor output  IV fluids  Pain management with PCA  Antiemetics - zofran, reglan  Follow serial labs and physical exam  Low threshold to repeat imaging  PPI for GERD symptoms       VTE prophylaxis: enoxaparin ppx    I have performed a physical exam and reviewed and updated ROS and Plan today (7/15/2021). In review of yesterday's note (7/14/2021), there are no changes except as documented above.

## 2021-07-15 NOTE — CARE PLAN
The patient is Watcher - Medium risk of patient condition declining or worsening    Shift Goals  Clinical Goals: pain management ; resolution of SBO  Patient Goals: pain management  Family Goals: no family at bedside    Progress made toward(s) clinical / shift goals:  progressing    Patient is not progressing towards the following goals:

## 2021-07-16 LAB
ANION GAP SERPL CALC-SCNC: 12 MMOL/L (ref 7–16)
BUN SERPL-MCNC: 7 MG/DL (ref 8–22)
CALCIUM SERPL-MCNC: 8.3 MG/DL (ref 8.5–10.5)
CHLORIDE SERPL-SCNC: 102 MMOL/L (ref 96–112)
CO2 SERPL-SCNC: 25 MMOL/L (ref 20–33)
CREAT SERPL-MCNC: 0.6 MG/DL (ref 0.5–1.4)
GLUCOSE SERPL-MCNC: 70 MG/DL (ref 65–99)
POTASSIUM SERPL-SCNC: 3.7 MMOL/L (ref 3.6–5.5)
SODIUM SERPL-SCNC: 139 MMOL/L (ref 135–145)

## 2021-07-16 PROCEDURE — 700111 HCHG RX REV CODE 636 W/ 250 OVERRIDE (IP): Performed by: INTERNAL MEDICINE

## 2021-07-16 PROCEDURE — 700102 HCHG RX REV CODE 250 W/ 637 OVERRIDE(OP): Performed by: HOSPITALIST

## 2021-07-16 PROCEDURE — 770006 HCHG ROOM/CARE - MED/SURG/GYN SEMI*

## 2021-07-16 PROCEDURE — C9113 INJ PANTOPRAZOLE SODIUM, VIA: HCPCS | Performed by: INTERNAL MEDICINE

## 2021-07-16 PROCEDURE — A9270 NON-COVERED ITEM OR SERVICE: HCPCS | Performed by: INTERNAL MEDICINE

## 2021-07-16 PROCEDURE — 99232 SBSQ HOSP IP/OBS MODERATE 35: CPT | Performed by: INTERNAL MEDICINE

## 2021-07-16 PROCEDURE — 80048 BASIC METABOLIC PNL TOTAL CA: CPT

## 2021-07-16 PROCEDURE — 700105 HCHG RX REV CODE 258: Performed by: HOSPITALIST

## 2021-07-16 PROCEDURE — 700111 HCHG RX REV CODE 636 W/ 250 OVERRIDE (IP): Performed by: HOSPITALIST

## 2021-07-16 PROCEDURE — A9270 NON-COVERED ITEM OR SERVICE: HCPCS | Performed by: HOSPITALIST

## 2021-07-16 PROCEDURE — 700105 HCHG RX REV CODE 258: Performed by: INTERNAL MEDICINE

## 2021-07-16 PROCEDURE — 700102 HCHG RX REV CODE 250 W/ 637 OVERRIDE(OP): Performed by: INTERNAL MEDICINE

## 2021-07-16 RX ORDER — POLYETHYLENE GLYCOL 3350 17 G/17G
1 POWDER, FOR SOLUTION ORAL DAILY
Status: DISCONTINUED | OUTPATIENT
Start: 2021-07-16 | End: 2021-07-17 | Stop reason: HOSPADM

## 2021-07-16 RX ORDER — AMOXICILLIN 250 MG
2 CAPSULE ORAL 2 TIMES DAILY
Status: DISCONTINUED | OUTPATIENT
Start: 2021-07-16 | End: 2021-07-17 | Stop reason: HOSPADM

## 2021-07-16 RX ORDER — PROMETHAZINE HYDROCHLORIDE 25 MG/1
25 SUPPOSITORY RECTAL EVERY 6 HOURS PRN
Status: DISCONTINUED | OUTPATIENT
Start: 2021-07-16 | End: 2021-07-17 | Stop reason: HOSPADM

## 2021-07-16 RX ORDER — BISACODYL 10 MG
10 SUPPOSITORY, RECTAL RECTAL
Status: DISCONTINUED | OUTPATIENT
Start: 2021-07-16 | End: 2021-07-17 | Stop reason: HOSPADM

## 2021-07-16 RX ADMIN — SODIUM CHLORIDE, POTASSIUM CHLORIDE, SODIUM LACTATE AND CALCIUM CHLORIDE: 600; 310; 30; 20 INJECTION, SOLUTION INTRAVENOUS at 05:27

## 2021-07-16 RX ADMIN — ENOXAPARIN SODIUM 40 MG: 40 INJECTION SUBCUTANEOUS at 05:22

## 2021-07-16 RX ADMIN — PROMETHAZINE HYDROCHLORIDE 25 MG: 25 SUPPOSITORY RECTAL at 17:11

## 2021-07-16 RX ADMIN — SODIUM CHLORIDE 40 MG: 9 INJECTION, SOLUTION INTRAVENOUS at 05:29

## 2021-07-16 RX ADMIN — SERTRALINE HYDROCHLORIDE 100 MG: 100 TABLET ORAL at 20:47

## 2021-07-16 RX ADMIN — DOCUSATE SODIUM 50 MG AND SENNOSIDES 8.6 MG 2 TABLET: 8.6; 5 TABLET, FILM COATED ORAL at 17:11

## 2021-07-16 RX ADMIN — Medication: at 23:43

## 2021-07-16 RX ADMIN — ONDANSETRON 4 MG: 2 INJECTION INTRAMUSCULAR; INTRAVENOUS at 01:08

## 2021-07-16 RX ADMIN — POLYETHYLENE GLYCOL 3350 1 PACKET: 17 POWDER, FOR SOLUTION ORAL at 08:42

## 2021-07-16 RX ADMIN — Medication: at 11:28

## 2021-07-16 RX ADMIN — LORAZEPAM 0.5 MG: 2 INJECTION INTRAMUSCULAR; INTRAVENOUS at 20:47

## 2021-07-16 RX ADMIN — SODIUM CHLORIDE, POTASSIUM CHLORIDE, SODIUM LACTATE AND CALCIUM CHLORIDE: 600; 310; 30; 20 INJECTION, SOLUTION INTRAVENOUS at 23:46

## 2021-07-16 RX ADMIN — PROMETHAZINE HYDROCHLORIDE 25 MG: 25 SUPPOSITORY RECTAL at 09:41

## 2021-07-16 RX ADMIN — Medication: at 01:29

## 2021-07-16 ASSESSMENT — ENCOUNTER SYMPTOMS
NAUSEA: 0
MYALGIAS: 1
ABDOMINAL PAIN: 0
SHORTNESS OF BREATH: 0
WEAKNESS: 0
HEADACHES: 0
NERVOUS/ANXIOUS: 0
CONSTIPATION: 1

## 2021-07-16 ASSESSMENT — PAIN DESCRIPTION - PAIN TYPE
TYPE: ACUTE PAIN

## 2021-07-16 NOTE — CARE PLAN
Problem: Pain - Standard  Goal: Alleviation of pain or a reduction in pain to the patient’s comfort goal  Outcome: Progressing     Problem: Knowledge Deficit - Standard  Goal: Patient and family/care givers will demonstrate understanding of plan of care, disease process/condition, diagnostic tests and medications  Outcome: Progressing   The patient is Stable - Low risk of patient condition declining or worsening    Shift Goals  Clinical Goals: Pain management  Patient Goals: Patient will rest comfortably.  Family Goals: Support system    Progress made toward(s) clinical / shift goals:  PCA for pain management, POC discussed, pending NGT clamp trials, clear liquid diet.    Patient is not progressing towards the following goals:

## 2021-07-16 NOTE — PROGRESS NOTES
NGT has been clamped since 0900. Pt tolerating well, had minimal nausea at first though improved after phenergan suppository.     1545 Pt upgraded to clear liquid diet, tolerating well.

## 2021-07-16 NOTE — PROGRESS NOTES
Hospital Medicine Daily Progress Note    Date of Service  7/16/2021    Chief Complaint  Corazon Berg is a 45 y.o. female admitted 7/14/2021 with abd pain    Hospital Course  46 yo woman with narcotic dependence, neurogenic bladder, complex surgical history for liver abscess after gynecologic procedure and multiple colon resections and reversal of ostomy in 2013, who is followed by Dr. Gutierrez and Dr. Lopez, and has recurrent bowel obstruction. She presented with abd pain, N/V. CT showed SBO. Case was discussed with surgery and she was admitted for NGT decompression.     Interval Problem Update  Her nausea and abd pain are improved. She's passing frequent gas. We clamped tube today without further return of symptoms.    I have personally seen and examined the patient at bedside. I discussed the plan of care with patient.    Consultants/Specialty  none    Code Status  Full Code    Disposition  Patient is not medically cleared.   Anticipate discharge to to home with close outpatient follow-up.  I have placed the appropriate orders for post-discharge needs.    Review of Systems  Review of Systems   Constitutional: Negative for malaise/fatigue.   Respiratory: Negative for shortness of breath.    Cardiovascular: Negative for chest pain and leg swelling.   Gastrointestinal: Positive for constipation. Negative for abdominal pain and nausea.   Musculoskeletal: Positive for myalgias.   Neurological: Negative for weakness and headaches.   Psychiatric/Behavioral: The patient is not nervous/anxious.    All other systems reviewed and are negative.       Physical Exam  Temp:  [36.1 °C (96.9 °F)-36.8 °C (98.2 °F)] 36.4 °C (97.6 °F)  Pulse:  [60-72] 72  Resp:  [16-18] 16  BP: (127-152)/(69-90) 127/69  SpO2:  [90 %-100 %] 93 %    Physical Exam  Vitals and nursing note reviewed.   Constitutional:       General: She is not in acute distress.     Appearance: She is not toxic-appearing.   HENT:      Head: Normocephalic.       Mouth/Throat:      Mouth: Mucous membranes are moist.   Eyes:      General:         Right eye: No discharge.         Left eye: No discharge.   Cardiovascular:      Rate and Rhythm: Normal rate and regular rhythm.   Pulmonary:      Effort: Pulmonary effort is normal. No respiratory distress.      Breath sounds: No wheezing or rales.   Abdominal:      General: There is no distension.      Palpations: Abdomen is soft.      Tenderness: There is abdominal tenderness (right side, decreased). There is no guarding or rebound.   Musculoskeletal:      Cervical back: Neck supple.      Right lower leg: Edema (trace) present.      Left lower leg: Edema (trace) present.   Skin:     General: Skin is warm and dry.   Neurological:      Mental Status: She is alert.      Comments: AOx4   Psychiatric:         Mood and Affect: Mood is not anxious.         Fluids    Intake/Output Summary (Last 24 hours) at 7/16/2021 1501  Last data filed at 7/15/2021 2000  Gross per 24 hour   Intake 1086.67 ml   Output --   Net 1086.67 ml       Laboratory  Recent Labs     07/14/21  0610 07/15/21  0510   WBC 13.6* 6.2   RBC 4.93 4.04*   HEMOGLOBIN 14.7 12.1   HEMATOCRIT 45.5 37.1   MCV 92.3 91.8   MCH 29.8 30.0   MCHC 32.3* 32.6*   RDW 44.4 43.6   PLATELETCT 243 175   MPV 10.9 10.5     Recent Labs     07/14/21  0610 07/15/21  0657 07/16/21  0953   SODIUM 139 138 139   POTASSIUM 4.5 3.6 3.7   CHLORIDE 108 104 102   CO2 22 24 25   GLUCOSE 112* 84 70   BUN 5* 6* 7*   CREATININE 0.60 0.63 0.60   CALCIUM 8.5 8.2* 8.3*                   Imaging  DX-ABDOMEN FOR TUBE PLACEMENT   Final Result      Enteric tube projects over the stomach.      IR-MIDLINE CATHETER INSERTION WO GUIDANCE > AGE 3   Final Result                  Ultrasound-guided midline placement performed by qualified nursing staff    as above.          DX-ABDOMEN FOR TUBE PLACEMENT   Final Result      Enteric tube projects over the stomach.      CT-ABDOMEN-PELVIS WITH   Final Result      1.   Dilated loops of small bowel compatible with small bowel obstruction.   2.  Stable hypodense lesion within the right lobe of the liver could represent a cyst or hemangioma.   3.  Postsurgical changes of the colon. Moderate amount of colonic stool.   4.  Minimal right hydronephrosis.   5.  Trace free fluid.              Assessment/Plan  Chronic narcotic dependence (HCC)- (present on admission)  Assessment & Plan  Patient takes p.o. Dilaudid at home  We will have her on a PCA while she is in-house and transition to p.o. medications once she has return of bowel function    PTSD (post-traumatic stress disorder)- (present on admission)  Assessment & Plan  Supportive measures  Ativan PRN    Neurogenic bladder-straight cath - (present on admission)  Assessment & Plan  Patient straight caths 3 times daily, we will continue that while she is in house      SBO (small bowel obstruction) (HCC)- (present on admission)  Assessment & Plan  Patient has very complex previous surgical history, and has well-known history of adhesions and previous small bowel obstructions.  I have discussed case today with her primary gastroenterologist Dr. Bradford as well as her previous general surgeon Dr. Verma.  Both are in agreement with supportive measures, NG tube decompression and conservative management.  Patient has been through this many times before and is in agreement with the treatment plan as noted above.  Admit to surgical floor  She did well with clamping today.  Trial clear liquids.        VTE prophylaxis: enoxaparin ppx    I have performed a physical exam and reviewed and updated ROS and Plan today (7/16/2021). In review of yesterday's note (7/15/2021), there are no changes except as documented above.

## 2021-07-16 NOTE — CARE PLAN
The patient is Stable - Low risk of patient condition declining or worsening    Shift Goals  Clinical Goals: Pain management  Patient Goals: Patient will rest comfortably.  Family Goals: Support system    Progress made toward(s) clinical / shift goals:  Patient is resting comfortably.  No complaints of increasing pain.      Patient is not progressing towards the following goals:      Problem: Pain - Standard  Goal: Alleviation of pain or a reduction in pain to the patient’s comfort goal  Outcome: Progressing     Problem: Knowledge Deficit - Standard  Goal: Patient and family/care givers will demonstrate understanding of plan of care, disease process/condition, diagnostic tests and medications  Outcome: Progressing

## 2021-07-17 ENCOUNTER — PHARMACY VISIT (OUTPATIENT)
Dept: PHARMACY | Facility: MEDICAL CENTER | Age: 46
End: 2021-07-17
Payer: COMMERCIAL

## 2021-07-17 VITALS
HEART RATE: 72 BPM | HEIGHT: 65 IN | DIASTOLIC BLOOD PRESSURE: 94 MMHG | RESPIRATION RATE: 18 BRPM | OXYGEN SATURATION: 95 % | SYSTOLIC BLOOD PRESSURE: 153 MMHG | WEIGHT: 203.93 LBS | TEMPERATURE: 98.8 F | BODY MASS INDEX: 33.98 KG/M2

## 2021-07-17 LAB
ANION GAP SERPL CALC-SCNC: 8 MMOL/L (ref 7–16)
BUN SERPL-MCNC: 3 MG/DL (ref 8–22)
CALCIUM SERPL-MCNC: 8.3 MG/DL (ref 8.5–10.5)
CHLORIDE SERPL-SCNC: 100 MMOL/L (ref 96–112)
CO2 SERPL-SCNC: 28 MMOL/L (ref 20–33)
CREAT SERPL-MCNC: 0.61 MG/DL (ref 0.5–1.4)
GLUCOSE SERPL-MCNC: 124 MG/DL (ref 65–99)
POTASSIUM SERPL-SCNC: 4.6 MMOL/L (ref 3.6–5.5)
SODIUM SERPL-SCNC: 136 MMOL/L (ref 135–145)

## 2021-07-17 PROCEDURE — 700105 HCHG RX REV CODE 258: Performed by: INTERNAL MEDICINE

## 2021-07-17 PROCEDURE — 700111 HCHG RX REV CODE 636 W/ 250 OVERRIDE (IP): Performed by: HOSPITALIST

## 2021-07-17 PROCEDURE — C9113 INJ PANTOPRAZOLE SODIUM, VIA: HCPCS | Performed by: INTERNAL MEDICINE

## 2021-07-17 PROCEDURE — 700105 HCHG RX REV CODE 258: Performed by: HOSPITALIST

## 2021-07-17 PROCEDURE — RXMED WILLOW AMBULATORY MEDICATION CHARGE: Performed by: INTERNAL MEDICINE

## 2021-07-17 PROCEDURE — 700111 HCHG RX REV CODE 636 W/ 250 OVERRIDE (IP): Performed by: INTERNAL MEDICINE

## 2021-07-17 PROCEDURE — A9270 NON-COVERED ITEM OR SERVICE: HCPCS | Performed by: INTERNAL MEDICINE

## 2021-07-17 PROCEDURE — 700102 HCHG RX REV CODE 250 W/ 637 OVERRIDE(OP): Performed by: INTERNAL MEDICINE

## 2021-07-17 PROCEDURE — 80048 BASIC METABOLIC PNL TOTAL CA: CPT

## 2021-07-17 PROCEDURE — 99239 HOSP IP/OBS DSCHRG MGMT >30: CPT | Performed by: INTERNAL MEDICINE

## 2021-07-17 RX ORDER — HYDRALAZINE HYDROCHLORIDE 50 MG/1
50 TABLET, FILM COATED ORAL EVERY 8 HOURS PRN
Status: DISCONTINUED | OUTPATIENT
Start: 2021-07-17 | End: 2021-07-17 | Stop reason: HOSPADM

## 2021-07-17 RX ORDER — HYDROMORPHONE HYDROCHLORIDE 2 MG/1
2 TABLET ORAL EVERY 4 HOURS PRN
Status: DISCONTINUED | OUTPATIENT
Start: 2021-07-17 | End: 2021-07-17 | Stop reason: HOSPADM

## 2021-07-17 RX ADMIN — DOCUSATE SODIUM 50 MG AND SENNOSIDES 8.6 MG 2 TABLET: 8.6; 5 TABLET, FILM COATED ORAL at 05:54

## 2021-07-17 RX ADMIN — SODIUM CHLORIDE 40 MG: 9 INJECTION, SOLUTION INTRAVENOUS at 05:55

## 2021-07-17 RX ADMIN — HYDROMORPHONE HYDROCHLORIDE 2 MG: 2 TABLET ORAL at 16:53

## 2021-07-17 RX ADMIN — ENOXAPARIN SODIUM 40 MG: 40 INJECTION SUBCUTANEOUS at 05:59

## 2021-07-17 RX ADMIN — LORAZEPAM 0.5 MG: 2 INJECTION INTRAMUSCULAR; INTRAVENOUS at 03:51

## 2021-07-17 RX ADMIN — SODIUM CHLORIDE, POTASSIUM CHLORIDE, SODIUM LACTATE AND CALCIUM CHLORIDE: 600; 310; 30; 20 INJECTION, SOLUTION INTRAVENOUS at 10:07

## 2021-07-17 RX ADMIN — POLYETHYLENE GLYCOL 3350 1 PACKET: 17 POWDER, FOR SOLUTION ORAL at 05:55

## 2021-07-17 ASSESSMENT — PAIN DESCRIPTION - PAIN TYPE
TYPE: ACUTE PAIN

## 2021-07-17 NOTE — DISCHARGE INSTRUCTIONS
Discharge Instructions    Discharged to home by car with friend. Discharged via walking, hospital escort: Refused.  Special equipment needed: Not Applicable    Be sure to schedule a follow-up appointment with your primary care doctor or any specialists as instructed.     Discharge Plan:   Diet Plan: Discussed  Activity Level: Discussed  Confirmed Follow up Appointment: Patient to Call and Schedule Appointment  Confirmed Symptoms Management: Discussed  Medication Reconciliation Updated: Yes    I understand that a diet low in cholesterol, fat, and sodium is recommended for good health. Unless I have been given specific instructions below for another diet, I accept this instruction as my diet prescription.       Special Instructions:    Bowel Obstruction  A bowel obstruction is a blockage in the small or large bowel. The bowel, which is also called the intestine, is a long, slender tube that connects the stomach to the anus. When a person eats and drinks, food and fluids go from the mouth to the stomach to the small bowel. This is where most of the nutrients in the food and fluids are absorbed. After the small bowel, material passes through the large bowel for further absorption until any leftover material leaves the body as stool through the anus during a bowel movement.  A bowel obstruction will prevent food and fluids from passing through the bowel as they normally do during digestion. The bowel can become partially or completely blocked. If this condition is not treated, it can be dangerous because the bowel could rupture.  What are the causes?  Common causes of this condition include:  · Scar tissue (adhesions) from previous surgery or treatment with high-energy X-rays (radiation).  · Recent surgery. This may cause the movements of the bowel to slow down and cause food to block the intestine.  · Inflammatory bowel disease, such as Crohn's disease or diverticulitis.  · Growths or tumors.  · A bulging organ  (hernia).  · Twisting of the bowel (volvulus).  · A foreign body.  · Slipping of a part of the bowel into another part (intussusception).  What are the signs or symptoms?  Symptoms of this condition include:  · Pain in the abdomen. Depending on the degree of obstruction, pain may be:  ? Mild or severe.  ? Dull cramping or sharp pain.  ? In one area or in the entire abdomen.  · Nausea and vomiting. Vomit may be greenish or a yellow bile color.  · Bloating in the abdomen.  · Difficulty passing stool (constipation).  · Lack of passing gas.  · Frequent belching.  · Diarrhea. This may occur if the obstruction is partial and runny stool is able to leak around the obstruction.  How is this diagnosed?  This condition may be diagnosed based on:  · A physical exam.  · Medical history.  · Imaging tests of the abdomen or pelvis, such as X-ray or CT scan.  · Blood or urine tests.  How is this treated?  Treatment for this condition depends on the cause and severity of the problem. Treatment may include:  · Fluids and pain medicines that are given through an IV. Your health care provider may instruct you not to eat or drink if you have nausea or vomiting.  · Eating a simple diet. You may be asked to consume a clear liquid diet for several days. This allows the bowel to rest.  · Placement of a small tube (nasogastric tube) into the stomach. This will relieve pain, discomfort, and nausea by removing blocked air and fluids from the stomach. It can also help the obstruction clear up faster.  · Surgery. This may be required if other treatments do not work. Surgery may be required for:  ? Bowel obstruction from a hernia. This can be an emergency procedure.  ? Scar tissue that causes frequent or severe obstructions.  Follow these instructions at home:  Medicines  · Take over-the-counter and prescription medicines only as told by your health care provider.  · If you were prescribed an antibiotic medicine, take it as told by your health  care provider. Do not stop taking the antibiotic even if you start to feel better.  General instructions  · Follow instructions from your health care provider about eating restrictions. You may need to avoid solid foods and consume only clear liquids until your condition improves.  · Return to your normal activities as told by your health care provider. Ask your health care provider what activities are safe for you.  · Avoid sitting for a long time without moving. Get up to take short walks every 1-2 hours. This is important to improve blood flow and breathing. Ask for help if you feel weak or unsteady.  · Keep all follow-up visits as told by your health care provider. This is important.  How is this prevented?  After having a bowel obstruction, you are more likely to have another. You may do the following things to prevent another obstruction:  · If you have a long-term (chronic) disease, pay attention to your symptoms and contact your health care provider if you have questions or concerns.  · Avoid becoming constipated. To prevent or treat constipation, your health care provider may recommend that you:  ? Drink enough fluid to keep your urine pale yellow.  ? Take over-the-counter or prescription medicines.  ? Eat foods that are high in fiber, such as beans, whole grains, and fresh fruits and vegetables.  ? Limit foods that are high in fat and processed sugars, such as fried or sweet foods.  · Stay active. Exercise for 30 minutes or more, 5 or more days each week. Ask your health care provider which exercises are safe for you.  · Avoid stress. Find ways to reduce stress, such as meditation, exercise, or taking time for activities that relax you.  · Instead of eating three large meals each day, eat three small meals with three small snacks.  · Work with a dietitian to make a healthy meal plan that works for you.  · Do not use any products that contain nicotine or tobacco, such as cigarettes and e-cigarettes. If you  need help quitting, ask your health care provider.  Contact a health care provider if you:  · Have a fever.  · Have chills.  Get help right away if you:  · Have increased pain or cramping.  · Vomit blood.  · Have uncontrolled vomiting or nausea.  · Cannot drink fluids because of vomiting or pain.  · Become confused.  · Begin feeling very thirsty (dehydrated).  · Have severe bloating.  · Feel extremely weak or you faint.  Summary  · A bowel obstruction is a blockage in the small or large bowel.  · A bowel obstruction will prevent food and fluids from passing through the bowel as they normally do during digestion.  · Treatment for this condition depends on the cause and severity of the problem. It may include fluids and pain medicines through an IV, a simple diet, a nasogastric tube, or surgery.  · Follow instructions from your health care provider about eating restrictions. You may need to avoid solid foods and consume only clear liquids until your condition improves.  This information is not intended to replace advice given to you by your health care provider. Make sure you discuss any questions you have with your health care provider.  Document Released: 03/06/2007 Document Revised: 01/24/2020 Document Reviewed: 05/01/2019  [email or web address] Patient Education © 2020 [employer]      · Is patient discharged on Warfarin / Coumadin?   No     Depression / Suicide Risk    As you are discharged from this [facility] facility, it is important to learn how to keep safe from harming yourself.    Recognize the warning signs:  · Abrupt changes in personality, positive or negative- including increase in energy   · Giving away possessions  · Change in eating patterns- significant weight changes-  positive or negative  · Change in sleeping patterns- unable to sleep or sleeping all the time   · Unwillingness or inability to communicate  · Depression  · Unusual sadness, discouragement and loneliness  · Talk of wanting to die  · Neglect  of personal appearance   · Rebelliousness- reckless behavior  · Withdrawal from people/activities they love  · Confusion- inability to concentrate     If you or a loved one observes any of these behaviors or has concerns about self-harm, here's what you can do:  · Talk about it- your feelings and reasons for harming yourself  · Remove any means that you might use to hurt yourself (examples: pills, rope, extension cords, firearm)  · Get professional help from the community (Mental Health, Substance Abuse, psychological counseling)  · Do not be alone:Call your Safe Contact- someone whom you trust who will be there for you.  · Call your local CRISIS HOTLINE 371-9485 or 593-256-5882  · Call your local Children's Mobile Crisis Response Team [place] (872) 784-4813 or www.XDx  · Call the toll free National Suicide Prevention Hotlines   · National Suicide Prevention Lifeline 921-502-RHIC (1361)  · [employer] 800-SUICIDE (669-4331)    Magnesium Hydroxide oral suspension  What is this medicine?  MAGNESIUM HYDROXIDE (mag NEE zhum kaylie DROX bong) is a laxative. It is used to treat constipation.  This medicine may be used for other purposes; ask your health care provider or pharmacist if you have questions.  COMMON BRAND NAME(S): Dulcolax, Ex-Lax, Gatica Milk of Magnesia  What should I tell my health care provider before I take this medicine?  They need to know if you have any of these conditions:  · bowel, intestinal, or stomach disease  · change in bowel habits for more than 14 days  · kidney disease  · low magnesium diet  · nausea, vomiting  · stomach pain or blockage  · an unusual or allergic reaction to magnesium hydroxide, other medicines, foods, dyes, or preservatives  · pregnant or trying to get pregnant  · breast-feeding  How should I use this medicine?  Take this medicine by mouth. Follow the directions on the package or prescription label. Shake well before using. Use a specially  marked spoon or dropper to measure each dose. Ask your pharmacist if you do not have one. Household spoons are not accurate. After taking this medicine, drink a full glass of water. Take your medicine at regular intervals. Do not take your medicine more often than directed.  Talk to your pediatrician regarding the use of this medicine in children. While this medicine may be used in children for selected conditions, precautions do apply.  Overdosage: If you think you have taken too much of this medicine contact a poison control center or emergency room at once.  NOTE: This medicine is only for you. Do not share this medicine with others.  What if I miss a dose?  If you miss a dose, take it as soon as you can. If it is almost time for your next dose, take only that dose. Do not take double or extra doses.  What may interact with this medicine?  · antibiotics  · delavirdine  · gabapentin  · lactulose  · medicines for fungal infections like itraconazole and ketoconazole  · medicines for osteoporosis like alendronate, etidronate, risedronate and tiludronate  · medicines for seizures like ethotoin and phenytoin  · methenamine  · other magnesium-containing antacids, laxatives or supplements  · phenothiazines like chlorpromazine, mesoridazine, prochlorperazine, thioridazine  · quinidine  · rosuvastatin  · sodium polystyrene sulfonate  · sotalol  · vitamin D  This list may not describe all possible interactions. Give your health care provider a list of all the medicines, herbs, non-prescription drugs, or dietary supplements you use. Also tell them if you smoke, drink alcohol, or use illegal drugs. Some items may interact with your medicine.  What should I watch for while using this medicine?  Tell your doctor or healthcare professional if your symptoms do not start to get better or if they get worse. Do not treat yourself for constipation with this medicine for more than 1 week. See a doctor if you have black tarry stools,  rectal bleeding, or if you feel unusually tired. Do not change to another laxative product without advice.  If you are taking other medicines, leave an interval of at least 2 hours before or after taking this medicine.  To help reduce constipation, drink several glasses of water a day.  What side effects may I notice from receiving this medicine?  Side effects that you should report to your doctor or health care professional as soon as possible:  · allergic reactions like skin rash, itching or hives, swelling of the face, lips, or tongue  · confusion  · feeling faint or lightheaded, falls  · loss of appetite  · nausea, vomiting  · rectal bleeding  · unusually weak or tired  Side effects that usually do not require medical attention (report to your doctor or health care professional if they continue or are bothersome):  · chalky taste  · diarrhea  · stomach cramps  This list may not describe all possible side effects. Call your doctor for medical advice about side effects. You may report side effects to FDA at 2-136-OWZ-4153.  Where should I keep my medicine?  Keep out of the reach of children.  Store at room temperature between 15 and 30 degrees C (59 and 86 degrees F). Do not freeze. Protect from light and moisture. Throw away any unused medicine after the expiration date.  NOTE: This sheet is a summary. It may not cover all possible information. If you have questions about this medicine, talk to your doctor, pharmacist, or health care provider.  © 2020 [employer] (2009-07-06 14:54:13)

## 2021-07-17 NOTE — DISCHARGE SUMMARY
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 Gutierrez and Dr. Lopez, and has recurrent bowel obstruction. She presented with abd pain, N/V. CT showed SBO. Case was discussed with surgery and she was admitted for NGT decompression.   Her symptoms improved, she had BM. Her diet was advanced and she tolerated well. Milk of Mag was provided via [id number] prior to discharge.    Therefore, she is discharged in good and stable condition to home with close outpatient follow-up.    The patient met 2-midnight criteria for an inpatient stay at the time of discharge.    Discharge Date  7/17/21    FOLLOW UP ITEMS POST DISCHARGE  As needed for recurrent SBO    DISCHARGE DIAGNOSES  Active Problems:    SBO (small bowel obstruction) (HCC) POA: Yes    Neurogenic bladder-straight cath  POA: Yes    PTSD (post-traumatic stress disorder) POA: Yes      Overview: IMO load March 2020    Chronic narcotic dependence (HCC) POA: Yes  Resolved Problems:    * No resolved hospital problems. *      FOLLOW UP  No follow-up provider specified.    MEDICATIONS ON DISCHARGE     Medication List      START taking these medications      Instructions   Milk of Magnesia 400 MG/5ML Susp  Generic drug: magnesium hydroxide   Take 30 mL by mouth 1 time a day as needed (constipation).  Dose: 30 mL        CONTINUE taking these medications      Instructions   clonazePAM 0.5 MG Tabs  Commonly known as: KLONOPIN   Take 0.5 mg by mouth 3 times a day as needed (Anxiety).  Dose: 0.5 mg     cyanocobalamin 1000 MCG/ML Soln  Commonly known as: VITAMIN  "B-12   1,000 mcg by Intramuscular route every 7 days.  Dose: 1,000 mcg     HYDROmorphone 2 MG Tabs  Commonly known as: DILAUDID   Take 2 mg by mouth 3 times a day.  Dose: 2 mg     mirtazapine 15 MG Tbdp  Commonly known as: Remeron   Take 7.5 mg by mouth at bedtime as needed. 1/2 tablet = 7.5 mg.   Indications: PTSD  Dose: 7.5 mg     Narcan 4 MG/0.1ML Liqd  Generic drug: Naloxone   Spray 1 Spray in nose as needed.  Dose: 1 Spray     nitrofurantoin 50 MG Caps  Commonly known as: MACRODANTIN   Doctor's comments: Please consider 90 day supplies to promote better adherence  TAKE ONE CAPSULE BY MOUTH AS NEEDED FOR  UTI  RELATED  TO  SELF  CATHETER     oxyCODONE immediate release 10 MG immediate release tablet  Commonly known as: ROXICODONE   Take 10 mg by mouth every four hours as needed for Severe Pain.  Dose: 10 mg     sertraline 20 MG/ML concentrated solution  Commonly known as: ZOLOFT   Take 100 mg by mouth at bedtime. 5 mL = 100 mg.  Dose: 100 mg            Allergies  Allergies   Allergen Reactions   • Sammy-1 [Lidocaine Hcl] Anaphylaxis     Topical or injectable, \"subq numbing anesthetics\"  \"Causes Methemoglobinemia.\"   • Pcn [Penicillins] Rash             DIET  Orders Placed This Encounter   Procedures   • Diet Order Diet: Low Fiber(GI Soft)     Standing Status:   Standing     Number of Occurrences:   1     Order Specific Question:   Diet:     Answer:   Low Fiber(GI Soft) [2]       ACTIVITY  As tolerated.  Weight bearing as tolerated    CONSULTATIONS  None    PROCEDURES  DX-ABDOMEN FOR TUBE PLACEMENT   Final Result      Enteric tube projects over the stomach.      IR-MIDLINE CATHETER INSERTION WO GUIDANCE > AGE 3   Final Result                  Ultrasound-guided midline placement performed by qualified nursing staff    as above.          DX-ABDOMEN FOR TUBE PLACEMENT   Final Result      Enteric tube projects over the stomach.      CT-ABDOMEN-PELVIS WITH   Final Result      1.  Dilated loops of small bowel " compatible with small bowel obstruction.   2.  Stable hypodense lesion within the right lobe of the liver could represent a cyst or hemangioma.   3.  Postsurgical changes of the colon. Moderate amount of colonic stool.   4.  Minimal right hydronephrosis.   5.  Trace free fluid.               LABORATORY  Lab Results   Component Value Date    SODIUM 136 07/17/2021    POTASSIUM 4.6 07/17/2021    CHLORIDE 100 07/17/2021    CO2 28 07/17/2021    GLUCOSE 124 (H) 07/17/2021    BUN 3 (L) 07/17/2021    CREATININE 0.61 07/17/2021        Lab Results   Component Value Date    WBC 6.2 07/15/2021    HEMOGLOBIN 12.1 07/15/2021    HEMATOCRIT 37.1 07/15/2021    PLATELETCT 175 07/15/2021        Total time of the discharge process exceeds 32 minutes.

## 2021-07-17 NOTE — CARE PLAN
The patient is Stable - Low risk of patient condition declining or worsening    Shift Goals  Clinical Goals: Pain management   Patient Goals: Comfort      Progress made toward(s) clinical / shift goals:  Taught pt 0-10 pain scale and non-pharmacological method of pain management, encouraged to verbalize when in pain. Currently on a PCA pump.

## 2021-07-17 NOTE — DISCHARGE PLANNING
Meds-to-Beds: Discharge prescription orders listed below delivered to patient's bedside. RN assistant Le notified, she will let her RN Kaushiky know. Patient counseled declined, she has taken it before.      Corazon Berg   Home Medication Instructions RENAE:[id number]    Printed on:07/17/21 1143   Medication Information                      magnesium hydroxide (MILK OF MAGNESIA) 400 MG/5ML Suspension  Take 30 mL by mouth 1 time a day as needed (constipation).                 Kristen Michaud, Pharmacy Intern

## 2021-11-10 ENCOUNTER — TELEPHONE (OUTPATIENT)
Dept: INTERNAL MEDICINE | Facility: OTHER | Age: 46
End: 2021-11-10

## 2021-11-10 ENCOUNTER — OFFICE VISIT (OUTPATIENT)
Dept: INTERNAL MEDICINE | Facility: OTHER | Age: 46
End: 2021-11-10
Payer: MEDICAID

## 2021-11-10 VITALS
BODY MASS INDEX: 30.16 KG/M2 | OXYGEN SATURATION: 95 % | HEART RATE: 109 BPM | SYSTOLIC BLOOD PRESSURE: 142 MMHG | WEIGHT: 181 LBS | HEIGHT: 65 IN | TEMPERATURE: 98.8 F | DIASTOLIC BLOOD PRESSURE: 92 MMHG

## 2021-11-10 DIAGNOSIS — R39.81 URINARY INCONTINENCE DUE TO SEVERE PHYSICAL DISABILITY: ICD-10-CM

## 2021-11-10 DIAGNOSIS — R10.31 RIGHT LOWER QUADRANT PAIN: ICD-10-CM

## 2021-11-10 DIAGNOSIS — K90.829 SHORT BOWEL SYNDROME: Primary | ICD-10-CM

## 2021-11-10 DIAGNOSIS — N31.9 NEUROGENIC BLADDER: ICD-10-CM

## 2021-11-10 DIAGNOSIS — K90.829 SHORT BOWEL SYNDROME: ICD-10-CM

## 2021-11-10 DIAGNOSIS — F11.20 CHRONIC NARCOTIC DEPENDENCE (HCC): ICD-10-CM

## 2021-11-10 PROBLEM — N39.0 URINARY TRACT INFECTION, SITE NOT SPECIFIED: Status: ACTIVE | Noted: 2019-11-08

## 2021-11-10 PROBLEM — N93.9 VAGINAL BLEEDING: Status: ACTIVE | Noted: 2020-01-22

## 2021-11-10 PROBLEM — R87.89 OTHER ABNORMAL FINDINGS IN SPECIMENS FROM FEMALE GENITAL ORGANS: Status: ACTIVE | Noted: 2019-02-11

## 2021-11-10 PROBLEM — Z12.39 ENCOUNTER FOR OTHER SCREENING FOR MALIGNANT NEOPLASM OF BREAST: Status: ACTIVE | Noted: 2020-01-22

## 2021-11-10 PROBLEM — R92.8 ABNORMAL MAMMOGRAM: Status: ACTIVE | Noted: 2020-01-30

## 2021-11-10 PROCEDURE — 99214 OFFICE O/P EST MOD 30 MIN: CPT | Mod: GC

## 2021-11-10 RX ORDER — NITROFURANTOIN MACROCRYSTALS 50 MG/1
50 CAPSULE ORAL
Qty: 30 CAPSULE | Refills: 3 | Status: SHIPPED | OUTPATIENT
Start: 2021-11-10 | End: 2022-03-30

## 2021-11-10 RX ORDER — ESTRADIOL 0.07 MG/D
0.07 PATCH TRANSDERMAL
COMMUNITY
Start: 2021-10-27

## 2021-11-10 RX ORDER — PROGESTERONE 200 MG/1
200 CAPSULE ORAL
COMMUNITY
Start: 2021-09-24

## 2021-11-10 RX ORDER — ZOLPIDEM TARTRATE 3.5 MG/1
3.5 TABLET SUBLINGUAL NIGHTLY PRN
COMMUNITY
Start: 2021-10-01

## 2021-11-10 RX ORDER — HYDROMORPHONE HYDROCHLORIDE 2 MG/1
2 TABLET ORAL 3 TIMES DAILY
Qty: 90 TABLET | Refills: 0 | Status: SHIPPED | OUTPATIENT
Start: 2021-11-10 | End: 2021-11-30

## 2021-11-10 ASSESSMENT — ENCOUNTER SYMPTOMS
WEAKNESS: 0
BLURRED VISION: 0
HEARTBURN: 0
ABDOMINAL PAIN: 1
SHORTNESS OF BREATH: 0
DIZZINESS: 0
TREMORS: 1
PALPITATIONS: 0
VOMITING: 0
COUGH: 0
CHILLS: 0
SORE THROAT: 0
DIARRHEA: 1
NAUSEA: 0
FEVER: 0
DOUBLE VISION: 0
DIAPHORESIS: 1

## 2021-11-10 ASSESSMENT — FIBROSIS 4 INDEX: FIB4 SCORE: 1.42

## 2021-11-10 NOTE — PATIENT INSTRUCTIONS
It was great meeting you Corazon!    Today we discussed the followin) Chronic abdominal pain- have sent a refill for you dilaudid. You have requested to not go back on the oxycodone.     2) Have also refilled your script for nitrofurantoin.     Let's follow up in 3-4 weeks, sooner if needed.
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
## 2021-11-11 NOTE — ASSESSMENT & PLAN NOTE
Recently came off of oxycodone and does not wish to continue.     Would like to just continue with dilaudid and is considering trying to titrate down that dose as well.    PDMP reviewed with no red flags.    Patient signed controlled substance form.    Sent diluadid script for 1 month. Patient to follow up with pain management.     Will follow up with patient in 1 month to make sure she has a prescriber for her pain medication.

## 2021-11-11 NOTE — TELEPHONE ENCOUNTER
[facility] pharmacy call holding rx for Dilaudid contra indication with ambien and clonazepam. Please call [phone] for clarification.

## 2021-11-11 NOTE — ASSESSMENT & PLAN NOTE
Stable.   No concern for current infection.    Will refill nitrofurantoin which she takes for UTI ppx.

## 2021-11-11 NOTE — TELEPHONE ENCOUNTER
Was on hold with [facility] pharmacy for > 10 minutes. Tried calling back and was place back on hold. Will try to call again tomorrow.

## 2021-11-11 NOTE — ASSESSMENT & PLAN NOTE
Doing well except for chronic pain. No longer has a prescriber for pain medicaiton.    PDMP reviewed with no red flags.  Patient signed controlled substance agreement form.    Will write for 1 month of dilaudid. Patient waiting to get in with pain clinic.    Will follow up in 1 month to see how things are going and make sure she has someone to fills her scripts.

## 2021-11-30 ENCOUNTER — HOSPITAL ENCOUNTER (OUTPATIENT)
Facility: MEDICAL CENTER | Age: 46
End: 2021-11-30
Payer: MEDICAID

## 2021-11-30 ENCOUNTER — OFFICE VISIT (OUTPATIENT)
Dept: INTERNAL MEDICINE | Facility: OTHER | Age: 46
End: 2021-11-30
Payer: MEDICAID

## 2021-11-30 VITALS
BODY MASS INDEX: 30.12 KG/M2 | SYSTOLIC BLOOD PRESSURE: 144 MMHG | DIASTOLIC BLOOD PRESSURE: 86 MMHG | HEIGHT: 65 IN | HEART RATE: 85 BPM | TEMPERATURE: 98.8 F | OXYGEN SATURATION: 97 %

## 2021-11-30 DIAGNOSIS — Z23 NEED FOR VACCINATION: ICD-10-CM

## 2021-11-30 DIAGNOSIS — K90.829 SHORT BOWEL SYNDROME: ICD-10-CM

## 2021-11-30 DIAGNOSIS — F11.20 CHRONIC NARCOTIC DEPENDENCE (HCC): Primary | ICD-10-CM

## 2021-11-30 DIAGNOSIS — Z71.85 IMMUNIZATION COUNSELING: ICD-10-CM

## 2021-11-30 DIAGNOSIS — R10.31 RIGHT LOWER QUADRANT PAIN: ICD-10-CM

## 2021-11-30 DIAGNOSIS — N31.9 NEUROGENIC BLADDER: ICD-10-CM

## 2021-11-30 DIAGNOSIS — F11.20 CHRONIC NARCOTIC DEPENDENCE (HCC): ICD-10-CM

## 2021-11-30 PROCEDURE — 90471 IMMUNIZATION ADMIN: CPT

## 2021-11-30 PROCEDURE — 90686 IIV4 VACC NO PRSV 0.5 ML IM: CPT

## 2021-11-30 PROCEDURE — 80307 DRUG TEST PRSMV CHEM ANLYZR: CPT

## 2021-11-30 PROCEDURE — 99214 OFFICE O/P EST MOD 30 MIN: CPT | Mod: 25,GC

## 2021-11-30 RX ORDER — CLONAZEPAM 0.5 MG/1
TABLET, ORALLY DISINTEGRATING ORAL
COMMUNITY
Start: 2021-11-29 | End: 2021-11-30

## 2021-11-30 RX ORDER — HYDROMORPHONE HYDROCHLORIDE 2 MG/1
2 TABLET ORAL 4 TIMES DAILY
Qty: 120 TABLET | Refills: 0 | Status: SHIPPED | OUTPATIENT
Start: 2021-11-30 | End: 2021-12-21 | Stop reason: SDUPTHER

## 2021-11-30 ASSESSMENT — ENCOUNTER SYMPTOMS
DEPRESSION: 0
VOMITING: 0
HEADACHES: 0
ABDOMINAL PAIN: 1
WEAKNESS: 0
NAUSEA: 0
DIZZINESS: 0
CHILLS: 0
PALPITATIONS: 0
FEVER: 0
FLANK PAIN: 0

## 2021-11-30 ASSESSMENT — LIFESTYLE VARIABLES: SUBSTANCE_ABUSE: 0

## 2021-11-30 NOTE — PATIENT INSTRUCTIONS
Good to see you today Corazon!    We discussed your pain today and have increased your pain regimen to dialudid 4 x/day.    Please follow up in 2 weeks to make sure this is going ok, sooner if needed!

## 2021-11-30 NOTE — PROGRESS NOTES
Established Patient    Patient Care Team:  Kym Bridges M.D. as PCP - General (Internal Medicine)    HPI:  Corazon Berg is a 46 y.o. female who presents today with the following Chief Complaint(s): Follow up for The primary encounter diagnosis was Chronic narcotic dependence (HCC). Diagnoses of Short bowel syndrome, Neurogenic bladder-straight cath , Immunization counseling, and Need for vaccination were also pertinent to this visit.    Briefly, in 1997 had hysteroscopy for possible endometriosis, ended up puncturing posterior uterine wall and biopsying her colon. Has since had complicated history including chronic abdominal pain secondary to short bowel syndrome, neurogenic bladder, multiple bowel obstructions most recently hospitalized in July, previously with 2 different ostomies, though now no longer has ostomy present, presenting needing a refill of her dilaudid.     Previously followed by Dr. Lopes with pain management and on oxycodone and diluadid. Chicho has self weaned off oxycodone.     Currently taking dilaudid 2 mg TID and having breakthrough pain though not at consistent times. Still RLQ pain, up to 8/10, typically at a 6/10 which is tolerable. She is hoping to increase her dilaudid prescription 4 times daily.     Regarding other health maintenance issues,   She has never had her COVID vaccine- reports other providers have advised against this because she doesn't have an ilium.   She is due for her flu vaccine.  She is due for pap and mammogram- followed by Obgyn Dr. Pamela Guo on [facility]- ObGyn does women's health- she is planning to see her soon.    ROS:     Denies any new chest pain or shortness of breath.  No changes to urinary or bowel function.See HPI.  Review of Systems   Constitutional: Negative for chills, fever and malaise/fatigue.   Cardiovascular: Negative for chest pain and palpitations.   Gastrointestinal: Positive for abdominal pain. Negative for nausea and  vomiting.   Genitourinary: Negative for flank pain.        Neurogenic bladder, self caths.    Neurological: Negative for dizziness, weakness and headaches.   Psychiatric/Behavioral: Negative for depression, substance abuse and suicidal ideas.         Past Medical History:   Diagnosis Date   • Bowel perforation ([facility])    • Heart murmur     grade 2, and grade 4   • Kidney infection    • Obstruction     reversed 3/2013   • Other specified disorder of intestines     chronic diarrhea   • Other specified symptom associated with female genital organs    • Pain 3/14/13    6/10 abdomen   • Pneumonia 1992   • Pneumothorax    • PTSD (post-traumatic stress disorder)    • SBO (small bowel obstruction) ([facility])    • Sepsis, unspecified 1997&1998   • Unspecified urinary incontinence    • Urethra disorder     self caths   • Urinary bladder disorder     self caths, freq uti, and kidney infections     Social History     Tobacco Use   • Smoking status: Never Smoker   • Smokeless tobacco: Never Used   Substance Use Topics   • Alcohol use: No   • Drug use: No     Current Outpatient Medications   Medication Sig Dispense Refill   • estradiol (CLIMARA) 0.075 MG/24HR PATCH WEEKLY Place 0.075 Patches on the skin every 7 days.     • progesterone (PROMETRIUM) 200 MG capsule Take 200 mg by mouth at bedtime.     • Zolpidem Tartrate 3.5 MG SL Tab Take 3.5 mg by mouth at bedtime as needed.     • nitrofurantoin (MACRODANTIN) 50 MG Cap Take 1 Capsule by mouth 1 time a day as needed (UTI prophylaxis). 30 Capsule 3   • mirtazapine (REMERON) 15 MG TABLET DISPERSIBLE Take 7.5 mg by mouth at bedtime as needed. 1/2 tablet = 7.5 mg.   Indications: PTSD     • sertraline (ZOLOFT) 20 MG/ML concentrated solution Take 100 mg by mouth at bedtime. 5 mL = 100 mg.     • NARCAN 4 MG/0.1ML Liquid Spray 1 Spray in nose as needed.     • clonazepam (KLONOPIN) 0.5 MG Tab Take 0.5 mg by mouth 3 times a day as needed (Anxiety).     • cyanocobalamin (VITAMIN B-12) 1000 MCG/ML  "SOLN 1,000 mcg by Intramuscular route every 7 days.     • HYDROmorphone (DILAUDID) 2 MG Tab Take 1 Tablet by mouth 4 times a day for 30 days. 120 Tablet 0     No current facility-administered medications for this visit.       Physical Exam:  /86 (BP Location: Left arm, Patient Position: Sitting, BP Cuff Size: Adult)   Pulse 85   Temp 37.1 °C (98.8 °F) (Temporal)   Ht 1.651 m (5' 5\")   SpO2 97%   BMI 30.12 kg/m²   General: Well developed, well nourished female, in no distress.  Eyes: Conjuntiva without any obvious injection or erythema.   Cardiovascular: Heart is regular with no murmur  Lungs: Clear to auscultation bilaterally. No wheezes, rhonci or crackles heard. Respiratory effort is normal.  Abd: multiple well healed incisions of various ages, diffusely tender to palpation. No concerning suprapubic tenderness.  Ext: No edema  Neurological:      General: No focal deficit present.      Mental Status: She is oriented to person, place, and time.   Psychiatric:         Mood and Affect: Mood normal.         Behavior: Behavior normal.         Thought Content: Thought content normal.         Judgment: Judgment normal.     Assessment and Plan:   Problem List Items Addressed This Visit     Chronic narcotic dependence (HCC) - Primary     Pain not optimally controlled on dilaudid TID.   PDMP reviewed with no red flags.  CS agreement signed 11/10/2021  Will increase to 4 x daily      Sent diluadid script for 1 month.   Follow up in 1 month.         Relevant Orders    PAIN MANAGEMENT SCRN, UR    Short bowel syndrome     Doing well except for chronic pain.   Followed closely by GI.    PDMP reviewed with no red flags.  CS agreement form lester on 11/10/2021    Increasing dilaudid to 4 times daily    Will follow up in 1 month to see how things are going.            Neurogenic bladder-straight cath      Stable.   No concern for current infection.    Continue nitrofurantoin for UTI ppx.         Need for vaccination     " Due for COVID- patient concerned about receiving vaccination due to her complicated GI history.    Patient also due for flu vaccine and open to getting done today.          Relevant Orders    INFLUENZA VACCINE QUAD INJ (PF) (Completed)    Immunization counseling    Relevant Orders    INFLUENZA VACCINE QUAD INJ (PF) (Completed)          Return in about 3 weeks (around 12/21/2021).      Kym Bridges M.D. PGY1  [facility]

## 2021-12-01 NOTE — ASSESSMENT & PLAN NOTE
Due for COVID- patient concerned about receiving vaccination due to her complicated GI history.    Patient also due for flu vaccine and open to getting done today.

## 2021-12-01 NOTE — ASSESSMENT & PLAN NOTE
Pain not optimally controlled on dilaudid TID.   PDMP reviewed with no red flags.  CS agreement signed 11/10/2021  Will increase to 4 x daily      Sent diluadid script for 1 month.   Follow up in 1 month.

## 2021-12-01 NOTE — ASSESSMENT & PLAN NOTE
Doing well except for chronic pain.   Followed closely by GI.    PDMP reviewed with no red flags.  CS agreement form lester on 11/10/2021    Increasing dilaudid to 4 times daily    Will follow up in 1 month to see how things are going.

## 2021-12-04 LAB
1OH-MIDAZOLAM UR QL SCN: NOT DETECTED
6MAM UR QL: NOT DETECTED
7AMINOCLONAZEPAM UR QL: PRESENT
A-OH ALPRAZ UR QL: NOT DETECTED
ALPRAZ UR QL: NOT DETECTED
AMPHET UR QL SCN: NOT DETECTED
ANNOTATION COMMENT IMP: NORMAL
ANNOTATION COMMENT IMP: NORMAL
BARBITURATES UR QL: NOT DETECTED
BUPRENORPHINE UR QL: NOT DETECTED
BZE UR QL: NOT DETECTED
CARBOXYTHC UR QL: NOT DETECTED
CARISOPRODOL UR QL: NOT DETECTED
CLONAZEPAM UR QL: NOT DETECTED
CODEINE UR QL: NOT DETECTED
DIAZEPAM UR QL: NOT DETECTED
ETHYL GLUCURONIDE UR QL: NOT DETECTED
FENTANYL UR QL: NOT DETECTED
GABAPENTIN UR QL: NOT DETECTED
HYDROCODONE UR QL: NOT DETECTED
HYDROMORPHONE UR QL: PRESENT
LORAZEPAM UR QL: NOT DETECTED
MDA UR QL: NOT DETECTED
MDEA UR QL: NOT DETECTED
MDMA UR QL: NOT DETECTED
MEPERIDINE UR QL: NOT DETECTED
METHADONE UR QL: NOT DETECTED
METHAMPHET UR QL: NOT DETECTED
MIDAZOLAM UR QL SCN: NOT DETECTED
MORPHINE UR QL: NOT DETECTED
NALOXONE UR QL SCN: NOT DETECTED
NORBUPRENORPHINE UR QL CFM: NOT DETECTED
NORDIAZEPAM UR QL: NOT DETECTED
NORFENTANYL UR QL: NOT DETECTED
NORHYDROCODONE UR QL CFM: NOT DETECTED
NOROXYCODONE UR QL CFM: NOT DETECTED
NOROXYMORPH CO100 Q0458: NOT DETECTED
OXAZEPAM UR QL: NOT DETECTED
OXYCODONE UR QL: NOT DETECTED
OXYMORPHONE UR QL: NOT DETECTED
PATHOLOGY STUDY: NORMAL
PCP UR QL: NOT DETECTED
PHENTERMINE UR QL: NOT DETECTED
PPAA UR QL: NOT DETECTED
PREGABALIN UR QL SCN: NOT DETECTED
SERVICE CMNT-IMP: NORMAL
TAPENADOL OSULF CO200 Q0473: NOT DETECTED
TAPENTADOL UR QL SCN: NOT DETECTED
TEMAZEPAM UR QL: NOT DETECTED
TRAMADOL UR QL: NOT DETECTED
ZOLPIDEM PHENYL-4-CARB UR QL SCN: NOT DETECTED
ZOLPIDEM UR QL: NOT DETECTED

## 2021-12-21 ENCOUNTER — OFFICE VISIT (OUTPATIENT)
Dept: INTERNAL MEDICINE | Facility: OTHER | Age: 46
End: 2021-12-21
Payer: MEDICAID

## 2021-12-21 VITALS
BODY MASS INDEX: 30.12 KG/M2 | HEART RATE: 92 BPM | OXYGEN SATURATION: 91 % | DIASTOLIC BLOOD PRESSURE: 74 MMHG | HEIGHT: 65 IN | SYSTOLIC BLOOD PRESSURE: 122 MMHG | TEMPERATURE: 98 F

## 2021-12-21 DIAGNOSIS — G89.28 OTHER CHRONIC POSTPROCEDURAL PAIN: ICD-10-CM

## 2021-12-21 DIAGNOSIS — K90.829 SHORT BOWEL SYNDROME: ICD-10-CM

## 2021-12-21 DIAGNOSIS — Z00.00 PREVENTATIVE HEALTH CARE: ICD-10-CM

## 2021-12-21 DIAGNOSIS — R10.31 RIGHT LOWER QUADRANT PAIN: ICD-10-CM

## 2021-12-21 DIAGNOSIS — F11.20 CHRONIC NARCOTIC DEPENDENCE (HCC): ICD-10-CM

## 2021-12-21 PROBLEM — N39.0 URINARY TRACT INFECTION, SITE NOT SPECIFIED: Status: RESOLVED | Noted: 2019-11-08 | Resolved: 2021-12-21

## 2021-12-21 PROBLEM — N93.9 VAGINAL BLEEDING: Status: RESOLVED | Noted: 2020-01-22 | Resolved: 2021-12-21

## 2021-12-21 PROBLEM — R39.81 URINARY INCONTINENCE DUE TO SEVERE PHYSICAL DISABILITY: Status: RESOLVED | Noted: 2021-11-10 | Resolved: 2021-12-21

## 2021-12-21 PROCEDURE — 99214 OFFICE O/P EST MOD 30 MIN: CPT | Mod: GC

## 2021-12-21 RX ORDER — HYDROMORPHONE HYDROCHLORIDE 2 MG/1
2 TABLET ORAL 4 TIMES DAILY
Qty: 120 TABLET | Refills: 0 | Status: SHIPPED | OUTPATIENT
Start: 2021-12-21 | End: 2022-01-23

## 2021-12-21 NOTE — PATIENT INSTRUCTIONS
It was great seeing you Corazon!    Today we discussed your pain regimen. We will fill your prescription for 4 times/ day with 10 extra pills for as needed breakthrough pain.    Also recommend speaking with Dr. Lopez about trying Bentyl to help with the bowel cramping.    We discussed transitioning you to duloxetine (cymbalta) which is an SNRI to also help with pain while also covering your CPTSD. Please discuss this with your psychiatrist.

## 2021-12-21 NOTE — PROGRESS NOTES
Established Patient    Patient Care Team:  Kym Bridges M.D. as PCP - General (Internal Medicine)    HPI:  Corazon Berg is a 46 y.o. female who presents today with the following Chief Complaint(s): Follow up for Diagnoses of Chronic narcotic dependence (HCC), Short bowel syndrome, Other chronic postprocedural pain, and Preventative health care were pertinent to this visit.    Precious is here to follow-up for her chronic pain.  At our last visit we increased her Dilaudid 2 mg to 4 times a day from 3 times a day.  She states that since that time she was doing somewhat well however did increase her Dilaudid intake to 6 times a day over the past for 5 days.  She says that this has significantly reduced the amount of pain that she is currently in and has noticed a decrease in her blood pressure as a result.  Her current pain is a 5/10, prior to that she was typically running at a 7/10.     We also discussed preventative healthcare including mammogram and Pap smear for which she is due for.  And she will be seeing Dr. Pamela Guo at the end of January at which point she will be getting her Pap smear and a mammogram done.    She has not yet received her Covid vaccine.    ROS:       Review of Systems   Constitutional: Negative for chills, fever, malaise/fatigue and weight loss.   Eyes: Negative for blurred vision and double vision.   Respiratory: Negative for cough and shortness of breath.    Gastrointestinal: Positive for abdominal pain. Negative for blood in stool, constipation, nausea and vomiting.   Neurological: Negative for dizziness, tingling, weakness and headaches.   Psychiatric/Behavioral: Negative for depression and suicidal ideas. The patient is not nervous/anxious and does not have insomnia.          Past Medical History:   Diagnosis Date   • Bowel perforation (HCC)    • Elevated liver enzymes 3/26/2014   • Heart murmur     grade 2, and grade 4   • Kidney infection    •  Obstruction     reversed 3/2013   • Other specified disorder of intestines     chronic diarrhea   • Other specified symptom associated with female genital organs    • Pain 3/14/13    6/10 abdomen   • Pneumonia 1992   • Pneumothorax    • PTSD (post-traumatic stress disorder)    • SBO (small bowel obstruction) ([facility])    • Sepsis, unspecified 1997&1998   • Unspecified complication of colostomy or enterostomy 1/4/2013   • Unspecified urinary incontinence    • Urethra disorder     self caths   • Urinary bladder disorder     self caths, freq uti, and kidney infections   • Urinary incontinence due to severe physical disability 11/10/2021   • Urinary tract infection, site not specified 11/8/2019   • Vaginal bleeding 1/22/2020     Social History     Tobacco Use   • Smoking status: Never Smoker   • Smokeless tobacco: Never Used   Substance Use Topics   • Alcohol use: No   • Drug use: No     Current Outpatient Medications   Medication Sig Dispense Refill   • estradiol (CLIMARA) 0.075 MG/24HR PATCH WEEKLY Place 0.075 Patches on the skin every 7 days.     • progesterone (PROMETRIUM) 200 MG capsule Take 200 mg by mouth at bedtime.     • Zolpidem Tartrate 3.5 MG SL Tab Take 3.5 mg by mouth at bedtime as needed.     • nitrofurantoin (MACRODANTIN) 50 MG Cap Take 1 Capsule by mouth 1 time a day as needed (UTI prophylaxis). 30 Capsule 3   • mirtazapine (REMERON) 15 MG TABLET DISPERSIBLE Take 7.5 mg by mouth at bedtime as needed. 1/2 tablet = 7.5 mg.   Indications: PTSD     • sertraline (ZOLOFT) 20 MG/ML concentrated solution Take 100 mg by mouth at bedtime. 5 mL = 100 mg.     • NARCAN 4 MG/0.1ML Liquid Spray 1 Spray in nose as needed.     • clonazepam (KLONOPIN) 0.5 MG Tab Take 0.5 mg by mouth 3 times a day as needed (Anxiety).     • cyanocobalamin (VITAMIN B-12) 1000 MCG/ML SOLN 1,000 mcg by Intramuscular route every 7 days.     • HYDROmorphone (DILAUDID) 2 MG Tab Take 1 Tablet by mouth 4 times a day for 130 doses. 120 Tablet 0     No  "current facility-administered medications for this visit.       Physical Exam:  /74 (BP Location: Left arm, Patient Position: Sitting, BP Cuff Size: Adult)   Pulse 92   Temp 36.7 °C (98 °F) (Temporal)   Ht 1.651 m (5' 5\")   SpO2 91%   BMI 30.12 kg/m²   General: Well developed, well nourished female, in no distress.  Eyes: Conjuntiva without any obvious injection or erythema.   Cardiovascular: Heart is regular with out murmur  Lungs: Clear to auscultation bilaterally. No wheezes, rhonci or crackles heard. Respiratory effort is normal.  Abd: Soft, non-tender  Ext: No edema      Assessment and Plan:   Problem List Items Addressed This Visit     Chronic narcotic dependence (HCC) (Chronic)     Pain not optimally controlled on dilaudid 4 times daily.   PDMP reviewed with no red flags.  CS agreement signed 11/10/2021  Discussed with patient reluctancy to increasing her daily scheduled dose to 6 times a day.  Agree that ideally she should only be on 1 agent rather than multiple.  She is not interested in adding oxycodone back onto her regimen which is reasonable.  We will refill her Dilaudid for 4 times daily with 10 extra pills for breakthrough pain.  However encouraged her to use this sparingly.  Discussed that unfortunately the more she uses the Dilaudid her tolerance level will go up and she will continue to require more and more.  Also discussed alternative options including duloxetine and Bentyl.  She will discuss these medications with her GI doctor and psychiatrist.    Sent diluadid script for 1 month.   Follow up in 1 month.         Other chronic postprocedural pain (Chronic)     Dilaudid 4 times a day is not fully covering her pain.  She may be a good candidate for trying duloxetine.  Currently she is on sertraline for PTSD.  Recommend that she discuss transitioning over to duloxetine with her psychiatrist, as she may find significant benefit from the SNRI from a pain standpoint in addition to her mood " and PTSD.         Short bowel syndrome (Chronic)     Doing well except for chronic pain not fully controlled on Dilaudid 4 times daily.  Discussed adding Bentyl for antispasmodic benefit.  Recommend that she discuss this medication with her GI doctor.  Followed closely by GI.   PDMP reviewed with no red flags.  CS agreement form lester on 11/10/2021    Continue Dilaudid 4 times daily    Will follow up in 1 month to see how things are going.            Preventative health care     She will get her Pap and mammogram done in January by her OB/GYN.               Return in about 1 month (around 1/21/2022) for chronic pain.      Kym Bridges M.D. PGY1  [facility]

## 2021-12-22 ASSESSMENT — ENCOUNTER SYMPTOMS
CHILLS: 0
DEPRESSION: 0
CONSTIPATION: 0
SHORTNESS OF BREATH: 0
INSOMNIA: 0
BLOOD IN STOOL: 0
VOMITING: 0
NERVOUS/ANXIOUS: 0
ABDOMINAL PAIN: 1
DIZZINESS: 0
NAUSEA: 0
BLURRED VISION: 0
COUGH: 0
HEADACHES: 0
WEAKNESS: 0
DOUBLE VISION: 0
TINGLING: 0
WEIGHT LOSS: 0
FEVER: 0

## 2021-12-23 NOTE — ASSESSMENT & PLAN NOTE
Pain not optimally controlled on dilaudid 4 times daily.   PDMP reviewed with no red flags.  CS agreement signed 11/10/2021  Discussed with patient reluctancy to increasing her daily scheduled dose to 6 times a day.  Agree that ideally she should only be on 1 agent rather than multiple.  She is not interested in adding oxycodone back onto her regimen which is reasonable.  We will refill her Dilaudid for 4 times daily with 10 extra pills for breakthrough pain.  However encouraged her to use this sparingly.  Discussed that unfortunately the more she uses the Dilaudid her tolerance level will go up and she will continue to require more and more.  Also discussed alternative options including duloxetine and Bentyl.  She will discuss these medications with her GI doctor and psychiatrist.    Sent savannauadid script for 1 month.   Follow up in 1 month.

## 2021-12-23 NOTE — ASSESSMENT & PLAN NOTE
Doing well except for chronic pain not fully controlled on Dilaudid 4 times daily.  Discussed adding Bentyl for antispasmodic benefit.  Recommend that she discuss this medication with her GI doctor.  Followed closely by GI.   PDMP reviewed with no red flags.  CS agreement form lester on 11/10/2021    Continue Dilaudid 4 times daily    Will follow up in 1 month to see how things are going.

## 2021-12-23 NOTE — ASSESSMENT & PLAN NOTE
Dilaudid 4 times a day is not fully covering her pain.  She may be a good candidate for trying duloxetine.  Currently she is on sertraline for PTSD.  Recommend that she discuss transitioning over to duloxetine with her psychiatrist, as she may find significant benefit from the SNRI from a pain standpoint in addition to her mood and PTSD.

## 2022-01-21 ENCOUNTER — APPOINTMENT (OUTPATIENT)
Dept: INTERNAL MEDICINE | Facility: OTHER | Age: 47
End: 2022-01-21
Payer: MEDICAID

## 2022-01-26 ENCOUNTER — OFFICE VISIT (OUTPATIENT)
Dept: INTERNAL MEDICINE | Facility: OTHER | Age: 47
End: 2022-01-26
Payer: MEDICAID

## 2022-01-26 VITALS
HEIGHT: 65 IN | TEMPERATURE: 99.2 F | BODY MASS INDEX: 30.12 KG/M2 | SYSTOLIC BLOOD PRESSURE: 165 MMHG | HEART RATE: 71 BPM | DIASTOLIC BLOOD PRESSURE: 108 MMHG

## 2022-01-26 DIAGNOSIS — F11.20 CHRONIC NARCOTIC DEPENDENCE (HCC): ICD-10-CM

## 2022-01-26 DIAGNOSIS — R10.31 RIGHT LOWER QUADRANT PAIN: ICD-10-CM

## 2022-01-26 PROCEDURE — 99214 OFFICE O/P EST MOD 30 MIN: CPT | Mod: GC

## 2022-01-26 RX ORDER — CLONAZEPAM 0.5 MG/1
TABLET, ORALLY DISINTEGRATING ORAL
COMMUNITY
Start: 2021-12-30 | End: 2022-03-02

## 2022-01-26 RX ORDER — HYDROMORPHONE HYDROCHLORIDE 2 MG/1
2 TABLET ORAL 4 TIMES DAILY
Qty: 135 TABLET | Refills: 0 | Status: SHIPPED | OUTPATIENT
Start: 2022-01-26 | End: 2022-02-25

## 2022-01-26 RX ORDER — DICYCLOMINE HYDROCHLORIDE 10 MG/1
10 CAPSULE ORAL 2 TIMES DAILY
Qty: 60 CAPSULE | Refills: 2 | Status: SHIPPED | OUTPATIENT
Start: 2022-01-26 | End: 2022-03-02

## 2022-01-26 NOTE — PROGRESS NOTES
Date of Service:      CC: Follow-up for abdominal pain    HPI:  Corazon Berg  is a 46 y.o. female with a PMH of  Chronic narcotic dependence, short bowel syndrome, and chronic postprocedural pain. She is following up for her chronic pain. Pain has consistently been around 5/10 with current pain management. However her pain is 6/10 today because she delayed taking medications in order to drive to the clinic. The Dilaudid she has been taking four times per day, with occasional extra pill for breakthrough. The medication controls her pain for approximately three hours. She tries to stay ahead of her pain.     Patient has not started taking any Bentyl, because she is in the process of changing GI physicians. Also, her psychiatrist would like to keep her on the same medical regimen and not start duloxetine.       Review of systems:  Review of Systems   Constitutional: Negative for chills, fever, malaise/fatigue and weight loss.   HENT: Negative.    Musculoskeletal:        Abdominal pain   Neurological: Negative for dizziness and headaches.   Psychiatric/Behavioral: Negative.         Past Medical History:  Patient Active Problem List    Diagnosis Date Noted   • Other chronic postprocedural pain 12/21/2021   • Preventative health care 12/21/2021   • Need for vaccination 11/30/2021   • Immunization counseling 11/30/2021   • Abnormal mammogram 01/30/2020   • Encounter for other screening for malignant neoplasm of breast 01/22/2020   • Pap smear abnormality of cervix/human papillomavirus (HPV) positive 02/11/2019   • Other abnormal findings in specimens from female genital organs 02/11/2019   • HPV (human papilloma virus) infection 09/26/2018   • Bacterial vaginosis 09/26/2018   • Adenoma of left adrenal gland 07/17/2018   • Simple ovarian cyst 07/17/2018   • Neurogenic bladder-straight cath  03/26/2014   • PTSD (post-traumatic stress disorder) 03/26/2014   • Chronic narcotic dependence (HCC) 03/26/2014   • Short  "bowel syndrome 03/18/2013   • Right lower quadrant pain 03/18/2013       Past Surgical History:    has a past surgical history that includes colostomy (6/11/12); cath place groshong; recovery (1/4/2013); other (5/25/12); other abdominal surgery; appendectomy; other abdominal surgery; other abdominal surgery (6/11/2012); exploratory laparotomy (3/18/2013); lysis adhesions general (3/18/2013); colon resection (3/18/2013); colostomy takedown (3/18/2013); and bartholin gland excision (3/7/2014).    Medications:  Current Outpatient Medications   Medication Sig Dispense Refill   • dicyclomine (BENTYL) 10 MG Cap Take 1 Capsule by mouth 2 times a day. 60 Capsule 2   • HYDROmorphone (DILAUDID) 2 MG Tab Take 1 Tablet by mouth 4 times a day for 30 days. 135 Tablet 0   • Clonazepam 0.5 MG TABLET DISPERSIBLE DISSOLVE 1 TABLET IN MOUTH THREE TIMES DAILY AS NEEDED FOR ANXIETY     • estradiol (CLIMARA) 0.075 MG/24HR PATCH WEEKLY Place 0.075 Patches on the skin every 7 days.     • progesterone (PROMETRIUM) 200 MG capsule Take 200 mg by mouth at bedtime.     • Zolpidem Tartrate 3.5 MG SL Tab Take 3.5 mg by mouth at bedtime as needed.     • nitrofurantoin (MACRODANTIN) 50 MG Cap Take 1 Capsule by mouth 1 time a day as needed (UTI prophylaxis). 30 Capsule 3   • mirtazapine (REMERON) 15 MG TABLET DISPERSIBLE Take 7.5 mg by mouth at bedtime as needed. 1/2 tablet = 7.5 mg.   Indications: PTSD     • sertraline (ZOLOFT) 20 MG/ML concentrated solution Take 100 mg by mouth at bedtime. 5 mL = 100 mg.     • NARCAN 4 MG/0.1ML Liquid Spray 1 Spray in nose as needed.     • clonazepam (KLONOPIN) 0.5 MG Tab Take 0.5 mg by mouth 3 times a day as needed (Anxiety).     • cyanocobalamin (VITAMIN B-12) 1000 MCG/ML SOLN 1,000 mcg by Intramuscular route every 7 days.       No current facility-administered medications for this visit.       Allergies:  Allergies   Allergen Reactions   • Sammy-1 [Lidocaine Hcl] Anaphylaxis     Topical or injectable, \"subq " "numbing anesthetics\"  \"Causes Methemoglobinemia.\"   • Lidocaine Hcl Anaphylaxis   • Pcn [Penicillins] Rash             Family History:   family history is not on file.     Social History:    Social History     Tobacco Use   • Smoking status: Never Smoker   • Smokeless tobacco: Never Used   Substance Use Topics   • Alcohol use: No   • Drug use: No     Employment: Disability      Physical Exam:  Vitals:    01/26/22 1457   BP: (!) 165/108   Pulse: 71   Temp: 37.3 °C (99.2 °F)     Body mass index is 30.12 kg/m².  Physical Exam  Constitutional:       General: She is not in acute distress.     Appearance: She is normal weight. She is not ill-appearing.   HENT:      Head: Normocephalic and atraumatic.      Nose: No congestion or rhinorrhea.   Eyes:      Extraocular Movements: Extraocular movements intact.      Conjunctiva/sclera: Conjunctivae normal.      Pupils: Pupils are equal, round, and reactive to light.   Cardiovascular:      Rate and Rhythm: Regular rhythm.      Pulses: Normal pulses.      Heart sounds: Normal heart sounds. No murmur heard.  No gallop.    Pulmonary:      Effort: Pulmonary effort is normal. No respiratory distress.      Breath sounds: Normal breath sounds. No wheezing or rales.   Abdominal:      General: Abdomen is flat. A surgical scar is present. There is distension.      Tenderness: There is abdominal tenderness in the right lower quadrant. There is guarding.      Hernia: No hernia is present. There is no hernia in the umbilical area or ventral area.      Comments: Multiple surgical scars.   Musculoskeletal:         General: Normal range of motion.      Right lower leg: No edema.      Left lower leg: No edema.   Skin:     General: Skin is warm and dry.   Neurological:      Mental Status: She is alert.      Cranial Nerves: No cranial nerve deficit.      Motor: No weakness.      Gait: Gait normal.   Psychiatric:         Mood and Affect: Mood normal.      "     Labs:  none    Imaging:  none    Assessment/Plan:  Right lower quadrant pain  Patient has been on current dilaudid regimen for over one month. Her pain appears more controled to prior visit, but still has pain in the 5/10. She states that she has been denied from multiple pain clinics. UDS was taken in office. Last UDS was only positive for current medications. Will continue to consider other avenues to help patient control her current pain.     Plan:  -2 mg of dilaudid, oral, four times per day, with 15 for breakthrough pain  -Bentyl 10 mg, BID  -UDS           All imaging results and lab results and consult notes are reviewed at this visit.  Followup: No follow-ups on file.    Lorena Major MD  Internal Medicine PGY-1

## 2022-01-27 ASSESSMENT — ENCOUNTER SYMPTOMS
PSYCHIATRIC NEGATIVE: 1
WEIGHT LOSS: 0
HEADACHES: 0
CHILLS: 0
FEVER: 0
DIZZINESS: 0

## 2022-01-28 NOTE — ASSESSMENT & PLAN NOTE
Patient has been on current dilaudid regimen for over one month. Her pain appears more controled to prior visit, but still has pain in the 5/10. She states that she has been denied from multiple pain clinics. UDS was taken in office. Last UDS was only positive for current medications. Will continue to consider other avenues to help patient control her current pain.     Plan:  -2 mg of dilaudid, oral, four times per day, with 15 for breakthrough pain  -Bentyl 10 mg, BID  -UDS

## 2022-02-02 ENCOUNTER — TELEPHONE (OUTPATIENT)
Dept: INTERNAL MEDICINE | Facility: OTHER | Age: 47
End: 2022-02-02

## 2022-02-02 NOTE — TELEPHONE ENCOUNTER
Called [facility] this morning and spoke with Jim, provided the codes required and they are running the UDS.

## 2022-02-02 NOTE — TELEPHONE ENCOUNTER
----- Message from Lorena Major M.D. sent at 2/2/2022  1:58 PM PST -----  Hi all,     Could you call this group and indicate that this was a Urine drug screen for controlled substance monitoring?    Thank you,  Lorena   ----- Message -----  From: Int, [facility]  Sent: 2/2/2022  11:05 AM PST  To: Lorena Major M.D.

## 2022-02-24 ENCOUNTER — APPOINTMENT (OUTPATIENT)
Dept: INTERNAL MEDICINE | Facility: OTHER | Age: 47
End: 2022-02-24
Payer: MEDICAID

## 2022-03-02 ENCOUNTER — OFFICE VISIT (OUTPATIENT)
Dept: INTERNAL MEDICINE | Facility: OTHER | Age: 47
End: 2022-03-02
Payer: MEDICAID

## 2022-03-02 VITALS
HEART RATE: 80 BPM | OXYGEN SATURATION: 96 % | DIASTOLIC BLOOD PRESSURE: 108 MMHG | SYSTOLIC BLOOD PRESSURE: 142 MMHG | TEMPERATURE: 98.5 F

## 2022-03-02 DIAGNOSIS — Z12.31 ENCOUNTER FOR SCREENING MAMMOGRAM FOR MALIGNANT NEOPLASM OF BREAST: ICD-10-CM

## 2022-03-02 DIAGNOSIS — R03.0 ELEVATED BP WITHOUT DIAGNOSIS OF HYPERTENSION: ICD-10-CM

## 2022-03-02 DIAGNOSIS — G89.28 OTHER CHRONIC POSTPROCEDURAL PAIN: ICD-10-CM

## 2022-03-02 PROCEDURE — 99213 OFFICE O/P EST LOW 20 MIN: CPT | Mod: GE | Performed by: STUDENT IN AN ORGANIZED HEALTH CARE EDUCATION/TRAINING PROGRAM

## 2022-03-02 RX ORDER — HYDROMORPHONE HYDROCHLORIDE 2 MG/1
2 TABLET ORAL
Qty: 15 TABLET | Refills: 0 | Status: SHIPPED | OUTPATIENT
Start: 2022-03-02 | End: 2022-03-30

## 2022-03-02 RX ORDER — HYDROMORPHONE HYDROCHLORIDE 2 MG/1
2 TABLET ORAL 4 TIMES DAILY
Qty: 120 TABLET | Refills: 0 | Status: SHIPPED | OUTPATIENT
Start: 2022-03-02 | End: 2022-03-30

## 2022-03-02 ASSESSMENT — PATIENT HEALTH QUESTIONNAIRE - PHQ9
SUM OF ALL RESPONSES TO PHQ QUESTIONS 1-9: 5
CLINICAL INTERPRETATION OF PHQ2 SCORE: 3
5. POOR APPETITE OR OVEREATING: 0 - NOT AT ALL

## 2022-03-02 ASSESSMENT — ENCOUNTER SYMPTOMS
DIZZINESS: 0
FEVER: 0
WEIGHT LOSS: 0
PSYCHIATRIC NEGATIVE: 1
CHILLS: 0
HEADACHES: 0

## 2022-03-02 NOTE — PROGRESS NOTES
CC: Follow-up     HPI:  Corazon Berg  is a 46 y.o. 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 Gutierrez and Dr. Lopez, and has had recurrent bowel obstruction.    #chronic pain  Patient states that current pain regimen works well for her.  Gi appointment in 3 weeks.  She could not tolerate bentyl due to bloating.    #elevated bp  BP elevated in clinic today.   -may be due to pain. Patient cannot afford blood pressure machine now, but she states that she may try to save money to buy a bp cuff.    She states that she has not taken clonazepam much recently and urine drug screen was negative for benzodiazepine.    Review of systems:  Review of Systems   Constitutional: Negative for chills, fever, malaise/fatigue and weight loss.   HENT: Negative.    Musculoskeletal:        Abdominal pain   Neurological: Negative for dizziness and headaches.   Psychiatric/Behavioral: Negative.         Past Medical History:  Patient Active Problem List    Diagnosis Date Noted   • Other chronic postprocedural pain 12/21/2021   • Preventative health care 12/21/2021   • Need for vaccination 11/30/2021   • Immunization counseling 11/30/2021   • Abnormal mammogram 01/30/2020   • Encounter for other screening for malignant neoplasm of breast 01/22/2020   • Pap smear abnormality of cervix/human papillomavirus (HPV) positive 02/11/2019   • Other abnormal findings in specimens from female genital organs 02/11/2019   • HPV (human papilloma virus) infection 09/26/2018   • Bacterial vaginosis 09/26/2018   • Adenoma of left adrenal gland 07/17/2018   • Simple ovarian cyst 07/17/2018   • Neurogenic bladder-straight cath  03/26/2014   • PTSD (post-traumatic stress disorder) 03/26/2014   • Chronic  narcotic dependence (HCC) 03/26/2014   • Short bowel syndrome 03/18/2013   • Right lower quadrant pain 03/18/2013       Past Surgical History:    has a past surgical history that includes colostomy (6/11/12); cath place groshong; recovery (1/4/2013); other (5/25/12); other abdominal surgery; appendectomy; other abdominal surgery; other abdominal surgery (6/11/2012); exploratory laparotomy (3/18/2013); lysis adhesions general (3/18/2013); colon resection (3/18/2013); colostomy takedown (3/18/2013); and bartholin gland excision (3/7/2014).    Medications:  Current Outpatient Medications   Medication Sig Dispense Refill   • HYDROmorphone (DILAUDID) 2 MG Tab Take 1 Tablet by mouth 4 times a day for 30 days. 120 Tablet 0   • HYDROmorphone (DILAUDID) 2 MG Tab Take 1 Tablet by mouth 1 time a day as needed for Severe Pain (for breakthrough pain) for up to 30 days. 15 Tablet 0   • estradiol (CLIMARA) 0.075 MG/24HR PATCH WEEKLY Place 0.075 Patches on the skin every 7 days.     • progesterone (PROMETRIUM) 200 MG capsule Take 200 mg by mouth at bedtime.     • Zolpidem Tartrate 3.5 MG SL Tab Take 3.5 mg by mouth at bedtime as needed.     • nitrofurantoin (MACRODANTIN) 50 MG Cap Take 1 Capsule by mouth 1 time a day as needed (UTI prophylaxis). 30 Capsule 3   • mirtazapine (REMERON) 15 MG TABLET DISPERSIBLE Take 7.5 mg by mouth at bedtime as needed. 1/2 tablet = 7.5 mg.   Indications: PTSD     • sertraline (ZOLOFT) 20 MG/ML concentrated solution Take 100 mg by mouth at bedtime. 5 mL = 100 mg.     • clonazepam (KLONOPIN) 0.5 MG Tab Take 0.5 mg by mouth 3 times a day as needed (Anxiety).     • cyanocobalamin (VITAMIN B-12) 1000 MCG/ML SOLN Inject 1,000 mcg into the shoulder, thigh, or buttocks every 7 days.     • NARCAN 4 MG/0.1ML Liquid Spray 1 Spray in nose as needed.       No current facility-administered medications for this visit.       Allergies:  Allergies   Allergen Reactions   • Sammy-1 [Lidocaine Hcl] Anaphylaxis      "Topical or injectable, \"subq numbing anesthetics\"  \"Causes Methemoglobinemia.\"   • Lidocaine Hcl Anaphylaxis   • Pcn [Penicillins] Rash             Family History:   family history is not on file.     Social History:    Social History     Tobacco Use   • Smoking status: Never Smoker   • Smokeless tobacco: Never Used   Substance Use Topics   • Alcohol use: No   • Drug use: No     Employment: Disability      Physical Exam:  Vitals:    03/02/22 1407   BP: 142/108   Pulse: 80   Temp: 36.9 °C (98.5 °F)   SpO2: 96%     There is no height or weight on file to calculate BMI.  Physical Exam  Constitutional:       General: She is not in acute distress.     Appearance: She is normal weight. She is not ill-appearing.   HENT:      Head: Normocephalic and atraumatic.      Nose: No congestion or rhinorrhea.   Eyes:      Extraocular Movements: Extraocular movements intact.      Conjunctiva/sclera: Conjunctivae normal.      Pupils: Pupils are equal, round, and reactive to light.   Cardiovascular:      Rate and Rhythm: Regular rhythm.      Pulses: Normal pulses.      Heart sounds: Normal heart sounds. No murmur heard.    No gallop.   Pulmonary:      Effort: Pulmonary effort is normal. No respiratory distress.      Breath sounds: Normal breath sounds. No wheezing or rales.   Abdominal:      General: Abdomen is flat. A surgical scar is present. There is no distension.      Tenderness: There is abdominal tenderness in the right lower quadrant.      Hernia: No hernia is present. There is no hernia in the umbilical area or ventral area.      Comments: Multiple surgical scars. Guarding in suprapubic region.   Musculoskeletal:         General: Normal range of motion.      Right lower leg: No edema.      Left lower leg: No edema.   Skin:     General: Skin is warm and dry.   Neurological:      Mental Status: She is alert.      Cranial Nerves: No cranial nerve deficit.      Motor: No weakness.      Gait: Gait normal.   Psychiatric:         " Mood and Affect: Mood normal.          Labs:  none    Imaging:  none    Assessment/Plan:    #Other chronic postprocedural pain  Patient states that current pain regimen works well for her.    - HYDROmorphone (DILAUDID) 2 MG Tab; Take 1 Tablet by mouth 4 times a day for 30 days.  Dispense: 120 Tablet; Refill: 0  - HYDROmorphone (DILAUDID) 2 MG Tab; Take 1 Tablet by mouth 1 time a day as needed for Severe Pain (for breakthrough pain) for up to 30 days.  Dispense: 15 Tablet; Refill: 0    #Elevated BP  BP elevated in clinic today.   -may be due to pain. Patient cannot afford blood pressure machine now, but she states that she may try to save money to buy a bp cuff    #Encounter for screening mammogram for malignant neoplasm of breast    - [id number]; Future      Followup: Return in about 4 weeks (around 3/30/2022).

## 2022-03-02 NOTE — PATIENT INSTRUCTIONS
-Follow up in 4 weeks.  -get mammogram done.  -Measure blood pressure once daily at the same time of the day. No smoking, alcohol, drugs before measuring blood pressure.  Rest for 10 minutes before measuring blood pressure with feet down on the ground and arm at heart level in sitting position. Measure blood pressure for 2 weeks before next visit. Bring the blood pressure log on next visit.

## 2022-03-30 ENCOUNTER — OFFICE VISIT (OUTPATIENT)
Dept: INTERNAL MEDICINE | Facility: OTHER | Age: 47
End: 2022-03-30
Payer: MEDICAID

## 2022-03-30 VITALS
SYSTOLIC BLOOD PRESSURE: 161 MMHG | TEMPERATURE: 99.3 F | HEART RATE: 76 BPM | DIASTOLIC BLOOD PRESSURE: 109 MMHG | BODY MASS INDEX: 30.12 KG/M2 | HEIGHT: 65 IN | OXYGEN SATURATION: 95 %

## 2022-03-30 DIAGNOSIS — F11.20 CHRONIC NARCOTIC DEPENDENCE (HCC): Chronic | ICD-10-CM

## 2022-03-30 DIAGNOSIS — K90.829 SHORT BOWEL SYNDROME: Chronic | ICD-10-CM

## 2022-03-30 DIAGNOSIS — G89.28 OTHER CHRONIC POSTPROCEDURAL PAIN: Chronic | ICD-10-CM

## 2022-03-30 PROCEDURE — 99214 OFFICE O/P EST MOD 30 MIN: CPT | Mod: GC | Performed by: STUDENT IN AN ORGANIZED HEALTH CARE EDUCATION/TRAINING PROGRAM

## 2022-03-30 RX ORDER — HYDROMORPHONE HYDROCHLORIDE 2 MG/1
2 TABLET ORAL 4 TIMES DAILY
Qty: 135 TABLET | Refills: 0 | Status: SHIPPED | OUTPATIENT
Start: 2022-03-30 | End: 2022-04-26 | Stop reason: SDUPTHER

## 2022-03-31 NOTE — PROGRESS NOTES
Chief Complaint   Patient presents with   • Pain     Pain management, follow up       HISTORY OF PRESENT ILLNESS: Corazon Berg is a 46 y.o. female established patient who presents today for medication refill.  PMHx significant for complex surgical history including liver abscess and multiple colonic resections secondary to complications of gynecological procedure over 20 years ago resulting in chronic narcotic dependence, short-bowel syndrome, chronic postprocedural pain.    1. Other chronic postprocedural pain  2. Chronic narcotic dependence (HCC)  3. Short bowel syndrome  Has been on current regimen of Dilaudid (2 mg 4 times daily) since December.  She reports pain is better controlled controlled on current regimen and has been receiving 15 additional pills each month for breakthrough pain.  States the pain is usually less than 5/10 with current management.  Previously brought in multiple denial letters from referrals to pain management.  Also receiving prescription for clonazepam and zolpidem.  Last drug screen negative for benzodiazepines, positive for hydromorphone.    4. Elevated blood pressure  Reports she has not taken Dilaudid in 36 hours and blood pressure is usually normal when on medication.  161/109 today in clinic.    Past Medical History:   Diagnosis Date   • Bowel perforation (HCC)    • Elevated liver enzymes 3/26/2014   • Heart murmur     grade 2, and grade 4   • Kidney infection    • Obstruction     reversed 3/2013   • Other specified disorder of intestines     chronic diarrhea   • Other specified symptom associated with female genital organs    • Pain 3/14/13    6/10 abdomen   • Pneumonia 1992   • Pneumothorax    • PTSD (post-traumatic stress disorder)    • SBO (small bowel obstruction) ([facility])    • Sepsis, unspecified 1997&1998   • Unspecified complication of colostomy or enterostomy 1/4/2013   • Unspecified urinary incontinence    • Urethra disorder     self caths   • Urinary bladder  disorder     self caths, freq uti, and kidney infections   • Urinary incontinence due to severe physical disability 11/10/2021   • Urinary tract infection, site not specified 11/8/2019   • Vaginal bleeding 1/22/2020       Patient Active Problem List    Diagnosis Date Noted   • Elevated BP without diagnosis of hypertension 03/02/2022   • Other chronic postprocedural pain 12/21/2021   • Preventative health care 12/21/2021   • Need for vaccination 11/30/2021   • Immunization counseling 11/30/2021   • Abnormal mammogram 01/30/2020   • Encounter for other screening for malignant neoplasm of breast 01/22/2020   • Pap smear abnormality of cervix/human papillomavirus (HPV) positive 02/11/2019   • Other abnormal findings in specimens from female genital organs 02/11/2019   • HPV (human papilloma virus) infection 09/26/2018   • Bacterial vaginosis 09/26/2018   • Adenoma of left adrenal gland 07/17/2018   • Simple ovarian cyst 07/17/2018   • Neurogenic bladder-straight cath  03/26/2014   • PTSD (post-traumatic stress disorder) 03/26/2014   • Chronic narcotic dependence (HCC) 03/26/2014   • Short bowel syndrome 03/18/2013   • Right lower quadrant pain 03/18/2013       Allergies:Sammy-1 [lidocaine hcl], Lidocaine hcl, and Pcn [penicillins]    Current Outpatient Medications   Medication Sig Dispense Refill   • HYDROmorphone (DILAUDID) 2 MG Tab Take 1 Tablet by mouth 4 times a day for 30 days. 4 times daily for 30 days plus 15 additional tablets for breakthrough pain. 135 Tablet 0   • estradiol (CLIMARA) 0.075 MG/24HR PATCH WEEKLY Place 0.075 Patches on the skin every 7 days.     • progesterone (PROMETRIUM) 200 MG capsule Take 200 mg by mouth at bedtime.     • Zolpidem Tartrate 3.5 MG SL Tab Take 3.5 mg by mouth at bedtime as needed.     • mirtazapine (REMERON) 15 MG TABLET DISPERSIBLE Take 7.5 mg by mouth at bedtime as needed. 1/2 tablet = 7.5 mg.   Indications: PTSD     • sertraline (ZOLOFT) 20 MG/ML concentrated solution Take  "100 mg by mouth at bedtime. 5 mL = 100 mg.     • NARCAN 4 MG/0.1ML Liquid Spray 1 Spray in nose as needed.     • clonazepam (KLONOPIN) 0.5 MG Tab Take 0.5 mg by mouth 3 times a day as needed (Anxiety).     • cyanocobalamin (VITAMIN B-12) 1000 MCG/ML SOLN Inject 1,000 mcg into the shoulder, thigh, or buttocks every 7 days.       No current facility-administered medications for this visit.       Social History     Tobacco Use   • Smoking status: Never Smoker   • Smokeless tobacco: Never Used   Vaping Use   • Vaping Use: Never used   Substance Use Topics   • Alcohol use: No   • Drug use: No       History reviewed. No pertinent family history.      Review of Systems   ROS  As above in HPI.  All other systems reviewed and negative.    Exam:  BP (!) 161/109 (BP Location: Right arm, Patient Position: Sitting, BP Cuff Size: Adult)   Pulse 76   Temp 37.4 °C (99.3 °F) (Temporal)   Ht 1.651 m (5' 5\")   SpO2 95%  Body mass index is 30.12 kg/m².    Constitutional:  NAD, well appearing.  Discharged  HEENT:   NC/AT  Cardiovascular: RRR.   No m/r/g. No carotid bruits.       Lungs:   CTAB, no w/r/r, no respiratory distress.  Abdomen: Multiple surgical scars from previous abdominal interventions.  Some diffuse tenderness to palpation.  + BS, no masses, no suprapubic tenderness, no hepatomegaly.  Extremities:  2+ DP and radial pulses bilaterally.  No c/c/e.  Skin:  Warm and dry.    Neurologic: Alert & oriented x 3, CN II-XII grossly intact, strength and sensation grossly intact.  No focal deficits noted.  Psychiatric:  Affect normal, mood normal, judgment normal.    Assessment/Plan:     1. Other chronic postprocedural pain  2. Chronic narcotic dependence (HCC)  3. Short bowel syndrome  Will refill hydromorphone today.  Current regimen without changes.  See below.    Will make additional referral to pain management solely for medication management.  At next visit, please make sure to  patient regarding timing of medication " "refills.  It seems like use is escalating with time.  Previous refill for 30 days was written on 03/02 with 15 additional tablets that are meant for breakthrough pain.  Patient reported running out on 03/29.  She went through 135 tablets in 27 days and the current regimen is supposed to be 120 tablets in 30 days with 15 \"emergency tablets\".  We need to make these last longer. Goal is to decrease use, not advance days on refills.    - HYDROmorphone (DILAUDID) 2 MG Tab; Take 1 Tablet by mouth 4 times a day for 30 days. 4 times daily for 30 days plus 15 additional tablets for breakthrough pain.  Dispense: 135 Tablet; Refill: 0  - Referral to Pain Management    UDS at next visit.    All imaging results and lab results and consult notes are reviewed at this visit.  Followup: No follow-ups on file.    "

## 2022-04-26 ENCOUNTER — OFFICE VISIT (OUTPATIENT)
Dept: INTERNAL MEDICINE | Facility: OTHER | Age: 47
End: 2022-04-26
Payer: MEDICAID

## 2022-04-26 VITALS
DIASTOLIC BLOOD PRESSURE: 93 MMHG | HEIGHT: 65 IN | HEART RATE: 75 BPM | BODY MASS INDEX: 30.12 KG/M2 | OXYGEN SATURATION: 95 % | TEMPERATURE: 98.6 F | SYSTOLIC BLOOD PRESSURE: 139 MMHG

## 2022-04-26 DIAGNOSIS — G89.28 OTHER CHRONIC POSTPROCEDURAL PAIN: Chronic | ICD-10-CM

## 2022-04-26 DIAGNOSIS — F11.20 CHRONIC NARCOTIC DEPENDENCE (HCC): Chronic | ICD-10-CM

## 2022-04-26 DIAGNOSIS — T14.8XXA WOUND OF SKIN: ICD-10-CM

## 2022-04-26 PROCEDURE — 99213 OFFICE O/P EST LOW 20 MIN: CPT | Mod: GE | Performed by: STUDENT IN AN ORGANIZED HEALTH CARE EDUCATION/TRAINING PROGRAM

## 2022-04-26 RX ORDER — HYDROMORPHONE HYDROCHLORIDE 2 MG/1
2 TABLET ORAL 4 TIMES DAILY
Qty: 135 TABLET | Refills: 0 | Status: SHIPPED | OUTPATIENT
Start: 2022-05-01 | End: 2022-05-31

## 2022-04-26 ASSESSMENT — PATIENT HEALTH QUESTIONNAIRE - PHQ9: CLINICAL INTERPRETATION OF PHQ2 SCORE: 0

## 2022-04-26 NOTE — PATIENT INSTRUCTIONS
Keep the wound clean, apply OTC neosporin as instructed.   If the redness expands or wound worsens you will need to be seen in our office.   You pain meds will be filled for 30 days.

## 2022-04-28 PROBLEM — T14.8XXA WOUND OF SKIN: Status: ACTIVE | Noted: 2022-04-28

## 2022-04-28 ASSESSMENT — ENCOUNTER SYMPTOMS
VOMITING: 0
PALPITATIONS: 0
NEUROLOGICAL NEGATIVE: 1
EYE PAIN: 0
NAUSEA: 0
PHOTOPHOBIA: 0
FEVER: 0
CHILLS: 0
DOUBLE VISION: 0
HEMOPTYSIS: 0
MUSCULOSKELETAL NEGATIVE: 1

## 2022-04-28 NOTE — PROGRESS NOTES
Established Patient    Chief Complaint   Patient presents with   • Chronic Opiate Therapy     Medication refills today       HPI:   Corazon Berg is a 46 y.o. female established patient who presents today for medication refill.  PMHx significant for complex surgical history including liver abscess and multiple colonic resections secondary to complications of gynecological procedure over 20 years ago resulting in chronic narcotic dependence, short-bowel syndrome, chronic postprocedural pain here for pain med refill.     1. Other chronic postprocedural pain  2. Chronic narcotic dependence (HCC)  3. Short bowel syndrome  Has been on current regimen of Dilaudid (2 mg 4 times daily) since December.  She reports pain is better controlled controlled on current regimen and has been receiving 15 additional pills each month for breakthrough pain.  Reports her UDS historically has been negative for BZ that is prescribed by her Psychiatrist.     4. Skin wound   Reports skin wound to her lower abdomen existing laprotomy scar for last few weeks. States she has been applying betadine over it. Denies any pain, fever, chills, abdominal pain localized to the wound.        Patient Active Problem List    Diagnosis Date Noted   • Elevated BP without diagnosis of hypertension 03/02/2022   • Other chronic postprocedural pain 12/21/2021   • Preventative health care 12/21/2021   • Need for vaccination 11/30/2021   • Immunization counseling 11/30/2021   • Abnormal mammogram 01/30/2020   • Encounter for other screening for malignant neoplasm of breast 01/22/2020   • Pap smear abnormality of cervix/human papillomavirus (HPV) positive 02/11/2019   • Other abnormal findings in specimens from female genital organs 02/11/2019   • HPV (human papilloma virus) infection 09/26/2018   • Bacterial vaginosis 09/26/2018   • Adenoma of left adrenal gland 07/17/2018   • Simple ovarian cyst 07/17/2018   • Neurogenic bladder-straight cath   "03/26/2014   • PTSD (post-traumatic stress disorder) 03/26/2014   • Chronic narcotic dependence (HCC) 03/26/2014   • Short bowel syndrome 03/18/2013   • Right lower quadrant pain 03/18/2013       Current Outpatient Medications   Medication Sig Dispense Refill   • [START ON 5/1/2022] HYDROmorphone (DILAUDID) 2 MG Tab Take 1 Tablet by mouth 4 times a day for 30 days. 4 times daily for 30 days plus 15 additional tablets for breakthrough pain. 135 Tablet 0   • estradiol (CLIMARA) 0.075 MG/24HR PATCH WEEKLY Place 0.075 Patches on the skin every 7 days.     • progesterone (PROMETRIUM) 200 MG capsule Take 200 mg by mouth at bedtime.     • Zolpidem Tartrate 3.5 MG SL Tab Take 3.5 mg by mouth at bedtime as needed.     • mirtazapine (REMERON) 15 MG TABLET DISPERSIBLE Take 7.5 mg by mouth at bedtime as needed. 1/2 tablet = 7.5 mg.   Indications: PTSD     • sertraline (ZOLOFT) 20 MG/ML concentrated solution Take 100 mg by mouth at bedtime. 5 mL = 100 mg.     • NARCAN 4 MG/0.1ML Liquid Spray 1 Spray in nose as needed.     • clonazepam (KLONOPIN) 0.5 MG Tab Take 0.5 mg by mouth 3 times a day as needed (Anxiety).     • cyanocobalamin (VITAMIN B-12) 1000 MCG/ML SOLN Inject 1,000 mcg into the shoulder, thigh, or buttocks every 7 days.       No current facility-administered medications for this visit.       ROS:   Review of Systems   Constitutional: Negative for chills and fever.   HENT: Negative for ear discharge, ear pain and tinnitus.    Eyes: Negative for double vision, photophobia and pain.   Respiratory: Negative for hemoptysis.    Cardiovascular: Negative for chest pain and palpitations.   Gastrointestinal: Negative for nausea and vomiting.   Genitourinary: Negative.    Musculoskeletal: Negative.    Neurological: Negative.             /93 (BP Location: Left arm, Patient Position: Sitting, BP Cuff Size: Large adult long) Comment: repeat 5 min bp  Pulse 75   Temp 37 °C (98.6 °F) (Temporal)   Ht 1.651 m (5' 5\")   SpO2 " 95%   BMI 30.12 kg/m²     Physical Exam   Physical Exam  Constitutional:       General: She is not in acute distress.     Appearance: She is normal weight. She is not ill-appearing.   HENT:      Head: Normocephalic and atraumatic.      Nose: No congestion or rhinorrhea.   Eyes:      Extraocular Movements: Extraocular movements intact.      Conjunctiva/sclera: Conjunctivae normal.      Pupils: Pupils are equal, round, and reactive to light.   Cardiovascular:      Rate and Rhythm: Regular rhythm.      Pulses: Normal pulses.      Heart sounds: Normal heart sounds. No murmur heard.    No gallop.   Pulmonary:      Effort: Pulmonary effort is normal. No respiratory distress.      Breath sounds: Normal breath sounds. No wheezing or rales.   Abdominal:      General: Abdomen is flat. A surgical scar is present. There is no distension.      Tenderness: There is abdominal tenderness in the right lower quadrant.      Hernia: No hernia is present. There is no hernia in the umbilical area or ventral area.      Comments: Multiple surgical scars. Guarding in suprapubic region.   Musculoskeletal:         General: Normal range of motion.      Right lower leg: No edema.      Left lower leg: No edema.   Skin:     General: Skin is warm and dry.      Findings: Lesion (small wound with surrounding erythema lower abdominal lapratomy scar) present.   Neurological:      Mental Status: She is alert.      Cranial Nerves: No cranial nerve deficit.      Motor: No weakness.      Gait: Gait normal.   Psychiatric:         Mood and Affect: Mood normal.            Note: I have reviewed all pertinent labs and diagnostic tests associated with this visit with specific comments listed under the assessment and plan below    Assessment and Plan    1. Other chronic postprocedural pain  -Refilled for 30 days. PDMP reviewed.   -Had detailed discussion about risk of simultaneous narcotic and BZ use. PT verbalized understanding. Has narcan at home.      -  HYDROmorphone (DILAUDID) 2 MG Tab; Take 1 Tablet by mouth 4 times a day for 30 days. 4 times daily for 30 days plus 15 additional tablets for breakthrough pain.  Dispense: 135 Tablet; Refill: 0    2. Chronic narcotic dependence (HCC)  - HYDROmorphone (DILAUDID) 2 MG Tab; Take 1 Tablet by mouth 4 times a day for 30 days. 4 times daily for 30 days plus 15 additional tablets for breakthrough pain.  Dispense: 135 Tablet; Refill: 0    3. Wound of skin  -Recommended to take care of skin hygiene, apply warm compresses, topical neosporin.   -Follow up for worsening wound, drainage, signs of systemic infection like fever, chills.        Chronic pain recheck:   Last dose of controlled substance: 4/26  Chronic pain treated with Dilaudid taken 4 times a day  Current alcohol or substance use: no    Consequences of Chronic Opiate therapy:  (5 A's)  Analgesia:  not changed  Activity:  not changed  Adverse Events:  none  Aberrant Behaviors: no inappropriate refills requested, lost or stolen meds reported.   Affect/Mood: good grooming    Nonnarcotic treatments that are being used: PO Dilaudid 2 mg     Pain management agreement initiated/updated and signed on: 11/2021  Urine drug screen done: 1/2022 , which was reviewed today and doesnot have BZ on it.     I have reviewed the medical records, the Prescription Monitoring Program and I have determined that controlled substance treatment is medically indicated.         Followup: Return in about 3 months (around 7/26/2022).    Patient discussed with attending.    Signed by: Mike Chadwick M.D.    Please note that this dictation was created using voice recognition software. I have made every reasonable attempt to correct obvious errors, but I expect that there are errors of grammar and possibly content that I did not discover before finalizing the note.

## 2022-06-02 NOTE — PROGRESS NOTES
"    Established Patient    Patient Care Team:  Kym Bridges M.D. as PCP - General (Internal Medicine)    HPI:  Corazon Berg is a 46 y.o. female who presents today with the following Chief Complaint(s): Follow up for Diagnoses of Other chronic postprocedural pain, Chronic narcotic dependence (HCC), and Short bowel syndrome were pertinent to this visit.        Chronic pain recheck for:  Chronic postprocedural pain, short bowel syndrome  Last dose of controlled substance: yesterday at 11 pm.  Pain is currently at a 6/10  Chronic pain treated with 2mg dilaudid taken 3-4 times a day    She  reports no history of alcohol use.  She  reports no history of drug use.    Interval history:   Any major change in health since last appointment? Yes     [facility] Recommended stopping B12 injections.  Also recommended surgery referral for new opened wound on midline scar incision. Started having pain and pushing up of little bump 9 weeks ago. Previously noted in clinic in April. Now has opened wound with occasional drainage. No odor. No fevers. No swelling, mild erythema. Has deeper pain, \"underneath.\"  Has been cleaning with betadine and covering with gauze.     Previously started on bentyl- had significant discomfort/gas/and bloated.     Consequences of Chronic Opiate therapy:  (5 A's)  Analgesia: Compared to no treatment or prior treatment, pain is currently worsened   Activity: not changed  Adverse Events: She denies constipation, dry mouth, itchy skin, nausea and sedation  Aberrant Behaviors: She reports she is taking medication as prescribed and is not veering from agreed treatment regimen or provider recommendations. There have been no inappropriate refills or lost/stolen meds reported.  Affect/Mood: Pain is impacting patient's mood.  Patient reports depression/anxiety.    Nonnarcotic treatments that are being used: NSAIDs/NOWAK-2.     Last imaging: nothing " new    Opioid Risk Score: 8    Interpretation of Opioid Risk Score   Score 0-3 = Low risk of abuse. Do UDS at least once per year.  Score 4-7 = Moderate risk of abuse. Do UDS 1-4 times per year.  Score 8+ = High risk of abuse. Refer to specialist.    Last order of CONTROLLED SUBSTANCE TREATMENT AGREEMENT was found on 11/10/2021 from Office Visit on 11/10/2021     UDS Summary     This patient has no relevant Health Maintenance data.        Most recent UDS reviewed today and is consistent with prescribed medications.     I have reviewed the medical records, the Prescription Monitoring Program and I have determined that controlled substance treatment is medically indicated.       ROS:     Denies any new chest pain or shortness of breath.  No changes to urinary or bowel function.  See HPI.      Past Medical History:   Diagnosis Date   • Bowel perforation (HCC)    • Elevated liver enzymes 3/26/2014   • Heart murmur     grade 2, and grade 4   • Kidney infection    • Obstruction     reversed 3/2013   • Other specified disorder of intestines     chronic diarrhea   • Other specified symptom associated with female genital organs    • Pain 3/14/13    6/10 abdomen   • Pneumonia 1992   • Pneumothorax    • PTSD (post-traumatic stress disorder)    • SBO (small bowel obstruction) (HCC)    • Sepsis, unspecified 1997&1998   • Unspecified complication of colostomy or enterostomy 1/4/2013   • Unspecified urinary incontinence    • Urethra disorder     self caths   • Urinary bladder disorder     self caths, freq uti, and kidney infections   • Urinary incontinence due to severe physical disability 11/10/2021   • Urinary tract infection, site not specified 11/8/2019   • Vaginal bleeding 1/22/2020     Social History     Tobacco Use   • Smoking status: Never Smoker   • Smokeless tobacco: Never Used   Vaping Use   • Vaping Use: Never used   Substance Use Topics   • Alcohol use: No   • Drug use: No     Current Outpatient Medications    Medication Sig Dispense Refill   • HYDROmorphone (DILAUDID) 2 MG Tab Take 1 Tablet by mouth every four hours as needed for Severe Pain for up to 30 days. 135 Tablet 0   • estradiol (CLIMARA) 0.075 MG/24HR PATCH WEEKLY Place 0.075 Patches on the skin every 7 days.     • progesterone (PROMETRIUM) 200 MG capsule Take 200 mg by mouth at bedtime.     • Zolpidem Tartrate 3.5 MG SL Tab Take 3.5 mg by mouth at bedtime as needed.     • mirtazapine (REMERON) 15 MG TABLET DISPERSIBLE Take 7.5 mg by mouth at bedtime as needed. 1/2 tablet = 7.5 mg.   Indications: PTSD     • sertraline (ZOLOFT) 20 MG/ML concentrated solution Take 100 mg by mouth at bedtime. 5 mL = 100 mg.     • NARCAN 4 MG/0.1ML Liquid Spray 1 Spray in nose as needed.     • cyanocobalamin (VITAMIN B-12) 1000 MCG/ML SOLN Inject 1,000 mcg into the shoulder, thigh, or buttocks every 7 days.     • nitrofurantoin (MACRODANTIN) 50 MG Cap Take 50 mg by mouth every day. Take as needed for UTI prophylaxis       No current facility-administered medications for this visit.       Physical Exam:  /86 (BP Location: Left arm, Patient Position: Sitting, BP Cuff Size: Adult long) Comment: repeated bp 5 mins  Pulse 95   Temp 36.2 °C (97.2 °F) (Temporal)   SpO2 96%   General: Well developed, well nourished female, in no distress.  Eyes: Conjuntiva without any obvious injection or erythema.   Cardiovascular: Heart is regular without murmur  Lungs: Clear to auscultation bilaterally. No wheezes, rhonci or crackles heard. Respiratory effort is normal.  Abd: Soft, non-tender. Wound on previous midline incision. Nonpurulent, non-erythematous, non-swelling.   Ext: No edema  Neuro: CN II-XII. No focal deficits  Psych: mood and affect appropriate    Assessment and Plan:   Problem List Items Addressed This Visit     Chronic narcotic dependence (HCC) (Chronic)     Pain not optimally controlled on dilaudid 4 times daily.   PDMP reviewed with no red flags.  CS agreement signed  11/10/2021  Discussed with patient will not be increasing her dose at this time.   Discussed that unfortunately the more she uses the Dilaudid her tolerance level will go up and will ultimately make her pain worse.   Discussed that dilaudid is not an optimal pain regimen for her chronic pain and the importance of her needing to re-establish with a pain med doc.    -Sent diluadid script for 1 month- 2 mg q 4hr with 15 extra tablets  Follow up in 1 month.  Reach out [facility] on patients behalf           Relevant Medications    HYDROmorphone (DILAUDID) 2 MG Tab    Other chronic postprocedural pain (Chronic)     Refilled diludid as above.  Agree with GI recommendation to see surgery.  Encouraged patient to keep reaching out to Dr. Henderson' office for wound on incision.     -reach out to surgery on patient's behalf  -advised to watch out for s/s of infection of her wound and to present herself to the ED if they develop  -continue to follow closely           Relevant Medications    HYDROmorphone (DILAUDID) 2 MG Tab    Short bowel syndrome (Chronic)     Doing well except for chronic pain not fully controlled on Dilaudid 6 times daily.  Followed closely by GI- [facility].    -[facility] from [facility]              Relevant Medications    HYDROmorphone (DILAUDID) 2 MG Tab          Return in about 1 month (around 7/3/2022).      Kym Bridges M.D. PGY1  [facility]

## 2022-06-03 ENCOUNTER — OFFICE VISIT (OUTPATIENT)
Dept: INTERNAL MEDICINE | Facility: OTHER | Age: 47
End: 2022-06-03
Payer: MEDICAID

## 2022-06-03 ENCOUNTER — TELEPHONE (OUTPATIENT)
Dept: INTERNAL MEDICINE | Facility: OTHER | Age: 47
End: 2022-06-03

## 2022-06-03 VITALS
SYSTOLIC BLOOD PRESSURE: 130 MMHG | HEART RATE: 95 BPM | TEMPERATURE: 97.2 F | OXYGEN SATURATION: 96 % | DIASTOLIC BLOOD PRESSURE: 86 MMHG

## 2022-06-03 DIAGNOSIS — G89.28 OTHER CHRONIC POSTPROCEDURAL PAIN: Chronic | ICD-10-CM

## 2022-06-03 DIAGNOSIS — F11.20 CHRONIC NARCOTIC DEPENDENCE (HCC): Chronic | ICD-10-CM

## 2022-06-03 DIAGNOSIS — K90.829 SHORT BOWEL SYNDROME: Chronic | ICD-10-CM

## 2022-06-03 PROCEDURE — 99213 OFFICE O/P EST LOW 20 MIN: CPT | Mod: GE

## 2022-06-03 RX ORDER — NITROFURANTOIN MACROCRYSTALS 50 MG/1
50 CAPSULE ORAL DAILY
COMMUNITY
Start: 2022-04-30 | End: 2022-08-07

## 2022-06-03 RX ORDER — HYDROMORPHONE HYDROCHLORIDE 2 MG/1
2 TABLET ORAL EVERY 4 HOURS PRN
Qty: 135 TABLET | Refills: 0 | Status: SHIPPED | OUTPATIENT
Start: 2022-06-03 | End: 2022-06-29 | Stop reason: SDUPTHER

## 2022-06-03 RX ORDER — CLONAZEPAM 0.5 MG/1
0.5 TABLET, ORALLY DISINTEGRATING ORAL 3 TIMES DAILY PRN
COMMUNITY
Start: 2022-05-28 | End: 2022-06-03

## 2022-06-03 RX ORDER — HYDROMORPHONE HYDROCHLORIDE 2 MG/1
2-4 TABLET ORAL
COMMUNITY
End: 2022-06-03 | Stop reason: SDUPTHER

## 2022-06-03 ASSESSMENT — PATIENT HEALTH QUESTIONNAIRE - PHQ9: CLINICAL INTERPRETATION OF PHQ2 SCORE: 0

## 2022-06-03 NOTE — LETTER
Critical access hospital  Kym Bridges M.D.  [address] [facility] [address]  Fax: [phone]   Authorization for Release/Disclosure of   Protected Health Information   Name: SHERI BERG : 1975 SSN: [id number]   Address: [address] [place] [id number] Phone:    [phone] (home)    I authorize the entity listed below to release/disclose the PHI below to:   Critical access hospital/Kym Bridges M.D. and Kym Bridges M.D.   Provider or Entity Name:     Address   City, State, Zip   Phone:      Fax:     Reason for request: continuity of care   Information to be released:    [  ] LAST COLONOSCOPY,  including any PATH REPORT and follow-up  [  ] LAST FIT/COLOGUARD RESULT [  ] LAST DEXA  [  ] LAST MAMMOGRAM  [  ] LAST PAP  [  ] LAST LABS [  ] RETINA EXAM REPORT  [  ] IMMUNIZATION RECORDS  [  ] Release all info      [  ] Check here and initial the line next to each item to release ALL health information INCLUDING  _____ Care and treatment for drug and / or alcohol abuse  _____ HIV testing, infection status, or AIDS  _____ Genetic Testing    DATES OF SERVICE OR TIME PERIOD TO BE DISCLOSED: _____________  I understand and acknowledge that:  * This Authorization may be revoked at any time by you in writing, except if your health information has already been used or disclosed.  * Your health information that will be used or disclosed as a result of you signing this authorization could be re-disclosed by the recipient. If this occurs, your re-disclosed health information may no longer be protected by State or Federal laws.  * You may refuse to sign this Authorization. Your refusal will not affect your ability to obtain treatment.  * This Authorization becomes effective upon signing and will  on (date) __________.      If no date is indicated, this Authorization will  one (1) year from the signature date.    Name: Sheri Berg    Signature:   Date:     6/3/2022       PLEASE FAX  REQUESTED RECORDS BACK TO: [phone]

## 2022-06-03 NOTE — PATIENT INSTRUCTIONS
Great to see you today Corazon!    Have sent in a refill for your Dilaudid.  Recommend that we continue to try to get you established with pain management for a more optimal chronic pain medication regimen.  It appears her most recent pain referral went through [facility] their information is below.    [facility]   [address]  [address]  [place] [address]  Phone: [phone]     Recommend that you do follow-up with surgery.  Our office will reach out to Dr. Pedro Weiner's office to see if we get you in with him, however if that is not an option recommend that you do still see a surgeon for the new opening of your incision.    If this does continue develop significant drainage, swelling, malodorous, or you develop fever or other concerning symptoms please present to the emergency department.    Lets plan to follow-up in 1 month.  Sooner if needed.

## 2022-06-06 NOTE — ASSESSMENT & PLAN NOTE
Doing well except for chronic pain not fully controlled on Dilaudid 6 times daily.  Followed closely by GI- just re[facility] with [facility].    -request records from [facility]

## 2022-06-06 NOTE — ASSESSMENT & PLAN NOTE
Pain not optimally controlled on dilaudid 4 times daily.   PDMP reviewed with no red flags.  CS agreement signed 11/10/2021  Discussed with patient will not be increasing her dose at this time.   Discussed that unfortunately the more she uses the Dilaudid her tolerance level will go up and will ultimately make her pain worse.   Discussed that dilaudid is not an optimal pain regimen for her chronic pain and the importance of her needing to re-establish with a pain med doc.    -Sent diluadid script for 1 month- 2 mg q 4hr with 15 extra tablets  Follow up in 1 month.  Reach out [facility] on patients behalf

## 2022-06-06 NOTE — TELEPHONE ENCOUNTER
I spoke to Mi at [facility] in [place] at  [phone]. I was told patient in a 3-6 month waiting list for Medicaid [employer] insurance. They are taking her insurance just in a waiting list. I also reach out to [facility] at  [phone] and  patient has an appointment on  08/31/2022 at 4:30pm. I spoke to Miky at Dr Gutierrez at number [phone], she stated they only see cancer patient. Corazon refer back to [facility].

## 2022-06-06 NOTE — ASSESSMENT & PLAN NOTE
Refilled diludid as above.  Agree with GI recommendation to see surgery.  Encouraged patient to keep reaching out to Dr. Henderson' office for wound on incision.     -reach out to surgery on patient's behalf  -advised to watch out for s/s of infection of her wound and to present herself to the ED if they develop  -continue to follow closely

## 2022-06-28 ENCOUNTER — APPOINTMENT (OUTPATIENT)
Dept: INTERNAL MEDICINE | Facility: OTHER | Age: 47
End: 2022-06-28
Payer: MEDICAID

## 2022-06-29 ENCOUNTER — OFFICE VISIT (OUTPATIENT)
Dept: INTERNAL MEDICINE | Facility: OTHER | Age: 47
End: 2022-06-29
Payer: MEDICAID

## 2022-06-29 VITALS
SYSTOLIC BLOOD PRESSURE: 115 MMHG | HEART RATE: 89 BPM | OXYGEN SATURATION: 97 % | BODY MASS INDEX: 30.12 KG/M2 | TEMPERATURE: 97.8 F | DIASTOLIC BLOOD PRESSURE: 87 MMHG | HEIGHT: 65 IN

## 2022-06-29 DIAGNOSIS — G89.28 OTHER CHRONIC POSTPROCEDURAL PAIN: Primary | Chronic | ICD-10-CM

## 2022-06-29 DIAGNOSIS — K90.829 SHORT BOWEL SYNDROME: Chronic | ICD-10-CM

## 2022-06-29 DIAGNOSIS — Z79.899 HIGH RISK MEDICATION USE: ICD-10-CM

## 2022-06-29 DIAGNOSIS — K52.9 CHRONIC DIARRHEA: ICD-10-CM

## 2022-06-29 DIAGNOSIS — T14.8XXA WOUND OF SKIN: ICD-10-CM

## 2022-06-29 DIAGNOSIS — F11.20 CHRONIC NARCOTIC DEPENDENCE (HCC): Chronic | ICD-10-CM

## 2022-06-29 DIAGNOSIS — F43.10 PTSD (POST-TRAUMATIC STRESS DISORDER): ICD-10-CM

## 2022-06-29 PROCEDURE — 99214 OFFICE O/P EST MOD 30 MIN: CPT | Mod: GC

## 2022-06-29 PROCEDURE — 99999 PR NO CHARGE: CPT | Performed by: INTERNAL MEDICINE

## 2022-06-29 RX ORDER — CLONAZEPAM 0.5 MG/1
0.5 TABLET ORAL 3 TIMES DAILY PRN
COMMUNITY

## 2022-06-29 RX ORDER — HYDROMORPHONE HYDROCHLORIDE 2 MG/1
2 TABLET ORAL EVERY 6 HOURS PRN
Qty: 135 TABLET | Refills: 0 | Status: SHIPPED | OUTPATIENT
Start: 2022-07-04 | End: 2022-07-11 | Stop reason: SDUPTHER

## 2022-06-29 RX ORDER — HYDROMORPHONE HYDROCHLORIDE 2 MG/1
2 TABLET ORAL EVERY 6 HOURS PRN
Qty: 135 TABLET | Refills: 0 | Status: SHIPPED | OUTPATIENT
Start: 2022-08-04 | End: 2022-09-03

## 2022-06-29 RX ORDER — HYDROMORPHONE HYDROCHLORIDE 2 MG/1
2 TABLET ORAL EVERY 6 HOURS PRN
Qty: 135 TABLET | Refills: 0 | Status: SHIPPED | OUTPATIENT
Start: 2022-09-04 | End: 2022-10-04

## 2022-06-29 NOTE — PATIENT INSTRUCTIONS
Good to see you today Corazon!    Have sent in refills for your hydromorphone to cover you until September.  We will plan to follow-up middle of September for pain refill.  Hopefully at that time you will have been able to get established with pain management.    As discussed you can try calling [facility] to see where you are on their wait list.  Advise making sure they are aware that your most recent prescribers for Dilaudid have all been residence under the same clinic under the supervision of Dr. Juan Mortensen.  If they have any questions they can call our clinic or reach out to us.    Recommend that you schedule an appointment with your gynecologist for a Pap smear.    Have sent in a new referral to a GI doc requesting a different clinic.    Please schedule appointment with surgery.     Have sent in new lab work.  Please make sure they know to send the results to our clinic.    We will plan to follow-up in September, sooner if needed.
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
## 2022-07-07 ENCOUNTER — TELEPHONE (OUTPATIENT)
Dept: INTERNAL MEDICINE | Facility: OTHER | Age: 47
End: 2022-07-07
Payer: MEDICAID

## 2022-07-07 DIAGNOSIS — K90.829 SHORT BOWEL SYNDROME: Chronic | ICD-10-CM

## 2022-07-07 DIAGNOSIS — G89.28 OTHER CHRONIC POSTPROCEDURAL PAIN: Chronic | ICD-10-CM

## 2022-07-07 DIAGNOSIS — F11.20 CHRONIC NARCOTIC DEPENDENCE (HCC): Chronic | ICD-10-CM

## 2022-07-07 NOTE — TELEPHONE ENCOUNTER
Pt called to inform us of her insurance issue with Hydromorphone. Informed her a PA has been submitted and we are waiting on her insurance. Also informed her a message was sent to Dr. Bridges informing her that a new script would have to be sent for the remaining 20 days since she paid for 10 days. Per pt she only got 40 tabs which is enough for 8 days.

## 2022-07-07 NOTE — TELEPHONE ENCOUNTER
Received message via Cover my Meds stating Hydromorphone 2mg is covered without PA. Called pharmacy to inform of this they stated they would need a new rx for the remaining 20 day supply so they can run through insurance again and figure out what the issue is.     pcp please advise

## 2022-07-07 NOTE — TELEPHONE ENCOUNTER
DOCUMENTATION OF PAR STATUS:    1. Name of Medication & Dose: Hydromorphone 2mg    2. Name of Prescription Coverage Company & phone #: Medicaid FFS    3. Date Prior Auth Submitted: 07/07/2022    4. What information was given to obtain insurance decision?     5. Prior Auth Letter Approved or Denied?Penidng     6. Action Taken: Pharmacy/Patient Notified: Yes

## 2022-07-07 NOTE — TELEPHONE ENCOUNTER
1. Caller Name: [facility] Pharmacist                        Call Back Number: [phone]        Received a call from [facility] pharmacy stating pts insurance has changed their guidelines and Hydromorphone now needs a PA. Rx with start day 07/04/2022 was used but pt was only given a 10 day supply (paid for out of pocket) so they will not be able to dispense the rest of the meds off that script and a new rx will have to be sent in for the remaining 20days.     Will submit PA Dr. Bridges please advise on new rx for remaining 20 days. Pt filled on 07/04/2022

## 2022-07-11 RX ORDER — HYDROMORPHONE HYDROCHLORIDE 2 MG/1
2 TABLET ORAL EVERY 6 HOURS PRN
Qty: 95 TABLET | Refills: 0 | Status: SHIPPED | OUTPATIENT
Start: 2022-07-13 | End: 2022-08-03

## 2022-07-13 NOTE — TELEPHONE ENCOUNTER
Spoke with patient and informed her that a new prescription was sent to her pharmacy to cover the rest of the month. Patient stated she has talked to the pharmacist and her insurance company and that she was told she should be able to  her medication today without any problems. Patient states she will call back if she has any problem picking up her prescription.

## 2022-08-04 NOTE — TELEPHONE ENCOUNTER
Last seen: 06.29.2022 by Dr. rousseau  Next appt: 09.14.2022 with Dr. Rousseau    Was the patient seen in the last year in this department? Yes   Does patient have an active prescription for medications requested? No   Received Request Via: Pharmacy

## 2022-08-07 RX ORDER — NITROFURANTOIN MACROCRYSTALS 50 MG/1
CAPSULE ORAL
Qty: 30 CAPSULE | Refills: 0 | Status: SHIPPED | OUTPATIENT
Start: 2022-08-07 | End: 2022-12-09 | Stop reason: SDUPTHER

## 2022-09-01 ENCOUNTER — TELEPHONE (OUTPATIENT)
Dept: INTERNAL MEDICINE | Facility: OTHER | Age: 47
End: 2022-09-01
Payer: MEDICAID

## 2022-09-01 DIAGNOSIS — F11.20 CHRONIC NARCOTIC DEPENDENCE (HCC): ICD-10-CM

## 2022-09-01 NOTE — TELEPHONE ENCOUNTER
Patient called stating she went to her pharmacy to  her Dilaudid prescription because she will be out of medication tomorrow and was told the prescription that was sent had a release date of 09/04/2022 so she would not be able to pick it up until then. She stated that the pharmacist agreed that the release date was incorrect based on the quantity that was dispensed. Please advise.

## 2022-09-09 ENCOUNTER — TELEPHONE (OUTPATIENT)
Dept: INTERNAL MEDICINE | Facility: OTHER | Age: 47
End: 2022-09-09
Payer: MEDICAID

## 2022-09-09 NOTE — TELEPHONE ENCOUNTER
Patient called to get in touch with an MA she was left a message on Tuesday and had some questions. If someone could please give her a call she is very anxious.

## 2022-09-14 ENCOUNTER — APPOINTMENT (OUTPATIENT)
Dept: INTERNAL MEDICINE | Facility: OTHER | Age: 47
End: 2022-09-14
Payer: MEDICAID

## 2022-10-27 RX ORDER — NITROFURANTOIN MACROCRYSTALS 50 MG/1
CAPSULE ORAL
Qty: 30 CAPSULE | Refills: 0 | OUTPATIENT
Start: 2022-10-27

## 2022-11-01 RX ORDER — NITROFURANTOIN MACROCRYSTALS 50 MG/1
CAPSULE ORAL
Qty: 30 CAPSULE | Refills: 0 | OUTPATIENT
Start: 2022-11-01

## 2022-11-01 NOTE — TELEPHONE ENCOUNTER
Nitrofurantoin Refill     Last seen: 6/29/22 by Dr. Bridges  Next appt: None    Was the patient seen in the last year in this department? Yes   Does patient have an active prescription for medications requested? No   Received Request Via: Pharmacy

## 2022-11-11 RX ORDER — NITROFURANTOIN MACROCRYSTALS 50 MG/1
CAPSULE ORAL
Qty: 30 CAPSULE | Refills: 0 | OUTPATIENT
Start: 2022-11-11

## 2022-12-05 RX ORDER — NITROFURANTOIN MACROCRYSTALS 50 MG/1
CAPSULE ORAL
Qty: 30 CAPSULE | Refills: 0 | OUTPATIENT
Start: 2022-12-05

## 2022-12-06 NOTE — TELEPHONE ENCOUNTER
VOICEMAIL  1. Caller Name: Corazon                      Call Back Number: [phone]    2. Message: LVM and advised patient to call and schedule an appointment should she want her refills.     3. Patient approves office to leave a detailed voicemail/MyChart message: N\A

## 2022-12-09 DIAGNOSIS — N31.9 NEUROGENIC BLADDER: ICD-10-CM

## 2022-12-09 RX ORDER — NITROFURANTOIN MACROCRYSTALS 50 MG/1
50 CAPSULE ORAL
Qty: 30 CAPSULE | Refills: 0 | Status: SHIPPED | OUTPATIENT
Start: 2022-12-09

## 2022-12-09 NOTE — PROGRESS NOTES
Kym Bridges M.D.  You; Unr Mono Davis Ma 30 minutes ago (2:04 PM)     AS  Have filled medication for 1 month. Patient will need to schedule a follow up visit for future refills

## 2024-07-26 ENCOUNTER — HOSPITAL ENCOUNTER (EMERGENCY)
Facility: MEDICAL CENTER | Age: 49
End: 2024-07-26
Attending: EMERGENCY MEDICINE
Payer: MEDICAID

## 2024-07-26 ENCOUNTER — APPOINTMENT (OUTPATIENT)
Dept: RADIOLOGY | Facility: MEDICAL CENTER | Age: 49
End: 2024-07-26
Attending: EMERGENCY MEDICINE
Payer: MEDICAID

## 2024-07-26 VITALS
TEMPERATURE: 99.8 F | OXYGEN SATURATION: 96 % | BODY MASS INDEX: 30.16 KG/M2 | DIASTOLIC BLOOD PRESSURE: 113 MMHG | RESPIRATION RATE: 20 BRPM | SYSTOLIC BLOOD PRESSURE: 180 MMHG | WEIGHT: 181 LBS | HEIGHT: 65 IN | HEART RATE: 107 BPM

## 2024-07-26 DIAGNOSIS — R25.1 TREMORS OF NERVOUS SYSTEM: ICD-10-CM

## 2024-07-26 DIAGNOSIS — S09.90XA CLOSED HEAD INJURY, INITIAL ENCOUNTER: ICD-10-CM

## 2024-07-26 LAB
AMORPH CRY #/AREA URNS HPF: PRESENT /HPF
AMPHET UR QL SCN: NEGATIVE
APPEARANCE UR: CLEAR
BACTERIA #/AREA URNS HPF: NEGATIVE /HPF
BARBITURATES UR QL SCN: NEGATIVE
BASOPHILS # BLD AUTO: 1 % (ref 0–1.8)
BASOPHILS # BLD: 0.08 K/UL (ref 0–0.12)
BENZODIAZ UR QL SCN: POSITIVE
BILIRUB UR QL STRIP.AUTO: NEGATIVE
BZE UR QL SCN: NEGATIVE
CANNABINOIDS UR QL SCN: NEGATIVE
COLOR UR: YELLOW
EKG IMPRESSION: NORMAL
EOSINOPHIL # BLD AUTO: 0.01 K/UL (ref 0–0.51)
EOSINOPHIL NFR BLD: 0.1 % (ref 0–6.9)
EPI CELLS #/AREA URNS HPF: ABNORMAL /HPF
ERYTHROCYTE [DISTWIDTH] IN BLOOD BY AUTOMATED COUNT: 44.6 FL (ref 35.9–50)
FENTANYL UR QL: NEGATIVE
GLUCOSE UR STRIP.AUTO-MCNC: NEGATIVE MG/DL
HCT VFR BLD AUTO: 47.8 % (ref 37–47)
HGB BLD-MCNC: 16.1 G/DL (ref 12–16)
HYALINE CASTS #/AREA URNS LPF: ABNORMAL /LPF
IMM GRANULOCYTES # BLD AUTO: 0.01 K/UL (ref 0–0.11)
IMM GRANULOCYTES NFR BLD AUTO: 0.1 % (ref 0–0.9)
KETONES UR STRIP.AUTO-MCNC: 15 MG/DL
LEUKOCYTE ESTERASE UR QL STRIP.AUTO: NEGATIVE
LYMPHOCYTES # BLD AUTO: 1.79 K/UL (ref 1–4.8)
LYMPHOCYTES NFR BLD: 23.3 % (ref 22–41)
MCH RBC QN AUTO: 29.8 PG (ref 27–33)
MCHC RBC AUTO-ENTMCNC: 33.7 G/DL (ref 32.2–35.5)
MCV RBC AUTO: 88.4 FL (ref 81.4–97.8)
METHADONE UR QL SCN: NEGATIVE
MICRO URNS: ABNORMAL
MONOCYTES # BLD AUTO: 0.68 K/UL (ref 0–0.85)
MONOCYTES NFR BLD AUTO: 8.9 % (ref 0–13.4)
NEUTROPHILS # BLD AUTO: 5.1 K/UL (ref 1.82–7.42)
NEUTROPHILS NFR BLD: 66.6 % (ref 44–72)
NITRITE UR QL STRIP.AUTO: NEGATIVE
NRBC # BLD AUTO: 0 K/UL
NRBC BLD-RTO: 0 /100 WBC (ref 0–0.2)
OPIATES UR QL SCN: NEGATIVE
OXYCODONE UR QL SCN: NEGATIVE
PCP UR QL SCN: NEGATIVE
PH UR STRIP.AUTO: 6 [PH] (ref 5–8)
PLATELET # BLD AUTO: 380 K/UL (ref 164–446)
PMV BLD AUTO: 10.5 FL (ref 9–12.9)
PROPOXYPH UR QL SCN: NEGATIVE
PROT UR QL STRIP: NEGATIVE MG/DL
RBC # BLD AUTO: 5.41 M/UL (ref 4.2–5.4)
RBC # URNS HPF: ABNORMAL /HPF
RBC UR QL AUTO: ABNORMAL
SP GR UR STRIP.AUTO: 1.02
UROBILINOGEN UR STRIP.AUTO-MCNC: 0.2 MG/DL
WBC # BLD AUTO: 7.7 K/UL (ref 4.8–10.8)
WBC #/AREA URNS HPF: ABNORMAL /HPF

## 2024-07-26 PROCEDURE — 80307 DRUG TEST PRSMV CHEM ANLYZR: CPT

## 2024-07-26 PROCEDURE — 71045 X-RAY EXAM CHEST 1 VIEW: CPT

## 2024-07-26 PROCEDURE — 700111 HCHG RX REV CODE 636 W/ 250 OVERRIDE (IP): Mod: UD | Performed by: EMERGENCY MEDICINE

## 2024-07-26 PROCEDURE — 85025 COMPLETE CBC W/AUTO DIFF WBC: CPT

## 2024-07-26 PROCEDURE — 36415 COLL VENOUS BLD VENIPUNCTURE: CPT

## 2024-07-26 PROCEDURE — 81001 URINALYSIS AUTO W/SCOPE: CPT

## 2024-07-26 PROCEDURE — 96374 THER/PROPH/DIAG INJ IV PUSH: CPT

## 2024-07-26 PROCEDURE — 93005 ELECTROCARDIOGRAM TRACING: CPT | Performed by: EMERGENCY MEDICINE

## 2024-07-26 PROCEDURE — 99285 EMERGENCY DEPT VISIT HI MDM: CPT

## 2024-07-26 RX ORDER — SODIUM CHLORIDE, SODIUM LACTATE, POTASSIUM CHLORIDE, CALCIUM CHLORIDE 600; 310; 30; 20 MG/100ML; MG/100ML; MG/100ML; MG/100ML
1000 INJECTION, SOLUTION INTRAVENOUS ONCE
Status: DISCONTINUED | OUTPATIENT
Start: 2024-07-26 | End: 2024-07-26 | Stop reason: HOSPADM

## 2024-07-26 RX ORDER — LORAZEPAM 2 MG/ML
0.5 INJECTION INTRAMUSCULAR ONCE
Status: COMPLETED | OUTPATIENT
Start: 2024-07-26 | End: 2024-07-26

## 2024-07-26 RX ADMIN — LORAZEPAM 0.5 MG: 2 INJECTION INTRAMUSCULAR; INTRAVENOUS at 16:41
